# Patient Record
Sex: FEMALE | Race: WHITE | NOT HISPANIC OR LATINO
[De-identification: names, ages, dates, MRNs, and addresses within clinical notes are randomized per-mention and may not be internally consistent; named-entity substitution may affect disease eponyms.]

---

## 2017-03-06 ENCOUNTER — RESULT REVIEW (OUTPATIENT)
Age: 62
End: 2017-03-06

## 2017-05-06 PROBLEM — M25.569 KNEE PAIN: Status: ACTIVE | Noted: 2017-05-06

## 2017-05-10 ENCOUNTER — APPOINTMENT (OUTPATIENT)
Dept: ORTHOPEDIC SURGERY | Facility: CLINIC | Age: 62
End: 2017-05-10

## 2017-05-10 DIAGNOSIS — M25.569 PAIN IN UNSPECIFIED KNEE: ICD-10-CM

## 2018-01-23 ENCOUNTER — OUTPATIENT (OUTPATIENT)
Dept: OUTPATIENT SERVICES | Facility: HOSPITAL | Age: 63
LOS: 1 days | End: 2018-01-23
Payer: COMMERCIAL

## 2018-01-23 VITALS
DIASTOLIC BLOOD PRESSURE: 80 MMHG | HEIGHT: 66 IN | RESPIRATION RATE: 16 BRPM | WEIGHT: 209.66 LBS | TEMPERATURE: 98 F | HEART RATE: 84 BPM | SYSTOLIC BLOOD PRESSURE: 140 MMHG

## 2018-01-23 DIAGNOSIS — Z98.890 OTHER SPECIFIED POSTPROCEDURAL STATES: Chronic | ICD-10-CM

## 2018-01-23 DIAGNOSIS — N84.0 POLYP OF CORPUS UTERI: ICD-10-CM

## 2018-01-23 DIAGNOSIS — Z01.818 ENCOUNTER FOR OTHER PREPROCEDURAL EXAMINATION: ICD-10-CM

## 2018-01-23 DIAGNOSIS — Z90.49 ACQUIRED ABSENCE OF OTHER SPECIFIED PARTS OF DIGESTIVE TRACT: Chronic | ICD-10-CM

## 2018-01-23 LAB
ANION GAP SERPL CALC-SCNC: 13 MMOL/L — SIGNIFICANT CHANGE UP (ref 5–17)
BUN SERPL-MCNC: 13 MG/DL — SIGNIFICANT CHANGE UP (ref 8–20)
CALCIUM SERPL-MCNC: 9.3 MG/DL — SIGNIFICANT CHANGE UP (ref 8.6–10.2)
CHLORIDE SERPL-SCNC: 104 MMOL/L — SIGNIFICANT CHANGE UP (ref 98–107)
CO2 SERPL-SCNC: 27 MMOL/L — SIGNIFICANT CHANGE UP (ref 22–29)
CREAT SERPL-MCNC: 0.61 MG/DL — SIGNIFICANT CHANGE UP (ref 0.5–1.3)
GLUCOSE SERPL-MCNC: 102 MG/DL — SIGNIFICANT CHANGE UP (ref 70–115)
HCT VFR BLD CALC: 38 % — SIGNIFICANT CHANGE UP (ref 37–47)
HGB BLD-MCNC: 11.4 G/DL — LOW (ref 12–16)
MCHC RBC-ENTMCNC: 26.5 PG — LOW (ref 27–31)
MCHC RBC-ENTMCNC: 30 G/DL — LOW (ref 32–36)
MCV RBC AUTO: 88.2 FL — SIGNIFICANT CHANGE UP (ref 81–99)
PLATELET # BLD AUTO: 337 K/UL — SIGNIFICANT CHANGE UP (ref 150–400)
POTASSIUM SERPL-MCNC: 3.6 MMOL/L — SIGNIFICANT CHANGE UP (ref 3.5–5.3)
POTASSIUM SERPL-SCNC: 3.6 MMOL/L — SIGNIFICANT CHANGE UP (ref 3.5–5.3)
RBC # BLD: 4.31 M/UL — LOW (ref 4.4–5.2)
RBC # FLD: 15 % — SIGNIFICANT CHANGE UP (ref 11–15.6)
SODIUM SERPL-SCNC: 144 MMOL/L — SIGNIFICANT CHANGE UP (ref 135–145)
WBC # BLD: 10.1 K/UL — SIGNIFICANT CHANGE UP (ref 4.8–10.8)
WBC # FLD AUTO: 10.1 K/UL — SIGNIFICANT CHANGE UP (ref 4.8–10.8)

## 2018-01-23 PROCEDURE — 93005 ELECTROCARDIOGRAM TRACING: CPT

## 2018-01-23 PROCEDURE — 80048 BASIC METABOLIC PNL TOTAL CA: CPT

## 2018-01-23 PROCEDURE — G0463: CPT

## 2018-01-23 PROCEDURE — 85027 COMPLETE CBC AUTOMATED: CPT

## 2018-01-23 PROCEDURE — 93010 ELECTROCARDIOGRAM REPORT: CPT

## 2018-01-23 PROCEDURE — 36415 COLL VENOUS BLD VENIPUNCTURE: CPT

## 2018-01-23 RX ORDER — SODIUM CHLORIDE 9 MG/ML
3 INJECTION INTRAMUSCULAR; INTRAVENOUS; SUBCUTANEOUS ONCE
Qty: 0 | Refills: 0 | Status: DISCONTINUED | OUTPATIENT
Start: 2018-02-05 | End: 2018-02-20

## 2018-01-23 NOTE — H&P PST ADULT - NSANTHOSAYNRD_GEN_A_CORE
No. IVA screening performed.  STOP BANG Legend: 0-2 = LOW Risk; 3-4 = INTERMEDIATE Risk; 5-8 = HIGH Risk

## 2018-01-23 NOTE — H&P PST ADULT - PMH
Acute pain  left shoulder  Asthma    GERD (gastroesophageal reflux disease)    Hypertension    Overactive bladder Acute pain  left shoulder  Asthma    GERD (gastroesophageal reflux disease)    Hypertension    Overactive bladder    Uterine polyp

## 2018-01-23 NOTE — H&P PST ADULT - HISTORY OF PRESENT ILLNESS
63 year old female with a history of GERD, hypertension, asthma, and overactive bladder presents with a uterine polyp scheduled for a D&C on 2/5/18.

## 2018-02-05 ENCOUNTER — OUTPATIENT (OUTPATIENT)
Dept: OUTPATIENT SERVICES | Facility: HOSPITAL | Age: 63
LOS: 1 days | End: 2018-02-05
Payer: COMMERCIAL

## 2018-02-05 ENCOUNTER — RESULT REVIEW (OUTPATIENT)
Age: 63
End: 2018-02-05

## 2018-02-05 VITALS
OXYGEN SATURATION: 98 % | WEIGHT: 207.9 LBS | SYSTOLIC BLOOD PRESSURE: 136 MMHG | DIASTOLIC BLOOD PRESSURE: 74 MMHG | RESPIRATION RATE: 16 BRPM | HEART RATE: 89 BPM | TEMPERATURE: 99 F | HEIGHT: 66 IN

## 2018-02-05 VITALS
DIASTOLIC BLOOD PRESSURE: 80 MMHG | RESPIRATION RATE: 16 BRPM | HEART RATE: 78 BPM | OXYGEN SATURATION: 98 % | SYSTOLIC BLOOD PRESSURE: 143 MMHG

## 2018-02-05 DIAGNOSIS — Z98.890 OTHER SPECIFIED POSTPROCEDURAL STATES: Chronic | ICD-10-CM

## 2018-02-05 DIAGNOSIS — N84.0 POLYP OF CORPUS UTERI: ICD-10-CM

## 2018-02-05 DIAGNOSIS — Z90.49 ACQUIRED ABSENCE OF OTHER SPECIFIED PARTS OF DIGESTIVE TRACT: Chronic | ICD-10-CM

## 2018-02-05 PROCEDURE — 88305 TISSUE EXAM BY PATHOLOGIST: CPT | Mod: 26

## 2018-02-05 PROCEDURE — 58120 DILATION AND CURETTAGE: CPT

## 2018-02-05 PROCEDURE — 88305 TISSUE EXAM BY PATHOLOGIST: CPT

## 2018-02-05 RX ORDER — HYDROMORPHONE HYDROCHLORIDE 2 MG/ML
0.5 INJECTION INTRAMUSCULAR; INTRAVENOUS; SUBCUTANEOUS
Qty: 0 | Refills: 0 | Status: DISCONTINUED | OUTPATIENT
Start: 2018-02-05 | End: 2018-02-05

## 2018-02-05 RX ORDER — ONDANSETRON 8 MG/1
4 TABLET, FILM COATED ORAL ONCE
Qty: 0 | Refills: 0 | Status: DISCONTINUED | OUTPATIENT
Start: 2018-02-05 | End: 2018-02-05

## 2018-02-05 RX ORDER — SODIUM CHLORIDE 9 MG/ML
1000 INJECTION, SOLUTION INTRAVENOUS
Qty: 0 | Refills: 0 | Status: DISCONTINUED | OUTPATIENT
Start: 2018-02-05 | End: 2018-02-20

## 2018-02-05 RX ORDER — ONDANSETRON 8 MG/1
4 TABLET, FILM COATED ORAL EVERY 6 HOURS
Qty: 0 | Refills: 0 | Status: DISCONTINUED | OUTPATIENT
Start: 2018-02-05 | End: 2018-02-20

## 2018-02-05 NOTE — ASU DISCHARGE PLAN (ADULT/PEDIATRIC). - NOTIFY
Unable to Urinate/Pain not relieved by Medications/Fever greater than 101/Bleeding that does not stop/Persistent Nausea and Vomiting

## 2018-02-05 NOTE — ASU DISCHARGE PLAN (ADULT/PEDIATRIC). - MEDICATION SUMMARY - MEDICATIONS TO TAKE
I will START or STAY ON the medications listed below when I get home from the hospital:    Tylenol with Codeine #3 oral tablet  -- 1 tab(s) by mouth every 6 hours, As Needed  -- Indication: For as per MD    Cozaar 50 mg oral tablet  -- 1 tab(s) by mouth once a day  -- Indication: For as per MD    montelukast 10 mg oral tablet  -- 1 tab(s) by mouth once a day  -- Indication: For as per MD    Singulair 10 mg oral tablet  -- 1 tab(s) by mouth once a day  -- Indication: For as per MD    omeprazole 40 mg oral delayed release capsule  -- 1 cap(s) by mouth once a day  -- Indication: For as per tin    trospium 60 mg oral capsule, extended release  -- 1 cap(s) by mouth once a day (in the morning)  -- Indication: For as per MD

## 2018-02-05 NOTE — BRIEF OPERATIVE NOTE - POST-OP DX
Endocervical polyp  02/05/2018  3cm,  pale/white, extending from endocervix  Active  Payton Danielson

## 2018-02-05 NOTE — BRIEF OPERATIVE NOTE - PROCEDURE
<<-----Click on this checkbox to enter Procedure Dilation and curettage  02/05/2018    Active  [email or web address]  Endocervical curettage  02/05/2018    Active  [email or web address]  Endocervical polypectomy  02/05/2018    [id number]

## 2018-02-06 ENCOUNTER — TRANSCRIPTION ENCOUNTER (OUTPATIENT)
Age: 63
End: 2018-02-06

## 2018-02-13 ENCOUNTER — RESULT REVIEW (OUTPATIENT)
Age: 63
End: 2018-02-13

## 2018-02-20 LAB — SURGICAL PATHOLOGY FINAL REPORT - CH: SIGNIFICANT CHANGE UP

## 2018-02-28 ENCOUNTER — FORM ENCOUNTER (OUTPATIENT)
Age: 63
End: 2018-02-28

## 2018-03-05 ENCOUNTER — APPOINTMENT (OUTPATIENT)
Dept: ORTHOPEDIC SURGERY | Facility: CLINIC | Age: 63
End: 2018-03-05
Payer: COMMERCIAL

## 2018-03-05 VITALS
BODY MASS INDEX: 34.16 KG/M2 | HEIGHT: 65 IN | SYSTOLIC BLOOD PRESSURE: 139 MMHG | WEIGHT: 205 LBS | TEMPERATURE: 98.7 F | HEART RATE: 83 BPM | DIASTOLIC BLOOD PRESSURE: 80 MMHG

## 2018-03-05 DIAGNOSIS — M25.561 PAIN IN RIGHT KNEE: ICD-10-CM

## 2018-03-05 DIAGNOSIS — Z87.39 PERSONAL HISTORY OF OTHER DISEASES OF THE MUSCULOSKELETAL SYSTEM AND CONNECTIVE TISSUE: ICD-10-CM

## 2018-03-05 DIAGNOSIS — Z78.9 OTHER SPECIFIED HEALTH STATUS: ICD-10-CM

## 2018-03-05 DIAGNOSIS — Z82.61 FAMILY HISTORY OF ARTHRITIS: ICD-10-CM

## 2018-03-05 DIAGNOSIS — Z86.79 PERSONAL HISTORY OF OTHER DISEASES OF THE CIRCULATORY SYSTEM: ICD-10-CM

## 2018-03-05 DIAGNOSIS — Z80.0 FAMILY HISTORY OF MALIGNANT NEOPLASM OF DIGESTIVE ORGANS: ICD-10-CM

## 2018-03-05 DIAGNOSIS — Z87.09 PERSONAL HISTORY OF OTHER DISEASES OF THE RESPIRATORY SYSTEM: ICD-10-CM

## 2018-03-05 PROCEDURE — 73562 X-RAY EXAM OF KNEE 3: CPT | Mod: RT

## 2018-03-05 PROCEDURE — 99204 OFFICE O/P NEW MOD 45 MIN: CPT

## 2018-03-05 RX ORDER — TROSPIUM CHLORIDE 60 MG/1
60 CAPSULE, EXTENDED RELEASE ORAL
Refills: 0 | Status: ACTIVE | COMMUNITY

## 2018-03-05 RX ORDER — MONTELUKAST SODIUM 10 MG/1
10 TABLET, FILM COATED ORAL
Refills: 0 | Status: ACTIVE | COMMUNITY

## 2018-03-14 ENCOUNTER — APPOINTMENT (OUTPATIENT)
Dept: NUCLEAR MEDICINE | Facility: CLINIC | Age: 63
End: 2018-03-14
Payer: COMMERCIAL

## 2018-03-14 ENCOUNTER — OUTPATIENT (OUTPATIENT)
Dept: OUTPATIENT SERVICES | Facility: HOSPITAL | Age: 63
LOS: 1 days | End: 2018-03-14

## 2018-03-14 DIAGNOSIS — C54.1 MALIGNANT NEOPLASM OF ENDOMETRIUM: ICD-10-CM

## 2018-03-14 DIAGNOSIS — Z98.890 OTHER SPECIFIED POSTPROCEDURAL STATES: Chronic | ICD-10-CM

## 2018-03-14 DIAGNOSIS — Z90.49 ACQUIRED ABSENCE OF OTHER SPECIFIED PARTS OF DIGESTIVE TRACT: Chronic | ICD-10-CM

## 2018-03-14 PROCEDURE — 78815 PET IMAGE W/CT SKULL-THIGH: CPT | Mod: 26,PI

## 2018-03-15 ENCOUNTER — FORM ENCOUNTER (OUTPATIENT)
Age: 63
End: 2018-03-15

## 2018-03-16 ENCOUNTER — OUTPATIENT (OUTPATIENT)
Dept: OUTPATIENT SERVICES | Facility: HOSPITAL | Age: 63
LOS: 1 days | End: 2018-03-16
Payer: COMMERCIAL

## 2018-03-16 VITALS
HEART RATE: 94 BPM | TEMPERATURE: 98 F | DIASTOLIC BLOOD PRESSURE: 88 MMHG | WEIGHT: 209.44 LBS | RESPIRATION RATE: 16 BRPM | HEIGHT: 65.5 IN | SYSTOLIC BLOOD PRESSURE: 126 MMHG

## 2018-03-16 DIAGNOSIS — C54.1 MALIGNANT NEOPLASM OF ENDOMETRIUM: ICD-10-CM

## 2018-03-16 DIAGNOSIS — Z01.818 ENCOUNTER FOR OTHER PREPROCEDURAL EXAMINATION: ICD-10-CM

## 2018-03-16 DIAGNOSIS — Z90.49 ACQUIRED ABSENCE OF OTHER SPECIFIED PARTS OF DIGESTIVE TRACT: Chronic | ICD-10-CM

## 2018-03-16 DIAGNOSIS — Z98.890 OTHER SPECIFIED POSTPROCEDURAL STATES: Chronic | ICD-10-CM

## 2018-03-16 DIAGNOSIS — I10 ESSENTIAL (PRIMARY) HYPERTENSION: ICD-10-CM

## 2018-03-16 LAB
ANION GAP SERPL CALC-SCNC: 12 MMOL/L — SIGNIFICANT CHANGE UP (ref 5–17)
BASOPHILS # BLD AUTO: 0 K/UL — SIGNIFICANT CHANGE UP (ref 0–0.2)
BASOPHILS NFR BLD AUTO: 0.4 % — SIGNIFICANT CHANGE UP (ref 0–2)
BLD GP AB SCN SERPL QL: SIGNIFICANT CHANGE UP
BUN SERPL-MCNC: 14 MG/DL — SIGNIFICANT CHANGE UP (ref 8–20)
CALCIUM SERPL-MCNC: 9.3 MG/DL — SIGNIFICANT CHANGE UP (ref 8.6–10.2)
CHLORIDE SERPL-SCNC: 106 MMOL/L — SIGNIFICANT CHANGE UP (ref 98–107)
CO2 SERPL-SCNC: 26 MMOL/L — SIGNIFICANT CHANGE UP (ref 22–29)
CREAT SERPL-MCNC: 0.55 MG/DL — SIGNIFICANT CHANGE UP (ref 0.5–1.3)
EOSINOPHIL # BLD AUTO: 0.2 K/UL — SIGNIFICANT CHANGE UP (ref 0–0.5)
EOSINOPHIL NFR BLD AUTO: 2.2 % — SIGNIFICANT CHANGE UP (ref 0–6)
GLUCOSE SERPL-MCNC: 120 MG/DL — HIGH (ref 70–115)
HCT VFR BLD CALC: 37.7 % — SIGNIFICANT CHANGE UP (ref 37–47)
HGB BLD-MCNC: 11.4 G/DL — LOW (ref 12–16)
LYMPHOCYTES # BLD AUTO: 3.6 K/UL — SIGNIFICANT CHANGE UP (ref 1–4.8)
LYMPHOCYTES # BLD AUTO: 36.2 % — SIGNIFICANT CHANGE UP (ref 20–55)
MCHC RBC-ENTMCNC: 26.6 PG — LOW (ref 27–31)
MCHC RBC-ENTMCNC: 30.2 G/DL — LOW (ref 32–36)
MCV RBC AUTO: 87.9 FL — SIGNIFICANT CHANGE UP (ref 81–99)
MONOCYTES # BLD AUTO: 0.6 K/UL — SIGNIFICANT CHANGE UP (ref 0–0.8)
MONOCYTES NFR BLD AUTO: 5.8 % — SIGNIFICANT CHANGE UP (ref 3–10)
NEUTROPHILS # BLD AUTO: 5.6 K/UL — SIGNIFICANT CHANGE UP (ref 1.8–8)
NEUTROPHILS NFR BLD AUTO: 55.2 % — SIGNIFICANT CHANGE UP (ref 37–73)
PLATELET # BLD AUTO: 322 K/UL — SIGNIFICANT CHANGE UP (ref 150–400)
POTASSIUM SERPL-MCNC: 3.8 MMOL/L — SIGNIFICANT CHANGE UP (ref 3.5–5.3)
POTASSIUM SERPL-SCNC: 3.8 MMOL/L — SIGNIFICANT CHANGE UP (ref 3.5–5.3)
RBC # BLD: 4.29 M/UL — LOW (ref 4.4–5.2)
RBC # FLD: 15.4 % — SIGNIFICANT CHANGE UP (ref 11–15.6)
SODIUM SERPL-SCNC: 144 MMOL/L — SIGNIFICANT CHANGE UP (ref 135–145)
TYPE + AB SCN PNL BLD: SIGNIFICANT CHANGE UP
WBC # BLD: 10 K/UL — SIGNIFICANT CHANGE UP (ref 4.8–10.8)
WBC # FLD AUTO: 10 K/UL — SIGNIFICANT CHANGE UP (ref 4.8–10.8)

## 2018-03-16 PROCEDURE — 86850 RBC ANTIBODY SCREEN: CPT

## 2018-03-16 PROCEDURE — G0463: CPT

## 2018-03-16 PROCEDURE — 86901 BLOOD TYPING SEROLOGIC RH(D): CPT

## 2018-03-16 PROCEDURE — 36415 COLL VENOUS BLD VENIPUNCTURE: CPT

## 2018-03-16 PROCEDURE — 86900 BLOOD TYPING SEROLOGIC ABO: CPT

## 2018-03-16 PROCEDURE — 80048 BASIC METABOLIC PNL TOTAL CA: CPT

## 2018-03-16 PROCEDURE — 85027 COMPLETE CBC AUTOMATED: CPT

## 2018-03-16 RX ORDER — ACETAMINOPHEN WITH CODEINE 300MG-30MG
1 TABLET ORAL
Qty: 0 | Refills: 0 | COMMUNITY

## 2018-03-16 RX ORDER — LOSARTAN POTASSIUM 100 MG/1
1 TABLET, FILM COATED ORAL
Qty: 0 | Refills: 0 | COMMUNITY

## 2018-03-16 RX ORDER — MONTELUKAST 4 MG/1
1 TABLET, CHEWABLE ORAL
Qty: 0 | Refills: 0 | COMMUNITY

## 2018-03-16 NOTE — H&P PST ADULT - PROBLEM SELECTOR PLAN 1
Robotic assisted total laparoscopic hysterectomy, bilateral salpingo oophorectomy,  retroperitoneal lymph node sampling, intraoperative identification of sentinel node, cystoscopy.

## 2018-03-16 NOTE — H&P PST ADULT - PSH
S/P arthroscopy of shoulder    S/P cholecystectomy    S/P dilatation and curettage    S/P right knee arthroscopy    S/P tendon repair  R hand

## 2018-03-16 NOTE — H&P PST ADULT - HISTORY OF PRESENT ILLNESS
62 yo female with endometrial cancer, pt had thickened endometrium, Dilation and Curettage revealed endometrial cancer.

## 2018-03-16 NOTE — H&P PST ADULT - PMH
Acute pain  left shoulder  Asthma    GERD (gastroesophageal reflux disease)    Hypertension    Overactive bladder    Uterine polyp

## 2018-03-26 ENCOUNTER — FORM ENCOUNTER (OUTPATIENT)
Age: 63
End: 2018-03-26

## 2018-03-27 ENCOUNTER — RESULT REVIEW (OUTPATIENT)
Age: 63
End: 2018-03-27

## 2018-03-27 ENCOUNTER — INPATIENT (INPATIENT)
Facility: HOSPITAL | Age: 63
LOS: 0 days | Discharge: HOME | DRG: 741 | End: 2018-03-28
Attending: OBSTETRICS & GYNECOLOGY | Admitting: OBSTETRICS & GYNECOLOGY
Payer: COMMERCIAL

## 2018-03-27 VITALS
OXYGEN SATURATION: 96 % | HEIGHT: 66 IN | HEART RATE: 95 BPM | SYSTOLIC BLOOD PRESSURE: 130 MMHG | WEIGHT: 207.01 LBS | TEMPERATURE: 99 F | RESPIRATION RATE: 16 BRPM | DIASTOLIC BLOOD PRESSURE: 53 MMHG

## 2018-03-27 DIAGNOSIS — Z98.890 OTHER SPECIFIED POSTPROCEDURAL STATES: Chronic | ICD-10-CM

## 2018-03-27 DIAGNOSIS — Z90.49 ACQUIRED ABSENCE OF OTHER SPECIFIED PARTS OF DIGESTIVE TRACT: Chronic | ICD-10-CM

## 2018-03-27 DIAGNOSIS — C54.1 MALIGNANT NEOPLASM OF ENDOMETRIUM: ICD-10-CM

## 2018-03-27 LAB
ABO RH CONFIRMATION: SIGNIFICANT CHANGE UP
GLUCOSE BLDC GLUCOMTR-MCNC: 82 MG/DL — SIGNIFICANT CHANGE UP (ref 70–99)
GLUCOSE BLDC GLUCOMTR-MCNC: 86 MG/DL — SIGNIFICANT CHANGE UP (ref 70–99)

## 2018-03-27 PROCEDURE — 88342 IMHCHEM/IMCYTCHM 1ST ANTB: CPT | Mod: 26

## 2018-03-27 PROCEDURE — 88305 TISSUE EXAM BY PATHOLOGIST: CPT | Mod: 26

## 2018-03-27 PROCEDURE — 88309 TISSUE EXAM BY PATHOLOGIST: CPT | Mod: 26

## 2018-03-27 PROCEDURE — 88341 IMHCHEM/IMCYTCHM EA ADD ANTB: CPT | Mod: 26

## 2018-03-27 RX ORDER — MONTELUKAST 4 MG/1
10 TABLET, CHEWABLE ORAL DAILY
Qty: 0 | Refills: 0 | Status: DISCONTINUED | OUTPATIENT
Start: 2018-03-27 | End: 2018-03-28

## 2018-03-27 RX ORDER — ENOXAPARIN SODIUM 100 MG/ML
40 INJECTION SUBCUTANEOUS DAILY
Qty: 0 | Refills: 0 | Status: DISCONTINUED | OUTPATIENT
Start: 2018-03-28 | End: 2018-03-28

## 2018-03-27 RX ORDER — SODIUM CHLORIDE 9 MG/ML
3 INJECTION INTRAMUSCULAR; INTRAVENOUS; SUBCUTANEOUS ONCE
Qty: 0 | Refills: 0 | Status: DISCONTINUED | OUTPATIENT
Start: 2018-03-27 | End: 2018-03-27

## 2018-03-27 RX ORDER — FENTANYL CITRATE 50 UG/ML
50 INJECTION INTRAVENOUS
Qty: 0 | Refills: 0 | Status: DISCONTINUED | OUTPATIENT
Start: 2018-03-27 | End: 2018-03-27

## 2018-03-27 RX ORDER — ENOXAPARIN SODIUM 100 MG/ML
40 INJECTION SUBCUTANEOUS
Qty: 11.2 | Refills: 0
Start: 2018-03-27 | End: 2018-04-23

## 2018-03-27 RX ORDER — ONDANSETRON 8 MG/1
4 TABLET, FILM COATED ORAL ONCE
Qty: 0 | Refills: 0 | Status: DISCONTINUED | OUTPATIENT
Start: 2018-03-27 | End: 2018-03-27

## 2018-03-27 RX ORDER — PANTOPRAZOLE SODIUM 20 MG/1
40 TABLET, DELAYED RELEASE ORAL
Qty: 0 | Refills: 0 | Status: DISCONTINUED | OUTPATIENT
Start: 2018-03-27 | End: 2018-03-28

## 2018-03-27 RX ORDER — BUDESONIDE AND FORMOTEROL FUMARATE DIHYDRATE 160; 4.5 UG/1; UG/1
2 AEROSOL RESPIRATORY (INHALATION) DAILY
Qty: 0 | Refills: 0 | Status: DISCONTINUED | OUTPATIENT
Start: 2018-03-27 | End: 2018-03-28

## 2018-03-27 RX ORDER — OXYCODONE HYDROCHLORIDE 5 MG/1
10 TABLET ORAL
Qty: 0 | Refills: 0 | Status: DISCONTINUED | OUTPATIENT
Start: 2018-03-27 | End: 2018-03-28

## 2018-03-27 RX ORDER — HYDROMORPHONE HYDROCHLORIDE 2 MG/ML
0.5 INJECTION INTRAMUSCULAR; INTRAVENOUS; SUBCUTANEOUS
Qty: 0 | Refills: 0 | Status: DISCONTINUED | OUTPATIENT
Start: 2018-03-27 | End: 2018-03-27

## 2018-03-27 RX ORDER — CEFOTETAN DISODIUM 1 G
1 VIAL (EA) INJECTION ONCE
Qty: 0 | Refills: 0 | Status: COMPLETED | OUTPATIENT
Start: 2018-03-28 | End: 2018-03-28

## 2018-03-27 RX ORDER — CEFOTETAN DISODIUM 1 G
2 VIAL (EA) INJECTION ONCE
Qty: 0 | Refills: 0 | Status: COMPLETED | OUTPATIENT
Start: 2018-03-27 | End: 2018-03-27

## 2018-03-27 RX ORDER — DEXTROSE MONOHYDRATE, SODIUM CHLORIDE, AND POTASSIUM CHLORIDE 50; .745; 4.5 G/1000ML; G/1000ML; G/1000ML
1000 INJECTION, SOLUTION INTRAVENOUS
Qty: 0 | Refills: 0 | Status: DISCONTINUED | OUTPATIENT
Start: 2018-03-27 | End: 2018-03-28

## 2018-03-27 RX ORDER — ACETAMINOPHEN 500 MG
1000 TABLET ORAL ONCE
Qty: 0 | Refills: 0 | Status: COMPLETED | OUTPATIENT
Start: 2018-03-27 | End: 2018-03-27

## 2018-03-27 RX ORDER — SODIUM CHLORIDE 9 MG/ML
1000 INJECTION, SOLUTION INTRAVENOUS
Qty: 0 | Refills: 0 | Status: DISCONTINUED | OUTPATIENT
Start: 2018-03-27 | End: 2018-03-27

## 2018-03-27 RX ORDER — OXYCODONE AND ACETAMINOPHEN 5; 325 MG/1; MG/1
2 TABLET ORAL EVERY 4 HOURS
Qty: 0 | Refills: 0 | Status: DISCONTINUED | OUTPATIENT
Start: 2018-03-27 | End: 2018-03-28

## 2018-03-27 RX ORDER — LOSARTAN POTASSIUM 100 MG/1
100 TABLET, FILM COATED ORAL DAILY
Qty: 0 | Refills: 0 | Status: DISCONTINUED | OUTPATIENT
Start: 2018-03-27 | End: 2018-03-28

## 2018-03-27 RX ORDER — ONDANSETRON 8 MG/1
4 TABLET, FILM COATED ORAL EVERY 6 HOURS
Qty: 0 | Refills: 0 | Status: DISCONTINUED | OUTPATIENT
Start: 2018-03-27 | End: 2018-03-28

## 2018-03-27 RX ORDER — OXYCODONE AND ACETAMINOPHEN 5; 325 MG/1; MG/1
1 TABLET ORAL EVERY 4 HOURS
Qty: 0 | Refills: 0 | Status: DISCONTINUED | OUTPATIENT
Start: 2018-03-27 | End: 2018-03-28

## 2018-03-27 RX ORDER — ENOXAPARIN SODIUM 100 MG/ML
40 INJECTION SUBCUTANEOUS ONCE
Qty: 0 | Refills: 0 | Status: COMPLETED | OUTPATIENT
Start: 2018-03-27 | End: 2018-03-27

## 2018-03-27 RX ADMIN — Medication 1000 MILLIGRAM(S): at 23:12

## 2018-03-27 RX ADMIN — SODIUM CHLORIDE 100 MILLILITER(S): 9 INJECTION, SOLUTION INTRAVENOUS at 18:07

## 2018-03-27 RX ADMIN — FENTANYL CITRATE 50 MICROGRAM(S): 50 INJECTION INTRAVENOUS at 18:11

## 2018-03-27 RX ADMIN — OXYCODONE HYDROCHLORIDE 10 MILLIGRAM(S): 5 TABLET ORAL at 20:10

## 2018-03-27 RX ADMIN — OXYCODONE HYDROCHLORIDE 10 MILLIGRAM(S): 5 TABLET ORAL at 20:30

## 2018-03-27 RX ADMIN — FENTANYL CITRATE 50 MICROGRAM(S): 50 INJECTION INTRAVENOUS at 18:24

## 2018-03-27 RX ADMIN — Medication 400 MILLIGRAM(S): at 22:10

## 2018-03-27 RX ADMIN — ENOXAPARIN SODIUM 40 MILLIGRAM(S): 100 INJECTION SUBCUTANEOUS at 23:24

## 2018-03-27 RX ADMIN — OXYCODONE HYDROCHLORIDE 10 MILLIGRAM(S): 5 TABLET ORAL at 23:39

## 2018-03-27 RX ADMIN — FENTANYL CITRATE 50 MICROGRAM(S): 50 INJECTION INTRAVENOUS at 19:00

## 2018-03-27 RX ADMIN — Medication 100 GRAM(S): at 16:00

## 2018-03-27 NOTE — PROGRESS NOTE ADULT - SUBJECTIVE AND OBJECTIVE BOX
POST OPERATIVE DAY #: 0    SUBJECTIVE: Pt seen and examined in PACU states she feels well with minimal surgical site pain. She states her abdomen is sore, however pain is tolerable. She denies CP, SOB, N, V.     Vital Signs Last 24 Hrs  T(C): 36.7 (27 Mar 2018 21:00), Max: 37 (27 Mar 2018 12:55)  T(F): 98 (27 Mar 2018 21:00), Max: 98.6 (27 Mar 2018 12:55)  HR: 92 (27 Mar 2018 21:00) (70 - 96)  BP: 138/91 (27 Mar 2018 21:00) (120/77 - 140/89)  BP(mean): --  RR: 18 (27 Mar 2018 21:00) (16 - 18)  SpO2: 100% (27 Mar 2018 21:00) (96% - 100%)  I&O's Summary    27 Mar 2018 07:01  -  27 Mar 2018 21:40  --------------------------------------------------------  IN: 400 mL / OUT: 200 mL / NET: 200 mL      I&O's Detail    27 Mar 2018 07:01  -  27 Mar 2018 21:40  --------------------------------------------------------  IN:    lactated ringers.: 400 mL  Total IN: 400 mL    OUT:    Indwelling Catheter - Urethral: 200 mL  Total OUT: 200 mL    Total NET: 200 mL            RADIOLOGY & ADDITIONAL STUDIES:    PHYSICAL EXAM:      Constitutional: NAD, alert and oriented     Neck: supple, no JVD    Respiratory: CTABL    Cardiovascular: s1s2, RRR    Gastrointestinal: soft, NT, ND, +BS, no guarding, no rebound sites dressed C/D/I     Genitourinary: taylor    Rectal: deferred     Extremities: warm, compartment soft, no calf pain, SCD in place       A/P: 63y  s/p RA TLH BSO PPALND cysto clinically stable post op  - Diet: ADAT  - Activity: as tolerated   - Labs: pending   - Pain medication  - DVT ppx

## 2018-03-28 ENCOUNTER — TRANSCRIPTION ENCOUNTER (OUTPATIENT)
Age: 63
End: 2018-03-28

## 2018-03-28 VITALS
OXYGEN SATURATION: 97 % | DIASTOLIC BLOOD PRESSURE: 67 MMHG | RESPIRATION RATE: 17 BRPM | SYSTOLIC BLOOD PRESSURE: 108 MMHG | TEMPERATURE: 99 F | HEART RATE: 96 BPM

## 2018-03-28 DIAGNOSIS — C54.1 MALIGNANT NEOPLASM OF ENDOMETRIUM: ICD-10-CM

## 2018-03-28 LAB
ANION GAP SERPL CALC-SCNC: 11 MMOL/L — SIGNIFICANT CHANGE UP (ref 5–17)
BASOPHILS # BLD AUTO: 0 K/UL — SIGNIFICANT CHANGE UP (ref 0–0.2)
BASOPHILS NFR BLD AUTO: 0.1 % — SIGNIFICANT CHANGE UP (ref 0–2)
BUN SERPL-MCNC: 14 MG/DL — SIGNIFICANT CHANGE UP (ref 8–20)
CALCIUM SERPL-MCNC: 9 MG/DL — SIGNIFICANT CHANGE UP (ref 8.6–10.2)
CHLORIDE SERPL-SCNC: 99 MMOL/L — SIGNIFICANT CHANGE UP (ref 98–107)
CO2 SERPL-SCNC: 26 MMOL/L — SIGNIFICANT CHANGE UP (ref 22–29)
CREAT SERPL-MCNC: 0.68 MG/DL — SIGNIFICANT CHANGE UP (ref 0.5–1.3)
EOSINOPHIL # BLD AUTO: 0 K/UL — SIGNIFICANT CHANGE UP (ref 0–0.5)
EOSINOPHIL NFR BLD AUTO: 0 % — SIGNIFICANT CHANGE UP (ref 0–6)
GLUCOSE SERPL-MCNC: 155 MG/DL — HIGH (ref 70–115)
HCT VFR BLD CALC: 38.3 % — SIGNIFICANT CHANGE UP (ref 37–47)
HGB BLD-MCNC: 11.6 G/DL — LOW (ref 12–16)
LYMPHOCYTES # BLD AUTO: 1.8 K/UL — SIGNIFICANT CHANGE UP (ref 1–4.8)
LYMPHOCYTES # BLD AUTO: 14.7 % — LOW (ref 20–55)
MAGNESIUM SERPL-MCNC: 2.2 MG/DL — SIGNIFICANT CHANGE UP (ref 1.6–2.6)
MCHC RBC-ENTMCNC: 26.6 PG — LOW (ref 27–31)
MCHC RBC-ENTMCNC: 30.3 G/DL — LOW (ref 32–36)
MCV RBC AUTO: 87.8 FL — SIGNIFICANT CHANGE UP (ref 81–99)
MONOCYTES # BLD AUTO: 0.2 K/UL — SIGNIFICANT CHANGE UP (ref 0–0.8)
MONOCYTES NFR BLD AUTO: 1.6 % — LOW (ref 3–10)
NEUTROPHILS # BLD AUTO: 10.1 K/UL — HIGH (ref 1.8–8)
NEUTROPHILS NFR BLD AUTO: 83.3 % — HIGH (ref 37–73)
PHOSPHATE SERPL-MCNC: 2.8 MG/DL — SIGNIFICANT CHANGE UP (ref 2.4–4.7)
PLATELET # BLD AUTO: 352 K/UL — SIGNIFICANT CHANGE UP (ref 150–400)
POTASSIUM SERPL-MCNC: 4.9 MMOL/L — SIGNIFICANT CHANGE UP (ref 3.5–5.3)
POTASSIUM SERPL-SCNC: 4.9 MMOL/L — SIGNIFICANT CHANGE UP (ref 3.5–5.3)
RBC # BLD: 4.36 M/UL — LOW (ref 4.4–5.2)
RBC # FLD: 15.2 % — SIGNIFICANT CHANGE UP (ref 11–15.6)
SODIUM SERPL-SCNC: 136 MMOL/L — SIGNIFICANT CHANGE UP (ref 135–145)
WBC # BLD: 12.1 K/UL — HIGH (ref 4.8–10.8)
WBC # FLD AUTO: 12.1 K/UL — HIGH (ref 4.8–10.8)

## 2018-03-28 PROCEDURE — 84100 ASSAY OF PHOSPHORUS: CPT

## 2018-03-28 PROCEDURE — 36415 COLL VENOUS BLD VENIPUNCTURE: CPT

## 2018-03-28 PROCEDURE — 80048 BASIC METABOLIC PNL TOTAL CA: CPT

## 2018-03-28 PROCEDURE — 88309 TISSUE EXAM BY PATHOLOGIST: CPT

## 2018-03-28 PROCEDURE — 88342 IMHCHEM/IMCYTCHM 1ST ANTB: CPT

## 2018-03-28 PROCEDURE — 83735 ASSAY OF MAGNESIUM: CPT

## 2018-03-28 PROCEDURE — 82962 GLUCOSE BLOOD TEST: CPT

## 2018-03-28 PROCEDURE — S2900: CPT

## 2018-03-28 PROCEDURE — 85027 COMPLETE CBC AUTOMATED: CPT

## 2018-03-28 PROCEDURE — 94640 AIRWAY INHALATION TREATMENT: CPT

## 2018-03-28 PROCEDURE — 88305 TISSUE EXAM BY PATHOLOGIST: CPT

## 2018-03-28 PROCEDURE — 88341 IMHCHEM/IMCYTCHM EA ADD ANTB: CPT

## 2018-03-28 RX ORDER — ACETAMINOPHEN 500 MG
650 TABLET ORAL EVERY 6 HOURS
Qty: 0 | Refills: 0 | Status: DISCONTINUED | OUTPATIENT
Start: 2018-03-28 | End: 2018-03-28

## 2018-03-28 RX ORDER — OXYCODONE HYDROCHLORIDE 5 MG/1
1 TABLET ORAL
Qty: 20 | Refills: 0
Start: 2018-03-28 | End: 2018-04-01

## 2018-03-28 RX ADMIN — OXYCODONE HYDROCHLORIDE 10 MILLIGRAM(S): 5 TABLET ORAL at 05:52

## 2018-03-28 RX ADMIN — OXYCODONE HYDROCHLORIDE 10 MILLIGRAM(S): 5 TABLET ORAL at 09:01

## 2018-03-28 RX ADMIN — BUDESONIDE AND FORMOTEROL FUMARATE DIHYDRATE 2 PUFF(S): 160; 4.5 AEROSOL RESPIRATORY (INHALATION) at 10:15

## 2018-03-28 RX ADMIN — OXYCODONE HYDROCHLORIDE 10 MILLIGRAM(S): 5 TABLET ORAL at 10:00

## 2018-03-28 RX ADMIN — MONTELUKAST 10 MILLIGRAM(S): 4 TABLET, CHEWABLE ORAL at 13:00

## 2018-03-28 RX ADMIN — Medication 650 MILLIGRAM(S): at 04:47

## 2018-03-28 RX ADMIN — Medication 100 GRAM(S): at 01:55

## 2018-03-28 RX ADMIN — Medication 650 MILLIGRAM(S): at 05:52

## 2018-03-28 RX ADMIN — PANTOPRAZOLE SODIUM 40 MILLIGRAM(S): 20 TABLET, DELAYED RELEASE ORAL at 04:42

## 2018-03-28 RX ADMIN — OXYCODONE HYDROCHLORIDE 10 MILLIGRAM(S): 5 TABLET ORAL at 04:42

## 2018-03-28 RX ADMIN — LOSARTAN POTASSIUM 100 MILLIGRAM(S): 100 TABLET, FILM COATED ORAL at 04:42

## 2018-03-28 RX ADMIN — Medication 650 MILLIGRAM(S): at 13:00

## 2018-03-28 RX ADMIN — OXYCODONE HYDROCHLORIDE 10 MILLIGRAM(S): 5 TABLET ORAL at 00:10

## 2018-03-28 NOTE — DISCHARGE NOTE ADULT - INSTRUCTIONS
none  May walk and climb stairs as often as youd like, no vigorous activity, do not lift anything greater than 10lbs, nothing per vagina x 6 weeks, do not drive while on pain medication.

## 2018-03-28 NOTE — DISCHARGE NOTE ADULT - HOSPITAL COURSE
70 yo s/p RA TL BSO PPALND, cystoscopy. Patient post-operatively had an uncomplicated hospital course. Her pain was well controlled. She is tolerating a regular diet. She is ambulating independently. She was able to void after removal of taylor. Labs and Vitals WNL upon discharge.

## 2018-03-28 NOTE — DISCHARGE NOTE ADULT - PATIENT PORTAL LINK FT
You can access the [facility] Patient Portal, offered by [facility], by registering with the following website: [email or web address]

## 2018-03-28 NOTE — DISCHARGE NOTE ADULT - CARE PLAN
Principal Discharge DX:	Endometrial ca  Goal:	post-op recovery  Assessment and plan of treatment:	Will discuss further plan in office.

## 2018-03-28 NOTE — PROGRESS NOTE ADULT - SUBJECTIVE AND OBJECTIVE BOX
GYNECOLOGIC ONCOLOGY PROGRESS NOTE    POD#1    PROBLEMS: Endometrial Cancer, Overactive bladder, GERD, Asthma, HTN.    Pt seen and examined at bedside.     SUBJECTIVE:    Patient is without complaints.  Pain well-controlled.  Denies Nausea, Vomiting or Diarrhea.  Denies shortness of breath, chest pain or dyspnea on exertion.  Tolerating diet.  Patient has not yet ambulated as she was transferred to the floor late at night.    OBJECTIVE:     VITALS:  T(F): 98.5 (03-28-18 @ 04:25), Max: 98.6 (03-27-18 @ 12:55)  HR: 100 (03-28-18 @ 04:25) (70 - 105)  BP: 132/72 (03-28-18 @ 04:25) (120/77 - 140/89)  RR: 18 (03-28-18 @ 04:25) (16 - 18)  SpO2: 98% (03-28-18 @ 04:25) (93% - 100%)  Wt(kg): --    I&O's Summary    Delarosa-1700 cc's output overnight.      MEDICATIONS  (STANDING):  buDESOnide 160 MICROgram(s)/formoterol 4.5 MICROgram(s) Inhaler 2 Puff(s) Inhalation daily  dextrose 5% + sodium chloride 0.45% with potassium chloride 20 mEq/L 1000 milliLiter(s) (125 mL/Hr) IV Continuous <Continuous>  enoxaparin Injectable 40 milliGRAM(s) SubCutaneous daily  losartan 100 milliGRAM(s) Oral daily  montelukast 10 milliGRAM(s) Oral daily  pantoprazole    Tablet 40 milliGRAM(s) Oral before breakfast    MEDICATIONS  (PRN):  acetaminophen   Tablet. 650 milliGRAM(s) Oral every 6 hours PRN headache  ondansetron Injectable 4 milliGRAM(s) IV Push every 6 hours PRN Postoperative Nausea and/or Vomiting  oxyCODONE    5 mG/acetaminophen 325 mG 1 Tablet(s) Oral every 4 hours PRN Moderate Pain  oxyCODONE    5 mG/acetaminophen 325 mG 2 Tablet(s) Oral every 4 hours PRN Severe Pain  oxyCODONE    IR 10 milliGRAM(s) Oral every 3 hours PRN Moderate Pain (4 - 6)      Physical Exam:  Constitutional: NAD  Pulmonary: Clear to auscultation bilaterally   Cardiovascular: Regular rate and rhythm   Abdomen: soft, non-tender, non-distended, normal bowel sounds  Extremities: No lower extremity edema or calve tenderness, Tasneem's sign negative.  Incision: Clean, dry, intact.  Without signs of infection or hernia. Op-sites removed at bedside.      LABS:                        11.6   12.1  )-----------( 352      ( 28 Mar 2018 07:11 )             38.3

## 2018-03-28 NOTE — DISCHARGE NOTE ADULT - MEDICATION SUMMARY - MEDICATIONS TO STOP TAKING
I will STOP taking the medications listed below when I get home from the hospital:    Excedrin Extra Strength oral tablet  -- 2 tab(s) by mouth every 6 hours, As Needed

## 2018-03-28 NOTE — DISCHARGE NOTE ADULT - MEDICATION SUMMARY - MEDICATIONS TO TAKE
I will START or STAY ON the medications listed below when I get home from the hospital:    Cozaar 100 mg oral tablet  -- 1 tab(s) by mouth once a day  -- Indication: For per pmd    Lovenox 40 mg/0.4 mL injectable solution  -- 40 milligram(s) subcutaneously once a day  x 28 days  -- It is very important that you take or use this exactly as directed.  Do not skip doses or discontinue unless directed by your doctor.    -- Indication: For dvt prophylaxis    Advair Diskus 250 mcg-50 mcg inhalation powder  -- 1 puff(s) inhaled once a day  -- Indication: For per pmd    montelukast 10 mg oral tablet  -- 1 tab(s) by mouth once a day  -- Indication: For per pmd    omeprazole 40 mg oral delayed release capsule  -- 1 cap(s) by mouth once a day  -- Indication: For per pmd    trospium 60 mg oral capsule, extended release  -- 1 cap(s) by mouth once a day (in the morning)  -- Indication: For per pmd I will START or STAY ON the medications listed below when I get home from the hospital:    acetaminophen-oxycodone 325 mg-5 mg oral tablet  -- 1 tab(s) by mouth every 6 hours, As Needed -for severe pain MDD:4 tablets  -- Caution federal law prohibits the transfer of this drug to any person other  than the person for whom it was prescribed.  May cause drowsiness.  Alcohol may intensify this effect.  Use care when operating dangerous machinery.  This prescription cannot be refilled.  This product contains acetaminophen.  Do not use  with any other product containing acetaminophen to prevent possible liver damage.  Using more of this medication than prescribed may cause serious breathing problems.    -- Indication: For pain    naproxen 500 mg oral tablet  -- 1 tab(s) by mouth 2 times a day   -- Check with your doctor before becoming pregnant.  May cause drowsiness or dizziness.  Obtain medical advice before taking any non-prescription drugs as some may affect the action of this medication.  Take with food or milk.    -- Indication: For inflammation/pain    Cozaar 100 mg oral tablet  -- 1 tab(s) by mouth once a day  -- Indication: For per pmd    Lovenox 40 mg/0.4 mL injectable solution  -- 40 milligram(s) subcutaneously once a day  x 28 days  -- It is very important that you take or use this exactly as directed.  Do not skip doses or discontinue unless directed by your doctor.    -- Indication: For dvt prophylaxis    Advair Diskus 250 mcg-50 mcg inhalation powder  -- 1 puff(s) inhaled once a day  -- Indication: For per pmd    montelukast 10 mg oral tablet  -- 1 tab(s) by mouth once a day  -- Indication: For per pmd    omeprazole 40 mg oral delayed release capsule  -- 1 cap(s) by mouth once a day  -- Indication: For per pmd    trospium 60 mg oral capsule, extended release  -- 1 cap(s) by mouth once a day (in the morning)  -- Indication: For per pmd

## 2018-03-28 NOTE — PROGRESS NOTE ADULT - SUBJECTIVE AND OBJECTIVE BOX
pt pod 1 sp hysterectomy BSO and cysto under GETA. Tolerated well. Denies any A/c.   pt with no n/v @ this time. using pain meds as ordered/needed with some pain relief. vss. spont vent. no issues with anesthesia at this time. pt resting comfortably @ this time.

## 2018-03-28 NOTE — DISCHARGE NOTE ADULT - CARE PROVIDER_API CALL
Aroldo Danielson), Gynecologic Oncology; Obstetrics and Gynecology  [address] [place] [address]  Phone: [phone]  Fax: [phone]

## 2018-03-28 NOTE — PROGRESS NOTE ADULT - PROBLEM SELECTOR PLAN 1
Follow-up repeat VS  Dc to home pending void, ambulation and normal labs.  Lovenox script sent for dvt prophylaxis  Naproxen and Percocet scripts sent for pain control  Dc instructions given at bedside  All questions answered.

## 2018-04-01 ENCOUNTER — FORM ENCOUNTER (OUTPATIENT)
Age: 63
End: 2018-04-01

## 2018-04-04 ENCOUNTER — FORM ENCOUNTER (OUTPATIENT)
Age: 63
End: 2018-04-04

## 2018-04-04 LAB — SURGICAL PATHOLOGY FINAL REPORT - CH: SIGNIFICANT CHANGE UP

## 2018-04-10 ENCOUNTER — FORM ENCOUNTER (OUTPATIENT)
Age: 63
End: 2018-04-10

## 2018-06-06 ENCOUNTER — FORM ENCOUNTER (OUTPATIENT)
Age: 63
End: 2018-06-06

## 2018-07-24 ENCOUNTER — RECORD ABSTRACTING (OUTPATIENT)
Age: 63
End: 2018-07-24

## 2018-07-24 DIAGNOSIS — Z87.39 PERSONAL HISTORY OF OTHER DISEASES OF THE MUSCULOSKELETAL SYSTEM AND CONNECTIVE TISSUE: ICD-10-CM

## 2018-07-24 DIAGNOSIS — J45.909 UNSPECIFIED ASTHMA, UNCOMPLICATED: ICD-10-CM

## 2018-07-24 DIAGNOSIS — Z87.898 PERSONAL HISTORY OF OTHER SPECIFIED CONDITIONS: ICD-10-CM

## 2018-07-24 DIAGNOSIS — Z83.3 FAMILY HISTORY OF DIABETES MELLITUS: ICD-10-CM

## 2018-07-24 DIAGNOSIS — Z92.89 PERSONAL HISTORY OF OTHER MEDICAL TREATMENT: ICD-10-CM

## 2018-08-07 ENCOUNTER — FORM ENCOUNTER (OUTPATIENT)
Age: 63
End: 2018-08-07

## 2018-08-08 ENCOUNTER — APPOINTMENT (OUTPATIENT)
Dept: GYNECOLOGIC ONCOLOGY | Facility: CLINIC | Age: 63
End: 2018-08-08
Payer: COMMERCIAL

## 2018-08-08 VITALS
OXYGEN SATURATION: 96 % | SYSTOLIC BLOOD PRESSURE: 137 MMHG | HEIGHT: 66 IN | RESPIRATION RATE: 16 BRPM | HEART RATE: 100 BPM | DIASTOLIC BLOOD PRESSURE: 90 MMHG | WEIGHT: 210 LBS | BODY MASS INDEX: 33.75 KG/M2

## 2018-08-08 PROBLEM — N84.0 POLYP OF CORPUS UTERI: Chronic | Status: ACTIVE | Noted: 2018-01-23

## 2018-08-08 PROBLEM — N32.81 OVERACTIVE BLADDER: Chronic | Status: ACTIVE | Noted: 2018-01-23

## 2018-08-08 PROBLEM — I10 ESSENTIAL (PRIMARY) HYPERTENSION: Chronic | Status: ACTIVE | Noted: 2018-01-23

## 2018-08-08 PROBLEM — K21.9 GASTRO-ESOPHAGEAL REFLUX DISEASE WITHOUT ESOPHAGITIS: Chronic | Status: ACTIVE | Noted: 2018-01-23

## 2018-08-08 PROBLEM — R52 PAIN, UNSPECIFIED: Chronic | Status: ACTIVE | Noted: 2018-01-23

## 2018-08-08 PROBLEM — J45.909 UNSPECIFIED ASTHMA, UNCOMPLICATED: Chronic | Status: ACTIVE | Noted: 2018-01-23

## 2018-08-08 PROCEDURE — 99214 OFFICE O/P EST MOD 30 MIN: CPT

## 2018-08-14 RX ORDER — FLUTICASONE PROPIONATE AND SALMETEROL 50; 250 UG/1; UG/1
250-50 POWDER RESPIRATORY (INHALATION)
Refills: 0 | Status: ACTIVE | COMMUNITY

## 2018-11-30 ENCOUNTER — APPOINTMENT (OUTPATIENT)
Dept: GYNECOLOGIC ONCOLOGY | Facility: CLINIC | Age: 63
End: 2018-11-30
Payer: COMMERCIAL

## 2018-11-30 VITALS
OXYGEN SATURATION: 97 % | BODY MASS INDEX: 31.66 KG/M2 | HEART RATE: 82 BPM | DIASTOLIC BLOOD PRESSURE: 83 MMHG | RESPIRATION RATE: 16 BRPM | SYSTOLIC BLOOD PRESSURE: 141 MMHG | WEIGHT: 197 LBS | HEIGHT: 66 IN

## 2018-11-30 PROCEDURE — 99214 OFFICE O/P EST MOD 30 MIN: CPT

## 2018-11-30 RX ORDER — FLUTICASONE PROPIONATE AND SALMETEROL XINAFOATE 230; 21 UG/1; UG/1
AEROSOL, METERED RESPIRATORY (INHALATION)
Refills: 0 | Status: DISCONTINUED | COMMUNITY
End: 2018-11-30

## 2019-02-26 ENCOUNTER — RESULT REVIEW (OUTPATIENT)
Age: 64
End: 2019-02-26

## 2019-03-08 ENCOUNTER — APPOINTMENT (OUTPATIENT)
Dept: GYNECOLOGIC ONCOLOGY | Facility: CLINIC | Age: 64
End: 2019-03-08
Payer: COMMERCIAL

## 2019-03-08 DIAGNOSIS — Z85.42 PERSONAL HISTORY OF MALIGNANT NEOPLASM OF OTHER PARTS OF UTERUS: ICD-10-CM

## 2019-03-08 PROCEDURE — 99214 OFFICE O/P EST MOD 30 MIN: CPT

## 2019-03-27 ENCOUNTER — APPOINTMENT (OUTPATIENT)
Dept: ORTHOPEDIC SURGERY | Facility: CLINIC | Age: 64
End: 2019-03-27
Payer: COMMERCIAL

## 2019-03-27 VITALS
SYSTOLIC BLOOD PRESSURE: 121 MMHG | WEIGHT: 197 LBS | BODY MASS INDEX: 31.66 KG/M2 | DIASTOLIC BLOOD PRESSURE: 81 MMHG | HEART RATE: 90 BPM | HEIGHT: 66 IN

## 2019-03-27 PROCEDURE — 73562 X-RAY EXAM OF KNEE 3: CPT

## 2019-03-27 PROCEDURE — 99214 OFFICE O/P EST MOD 30 MIN: CPT

## 2019-03-27 NOTE — PHYSICAL EXAM
[LE] : Sensory: Intact in bilateral lower extremities [ALL] : dorsalis pedis, posterior tibial, femoral, popliteal, and radial 2+ and symmetric bilaterally [Obese] : obese [Poor Appearance] : well-appearing [Acute Distress] : not in acute distress [de-identified] : GENERAL APPEARANCE: Well nourished and hydrated, pleasant, alert, and oriented x 3. Appears their stated age. \par HEENT: Normocephalic, extraocular eye motion intact. Nasal septum midline. Oral cavity clear. External auditory canal clear. \par RESPIRATORY: Breath sounds clear and audible in all lobes. No wheezing, No accessory muscle use.\par CARDIOVASCULAR: No apparent abnormalities. No lower leg edema. No varicosities. Pedal pulses are palpable.\par NEUROLOGIC: Sensation is normal, no muscle weakness in the upper or lower extremities.\par DERMATOLOGIC: No apparent skin lesions, moist, warm, no rash.\par SPINE: Cervical spine appears normal and moves freely; thoracic spine appears normal and moves freely; lumbosacral spine appears normal and moves freely, normal, nontender.\par MUSCULOSKELETAL: Hands, wrists, and elbows are normal and move freely, shoulders are normal and move freely.  [de-identified] : Right knee Examination shows medial joint line tenderness, preserved range of motion, mild joint effusion. Varus deformity.\par  [de-identified] : 3V Xray of the right knee done in office today and reviewed by Dr. Karlos De La Vega demonstrates end stage medial compartment osteoarthritis of the right knee

## 2019-03-27 NOTE — DISCUSSION/SUMMARY
[Medication Risks Reviewed] : Medication risks reviewed [Surgical risks reviewed] : Surgical risks reviewed [de-identified] : 64 year old female presents with end stage medial compartment osteoarthritis of the right knee. We discussed the nature of the condition and the treatment options. She has responded poorly to recent conservative treatment with cortisone injections and hyaluronic acid injections. Based on today's xrays and her persistent symptoms, she is a candidate for right total knee replacement, and I believe this would be her best option for long term relief. We discussed the surgery in detail including post-operative recovery. She is interested in scheduling her right TKR procedure for August 2019. She will schedule the procedure today. \par \par A knee replacement means resurfacing of all 3 surfaces of the patella, the femur, and tibia with metal and plastic parts. The prosthetic parts are usually cemented into position and well outpatient range of motion from full extension to about 120° of flexion. The postoperative motion, however, is determined by multiple factors, the most important of which is preoperative motion. In general, the better motion preoperatively, the better the motion postoperatively. The operation, pending medical clearance, gently requires hospitalization of 3-4 days for one knee, 5-7 days for bilateral knee replacements. In general, we prefer to perform the procedure under spinal anesthesia with femoral nerve block and occasional single shot sciatic nerve block. We may ask a patient to give 2 units of blood for bilateral total knee arthroplasties, for one knee we institute a normogram which may include administration of preoperative Procrit. The operative procedure takes probably 1-2 hours. The operation requires a straight incision anywhere from 5-7 inches down from the knee. Postoperatively, the patient is positioned in a continuous passive motion machine within the first 24 hours and is walking the day after surgery. The first couple days are very painful and the pain medication will alleviate, but not eliminate the pain. The patient must really push hard to get range of motion. Our goal for having a person go home as that the range of motion is approximately 0-90° of flexion and that they can walk with a walker or cane. A walking aid is to be dispensed once the patient is secure enough. In general there, there is no cast or brace required with routine knee replacement. In the long term, we do not encourage our patients to run for the sake of running, although pending their preoperative status, we often allow patient to play doubles tennis or comparative activities. We also allowed them to do gentle intermediate downhill skiing if they are truly an expert skier. Biking is encouraged as well swimming. The followup periods are usually 3 weeks, 6 weeks, 3 months, and yearly intervals. Potential complications with total knee replacement included anesthetic complications and death, infection around 1%, nerve damage, by which means peroneal nerve palsy, footdrop or flapping foot with ambulation. This is particularly more apt to occur in the patient with a valgus or knock-knee deformity. The incidence can be quite high in this particular patient population. There will be areas of skin numbness, but this is not an untoward effect nor do we consider it a complication. Other potential complications include dislocation of the patella component, usually less than 2%; loosening of the tibial or femoral component is much more infrequent. Most often this occurs with infection or long-term use. Patient of extreme risk including markedly overweight patient's may be more prone to prosthetic wear. Major blood vessel damage is also extremely rare. Directly because of the anatomic proximity of the popliteal artery this could be lacerated with subsequent repair required. Be it unlikely, disruption of the popliteal artery could theoretically result in amputation. Similarly, infection could theoretically result in amputation if one were to grow out of an organism that cannot be controlled with antibiotics. General medical complications include phlebitis, for which we would prophylactically anticoagulate patients, but could still occur, and fatal pulmonary embolus which has been reported. Cardiovascular problems, such as heart attack or ischemia are always a concern with such hemodynamic changes in the blood vascular system. Other General complications are rare, but anything medicine could theoretically happen. I think the patient understands the risk benefit ratio of total knee replacement and will think about whether there like to pursue with an operation or nonoperative treatment program.

## 2019-03-27 NOTE — ADDENDUM
[FreeTextEntry1] : I, Waqar Faustin, acted solely as a scribe for Dr. Karlos De La Vega on this date 03/27/2019.

## 2019-03-27 NOTE — HISTORY OF PRESENT ILLNESS
[___ yrs] : [unfilled] year(s) ago [Walking] : walking [Exercise Regimen] : relieved by exercise regimen [Heat] : relieved by heat [NSAIDs] : relieved by nonsteroidal anti-inflammatory drugs [Recumbency] : relieved by recumbency [Rest] : relieved by rest [de-identified] : 64 year old female presents for follow-up evaluation of right knee pain, lasting for about 1.5 years. She has a known diagnosis of severe medial compartment osteoarthritis of the right knee. Her pain has persisted since her last visit and she experiences continued right knee pain. She notes clicking in her right knee when she walks. She is s/p right knee arthroscopy by Dr. De La Cruz 12/2017. She received hyaluronic acid injections in December 2018 with Dr. De La Cruz with little relief that lasted about 2 weeks. Prior to her last round of hyaluronic acid injections she has had a good response. 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  [de-identified] : hyaluronic acid injections

## 2019-05-07 ENCOUNTER — APPOINTMENT (OUTPATIENT)
Dept: ORTHOPEDIC SURGERY | Facility: CLINIC | Age: 64
End: 2019-05-07
Payer: COMMERCIAL

## 2019-05-07 DIAGNOSIS — M17.11 UNILATERAL PRIMARY OSTEOARTHRITIS, RIGHT KNEE: ICD-10-CM

## 2019-05-07 PROCEDURE — 20610 DRAIN/INJ JOINT/BURSA W/O US: CPT | Mod: RT

## 2019-05-07 PROCEDURE — 99213 OFFICE O/P EST LOW 20 MIN: CPT | Mod: 25

## 2019-05-07 NOTE — DISCUSSION/SUMMARY
[de-identified] : Medication risks reviewed. Surgical risks reviewed. 64 year old female presents with end stage medial compartment osteoarthritis of the right knee. We discussed the nature of the condition and the treatment options. She has responded poorly to recent conservative treatment with cortisone injections and hyaluronic acid injections. Based on today's xrays and her persistent symptoms, she is a candidate for right total knee replacement, and I believe this would be her best option for long term relief. We discussed the surgery in detail including post-operative recovery. pt received right knee cortisone injection for pain relief. She is pre op for  right TKR end of July. all question answered to her satisfaction. \par \par A knee replacement means resurfacing of all 3 surfaces of the patella, the femur, and tibia with metal and plastic parts. The prosthetic parts are usually cemented into position and well outpatient range of motion from full extension to about 120° of flexion. The postoperative motion, however, is determined by multiple factors, the most important of which is preoperative motion. In general, the better motion preoperatively, the better the motion postoperatively. The operation, pending medical clearance, gently requires hospitalization of 3-4 days for one knee, 5-7 days for bilateral knee replacements. In general, we prefer to perform the procedure under spinal anesthesia with femoral nerve block and occasional single shot sciatic nerve block. We may ask a patient to give 2 units of blood for bilateral total knee arthroplasties, for one knee we institute a normogram which may include administration of preoperative Procrit. The operative procedure takes probably 1-2 hours. The operation requires a straight incision anywhere from 5-7 inches down from the knee. Postoperatively, the patient is positioned in a continuous passive motion machine within the first 24 hours and is walking the day after surgery. The first couple days are very painful and the pain medication will alleviate, but not eliminate the pain. The patient must really push hard to get range of motion. Our goal for having a person go home as that the range of motion is approximately 0-90° of flexion and that they can walk with a walker or cane. A walking aid is to be dispensed once the patient is secure enough. In general there, there is no cast or brace required with routine knee replacement. In the long term, we do not encourage our patients to run for the sake of running, although pending their preoperative status, we often allow patient to play doubles tennis or comparative activities. We also allowed them to do gentle intermediate downhill skiing if they are truly an expert skier. Biking is encouraged as well swimming. The followup periods are usually 3 weeks, 6 weeks, 3 months, and yearly intervals. Potential complications with total knee replacement included anesthetic complications and death, infection around 1%, nerve damage, by which means peroneal nerve palsy, footdrop or flapping foot with ambulation. This is particularly more apt to occur in the patient with a valgus or knock-knee deformity. The incidence can be quite high in this particular patient population. There will be areas of skin numbness, but this is not an untoward effect nor do we consider it a complication. Other potential complications include dislocation of the patella component, usually less than 2%; loosening of the tibial or femoral component is much more infrequent. Most often this occurs with infection or long-term use. Patient of extreme risk including markedly overweight patient's may be more prone to prosthetic wear. Major blood vessel damage is also extremely rare. Directly because of the anatomic proximity of the popliteal artery this could be lacerated with subsequent repair required. Be it unlikely, disruption of the popliteal artery could theoretically result in amputation. Similarly, infection could theoretically result in amputation if one were to grow out of an organism that cannot be controlled with antibiotics. General medical complications include phlebitis, for which we would prophylactically anticoagulate patients, but could still occur, and fatal pulmonary embolus which has been reported. Cardiovascular problems, such as heart attack or ischemia are always a concern with such hemodynamic changes in the blood vascular system. Other General complications are rare, but anything medicine could theoretically happen. I think the patient understands the risk benefit ratio of total knee replacement and will think about whether there like to pursue with an operation or nonoperative treatment program. \par  \par

## 2019-05-07 NOTE — PROCEDURE
[de-identified] : pt Received a right knee cortisone injection.\par \par I discussed at length with the patient the planned steroid and lidocaine injection for primary osteoarthritis. The risks, benefits, convalescence and alternatives were reviewed and pt consented for injection. The possible side effects discussed included but were not limited to: pain, swelling, heat, bleeding, and redness. Symptoms are generally mild but if they are extensive then contact the office. Giving pain relievers by mouth such as NSAIDs or Tylenol can generally treat the reactions to steroid and lidocaine. Rare cases of infection have been noted. Rash, hives and itching may occur post injection. If you have muscle pain or cramps, flushing and or swelling of the face, rapid heart beat, nausea, dizziness, fever, chills, headache, difficulty breathing, swelling in the arms or legs, or have a prickly feeling of your skin, contact a health care provider immediately. Following this discussion, the knee was prepped with Alcohol and under sterile condition the 80 mg Depo-Medrol and 6 cc Lidocaine injection was performed with a 20 gauge needle through a superolateral injection site. The needle was introduced into the joint, aspiration was performed to ensure intra-articular placement and the medication was injected. Upon withdrawal of the needle the site was cleaned with alcohol and a band aid applied. The patient tolerated the injection well and there were no adverse effects. Post injection instructions included no strenuous activity for 24 hours, cryotherapy and if there are any adverse effects to contact the office.\par

## 2019-05-07 NOTE — PHYSICAL EXAM
[de-identified] : GENERAL APPEARANCE: Well nourished and hydrated, pleasant, alert, and oriented x 3. Appears their stated age. \par HEENT: Normocephalic, extraocular eye motion intact. Nasal septum midline. Oral cavity clear. External auditory canal clear. \par RESPIRATORY: Breath sounds clear and audible in all lobes. No wheezing, No accessory muscle use.\par CARDIOVASCULAR: No apparent abnormalities. No lower leg edema. No varicosities. Pedal pulses are palpable.\par NEUROLOGIC: Sensation is normal, no muscle weakness in the upper or lower extremities.\par DERMATOLOGIC: No apparent skin lesions, moist, warm, no rash.\par SPINE: Cervical spine appears normal and moves freely; thoracic spine appears normal and moves freely; lumbosacral spine appears normal and moves freely, normal, nontender.\par MUSCULOSKELETAL: Hands, wrists, and elbows are normal and move freely, shoulders are normal and move freely. \par Musculoskeletal:. Right knee Examination shows medial joint line tenderness, preserved range of motion, mild joint effusion. Varus deformity.\par  \par 5/5 motor strength in bilateral lower extremities. Sensory: Intact in bilateral lower extremities. DTRs: Biceps, brachioradialis, triceps, patellar, ankle and plantar 2+ and symmetric bilaterally. Pulses: dorsalis pedis, posterior tibial, femoral, popliteal, and radial 2+ and symmetric bilaterally. \par Constitutional: obese, but well-appearing and not in acute distress. \par

## 2019-05-07 NOTE — HISTORY OF PRESENT ILLNESS
[Pain Location] : pain [de-identified] : 64 year old female presents for follow-up evaluation of right knee pain, lasting for about 1.5 years. She has a known diagnosis of severe medial compartment osteoarthritis of the right knee. Her pain has persisted since her last visit and she experiences continued right knee pain. She notes clicking in her right knee when she walks. She is s/p right knee arthroscopy by Dr. De La Cruz 12/2017. She received hyaluronic acid injections in December 2018 with Dr. De La Cruz with little relief that lasted about 2 weeks. Prior to her last round of hyaluronic acid injections she has had a good response. 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 \par \par The patient mentions the symptoms started 1.5 year(s) ago. \par Her symptoms occur while walking. \par Relieving factors include relieved by exercise regimen, relieved by heat, relieved by nonsteroidal anti-inflammatory drugs, relieved by recumbency and relieved by rest . hyaluronic acid injections. \par \par  [Stable] : stable [8] : a current pain level of 8/10 [4] : a minimum pain level of 4/10 [9] : a maximum pain level of 9/10

## 2019-05-12 NOTE — H&P PST ADULT - VASCULAR
- generalized weakness, patient stated that she feels weak after climbing 6 steps at home and has not slept well for 2 weeks due to back pain  - possibly related to underlying CHF exacerbation vs poor PO intake (patient lives alone)  - PT eval  - nutrition eval  - fall precaution  - cardio consult - [place] heart Equal and normal pulses (carotid, femoral, dorsalis pedis)

## 2019-06-07 ENCOUNTER — APPOINTMENT (OUTPATIENT)
Dept: GYNECOLOGIC ONCOLOGY | Facility: CLINIC | Age: 64
End: 2019-06-07

## 2019-06-16 NOTE — H&P PST ADULT - PROBLEM/PLAN-1
Airway patent, TM normal bilaterally, normal appearing mouth, nose, throat, neck supple with full range of motion, no cervical adenopathy. DISPLAY PLAN FREE TEXT

## 2019-06-24 ENCOUNTER — APPOINTMENT (OUTPATIENT)
Dept: GYNECOLOGIC ONCOLOGY | Facility: CLINIC | Age: 64
End: 2019-06-24
Payer: COMMERCIAL

## 2019-06-24 VITALS
SYSTOLIC BLOOD PRESSURE: 114 MMHG | OXYGEN SATURATION: 99 % | HEIGHT: 66 IN | RESPIRATION RATE: 16 BRPM | BODY MASS INDEX: 31.66 KG/M2 | DIASTOLIC BLOOD PRESSURE: 76 MMHG | WEIGHT: 197 LBS | HEART RATE: 102 BPM

## 2019-06-24 PROCEDURE — 99214 OFFICE O/P EST MOD 30 MIN: CPT

## 2019-06-24 RX ORDER — LOSARTAN POTASSIUM 100 MG/1
100 TABLET, FILM COATED ORAL
Refills: 0 | Status: DISCONTINUED | COMMUNITY
End: 2019-06-24

## 2019-06-24 RX ORDER — LOSARTAN POTASSIUM AND HYDROCHLOROTHIAZIDE 100; 25 MG/1; MG/1
100-25 TABLET, FILM COATED ORAL
Refills: 0 | Status: ACTIVE | COMMUNITY

## 2019-06-24 RX ORDER — LOSARTAN POTASSIUM 100 MG/1
TABLET, FILM COATED ORAL
Refills: 0 | Status: DISCONTINUED | COMMUNITY
End: 2019-06-24

## 2019-06-24 NOTE — HISTORY OF PRESENT ILLNESS
[FreeTextEntry1] : This 63y/o with a history of stage 1A grade 1 endometrial cancer since 03/2018 returns to the office for a three month surveillance visit. Patient feels well from a gynecological stand point. She denies pain or VB. She reports being up to date with her mammogram and bone density. She follows with her primary gynecologist, Dr. Payton Danielson. Pt  is scheduled to have a knee replacement. She reports having a colonoscopy last year prior to her hysterectomy and gets frequent colonoscopies due to family history of colon cancer and personal history of polyps.  \par  \par 
Initial (On Arrival)

## 2019-06-24 NOTE — REASON FOR VISIT
[FreeTextEntry1] : [facility]\par \par [facility] [phone] at [address] [place] [phone]\par

## 2019-06-24 NOTE — PHYSICAL EXAM
[Absent] : Uterus: Absent [Normal] : Bimanual Exam: Normal [de-identified] : no palpable masses  [de-identified] : Daisy Hyman was present as chaperone during examination.

## 2019-07-01 ENCOUNTER — OUTPATIENT (OUTPATIENT)
Dept: OUTPATIENT SERVICES | Facility: HOSPITAL | Age: 64
LOS: 1 days | End: 2019-07-01
Payer: COMMERCIAL

## 2019-07-01 VITALS
HEART RATE: 87 BPM | HEIGHT: 64 IN | DIASTOLIC BLOOD PRESSURE: 77 MMHG | TEMPERATURE: 99 F | SYSTOLIC BLOOD PRESSURE: 114 MMHG | WEIGHT: 196.21 LBS | RESPIRATION RATE: 16 BRPM

## 2019-07-01 DIAGNOSIS — Z98.890 OTHER SPECIFIED POSTPROCEDURAL STATES: Chronic | ICD-10-CM

## 2019-07-01 DIAGNOSIS — Z90.710 ACQUIRED ABSENCE OF BOTH CERVIX AND UTERUS: Chronic | ICD-10-CM

## 2019-07-01 DIAGNOSIS — I10 ESSENTIAL (PRIMARY) HYPERTENSION: ICD-10-CM

## 2019-07-01 DIAGNOSIS — M17.11 UNILATERAL PRIMARY OSTEOARTHRITIS, RIGHT KNEE: ICD-10-CM

## 2019-07-01 DIAGNOSIS — Z01.818 ENCOUNTER FOR OTHER PREPROCEDURAL EXAMINATION: ICD-10-CM

## 2019-07-01 DIAGNOSIS — Z13.89 ENCOUNTER FOR SCREENING FOR OTHER DISORDER: ICD-10-CM

## 2019-07-01 DIAGNOSIS — Z29.9 ENCOUNTER FOR PROPHYLACTIC MEASURES, UNSPECIFIED: ICD-10-CM

## 2019-07-01 DIAGNOSIS — Z90.49 ACQUIRED ABSENCE OF OTHER SPECIFIED PARTS OF DIGESTIVE TRACT: Chronic | ICD-10-CM

## 2019-07-01 LAB
ANION GAP SERPL CALC-SCNC: 11 MMOL/L — SIGNIFICANT CHANGE UP (ref 5–17)
APTT BLD: 27 SEC — LOW (ref 27.5–36.3)
BLD GP AB SCN SERPL QL: SIGNIFICANT CHANGE UP
BUN SERPL-MCNC: 21 MG/DL — HIGH (ref 8–20)
CALCIUM SERPL-MCNC: 9.5 MG/DL — SIGNIFICANT CHANGE UP (ref 8.6–10.2)
CHLORIDE SERPL-SCNC: 99 MMOL/L — SIGNIFICANT CHANGE UP (ref 98–107)
CO2 SERPL-SCNC: 27 MMOL/L — SIGNIFICANT CHANGE UP (ref 22–29)
CREAT SERPL-MCNC: 0.68 MG/DL — SIGNIFICANT CHANGE UP (ref 0.5–1.3)
GLUCOSE SERPL-MCNC: 86 MG/DL — SIGNIFICANT CHANGE UP (ref 70–115)
HBA1C BLD-MCNC: 5.5 % — SIGNIFICANT CHANGE UP (ref 4–5.6)
HCT VFR BLD CALC: 38.8 % — SIGNIFICANT CHANGE UP (ref 34.5–45)
HGB BLD-MCNC: 12.3 G/DL — SIGNIFICANT CHANGE UP (ref 11.5–15.5)
INR BLD: 0.97 RATIO — SIGNIFICANT CHANGE UP (ref 0.88–1.16)
MCHC RBC-ENTMCNC: 29.4 PG — SIGNIFICANT CHANGE UP (ref 27–34)
MCHC RBC-ENTMCNC: 31.7 GM/DL — LOW (ref 32–36)
MCV RBC AUTO: 92.6 FL — SIGNIFICANT CHANGE UP (ref 80–100)
MRSA PCR RESULT.: SIGNIFICANT CHANGE UP
PLATELET # BLD AUTO: 308 K/UL — SIGNIFICANT CHANGE UP (ref 150–400)
POTASSIUM SERPL-MCNC: 3.9 MMOL/L — SIGNIFICANT CHANGE UP (ref 3.5–5.3)
POTASSIUM SERPL-SCNC: 3.9 MMOL/L — SIGNIFICANT CHANGE UP (ref 3.5–5.3)
PROTHROM AB SERPL-ACNC: 11.1 SEC — SIGNIFICANT CHANGE UP (ref 10–12.9)
RBC # BLD: 4.19 M/UL — SIGNIFICANT CHANGE UP (ref 3.8–5.2)
RBC # FLD: 13.2 % — SIGNIFICANT CHANGE UP (ref 10.3–14.5)
S AUREUS DNA NOSE QL NAA+PROBE: SIGNIFICANT CHANGE UP
SODIUM SERPL-SCNC: 137 MMOL/L — SIGNIFICANT CHANGE UP (ref 135–145)
TYPE + AB SCN PNL BLD: SIGNIFICANT CHANGE UP
WBC # BLD: 8.82 K/UL — SIGNIFICANT CHANGE UP (ref 3.8–10.5)
WBC # FLD AUTO: 8.82 K/UL — SIGNIFICANT CHANGE UP (ref 3.8–10.5)

## 2019-07-01 PROCEDURE — 80048 BASIC METABOLIC PNL TOTAL CA: CPT

## 2019-07-01 PROCEDURE — 93005 ELECTROCARDIOGRAM TRACING: CPT

## 2019-07-01 PROCEDURE — 85730 THROMBOPLASTIN TIME PARTIAL: CPT

## 2019-07-01 PROCEDURE — 85027 COMPLETE CBC AUTOMATED: CPT

## 2019-07-01 PROCEDURE — G0463: CPT

## 2019-07-01 PROCEDURE — 36415 COLL VENOUS BLD VENIPUNCTURE: CPT

## 2019-07-01 PROCEDURE — 86850 RBC ANTIBODY SCREEN: CPT

## 2019-07-01 PROCEDURE — 85610 PROTHROMBIN TIME: CPT

## 2019-07-01 PROCEDURE — 93010 ELECTROCARDIOGRAM REPORT: CPT

## 2019-07-01 PROCEDURE — 86900 BLOOD TYPING SEROLOGIC ABO: CPT

## 2019-07-01 PROCEDURE — 86901 BLOOD TYPING SEROLOGIC RH(D): CPT

## 2019-07-01 PROCEDURE — 87640 STAPH A DNA AMP PROBE: CPT

## 2019-07-01 PROCEDURE — 83036 HEMOGLOBIN GLYCOSYLATED A1C: CPT

## 2019-07-01 PROCEDURE — 87641 MR-STAPH DNA AMP PROBE: CPT

## 2019-07-01 RX ORDER — TROSPIUM CHLORIDE 20 MG/1
1 TABLET, FILM COATED ORAL
Qty: 0 | Refills: 0 | DISCHARGE

## 2019-07-01 RX ORDER — MONTELUKAST 4 MG/1
1 TABLET, CHEWABLE ORAL
Qty: 0 | Refills: 0 | DISCHARGE

## 2019-07-01 RX ORDER — FLUTICASONE PROPIONATE AND SALMETEROL 50; 250 UG/1; UG/1
1 POWDER ORAL; RESPIRATORY (INHALATION)
Qty: 0 | Refills: 0 | DISCHARGE

## 2019-07-01 RX ORDER — LOSARTAN POTASSIUM 100 MG/1
1 TABLET, FILM COATED ORAL
Qty: 0 | Refills: 0 | DISCHARGE

## 2019-07-01 RX ORDER — SODIUM CHLORIDE 9 MG/ML
3 INJECTION INTRAMUSCULAR; INTRAVENOUS; SUBCUTANEOUS EVERY 8 HOURS
Refills: 0 | Status: DISCONTINUED | OUTPATIENT
Start: 2019-07-30 | End: 2019-08-01

## 2019-07-01 RX ORDER — OMEPRAZOLE 10 MG/1
1 CAPSULE, DELAYED RELEASE ORAL
Qty: 0 | Refills: 0 | DISCHARGE

## 2019-07-01 NOTE — H&P PST ADULT - NSICDXPASTSURGICALHX_GEN_ALL_CORE_FT
PAST SURGICAL HISTORY:  S/P arthroscopy of shoulder     S/P cholecystectomy     S/P dilatation and curettage     S/P right knee arthroscopy     S/P tendon repair R hand PAST SURGICAL HISTORY:  S/P arthroscopy of shoulder     S/P cholecystectomy     S/P dilatation and curettage     S/P right knee arthroscopy     S/P tendon repair R hand    S/P total hysterectomy

## 2019-07-01 NOTE — PATIENT PROFILE ADULT - NSPROEDALEARNPREF_GEN_A_NUR
written material/verbal instruction/presurgical, surgical scrub instructions, pain management education given - verbalized understanding

## 2019-07-01 NOTE — H&P PST ADULT - NSICDXPROBLEM_GEN_ALL_CORE_FT
PROBLEM DIAGNOSES  Problem: Unilateral primary osteoarthritis, right knee  Assessment and Plan: Total Right Knee Replacement  Medical Clearance    Problem: Need for prophylactic measure  Assessment and Plan:     Problem: Screening for substance abuse  Assessment and Plan: PROBLEM DIAGNOSES  Problem: Unilateral primary osteoarthritis, right knee  Assessment and Plan: Total Right Knee Replacement  Medical Clearance    Problem: Hypertension  Assessment and Plan: Follows with PMD    Problem: Need for prophylactic measure  Assessment and Plan: High risk.  Surgical Team to evaluate need for Pharmacologic VTE Prophylaxis    Problem: Screening for substance abuse  Assessment and Plan: Opioid screening tool score =1.  Low risk for potential future abuse

## 2019-07-01 NOTE — H&P PST ADULT - HISTORY OF PRESENT ILLNESS
This is a 64 y.o female who presents to PST today. This is a 64 y.o female who presents to PST today.  The pt reports a several year duration of right knee pain.  She has undergone PT, Cortisone injections and gel injections with minimal relief.  She is scheduled for a right knee replacement in the near future.

## 2019-07-01 NOTE — H&P PST ADULT - NSICDXPASTMEDICALHX_GEN_ALL_CORE_FT
PAST MEDICAL HISTORY:  Acute pain left shoulder    Asthma     GERD (gastroesophageal reflux disease)     Hypertension     Overactive bladder     Uterine polyp PAST MEDICAL HISTORY:  Acute pain left shoulder    Asthma     Endometrial cancer     GERD (gastroesophageal reflux disease)     Hypertension     IBS (irritable bowel syndrome)     Overactive bladder     Risk factors for obstructive sleep apnea     Uterine polyp

## 2019-07-01 NOTE — H&P PST ADULT - RS GEN PE MLT RESP DETAILS PC
normal/respirations non-labored/airway patent/diminished breath sounds, L/diminished breath sounds, R

## 2019-07-01 NOTE — H&P PST ADULT - ASSESSMENT

## 2019-07-01 NOTE — H&P PST ADULT - NSICDXFAMILYHX_GEN_ALL_CORE_FT
FAMILY HISTORY:  No pertinent family history in first degree relatives FAMILY HISTORY:  FH: colon cancer    Father  Still living? No  FH: diabetes mellitus, Age at diagnosis: Age Unknown  FH: hypertension, Age at diagnosis: Age Unknown  FH: myocardial infarction, Age at diagnosis: Age Unknown

## 2019-07-03 NOTE — ASU PATIENT PROFILE, ADULT - NSCAFFEAMTFREQ_GEN_ALL_CORE_SD
Vitals and assessment done. FHT spot check 146. Pt states light VB, pink in color. Says \"same as this morning\". States no lof or ctxs.  +FM 1-2 cups/cans per day

## 2019-07-29 ENCOUNTER — TRANSCRIPTION ENCOUNTER (OUTPATIENT)
Age: 64
End: 2019-07-29

## 2019-07-29 RX ORDER — TRANEXAMIC ACID 100 MG/ML
1000 INJECTION, SOLUTION INTRAVENOUS ONCE
Refills: 0 | Status: DISCONTINUED | OUTPATIENT
Start: 2019-07-30 | End: 2019-08-01

## 2019-07-29 RX ORDER — CEFAZOLIN SODIUM 1 G
2000 VIAL (EA) INJECTION ONCE
Refills: 0 | Status: DISCONTINUED | OUTPATIENT
Start: 2019-07-30 | End: 2019-08-01

## 2019-07-30 ENCOUNTER — INPATIENT (INPATIENT)
Facility: HOSPITAL | Age: 64
LOS: 1 days | Discharge: HOME | DRG: 470 | End: 2019-08-01
Attending: ORTHOPAEDIC SURGERY | Admitting: ORTHOPAEDIC SURGERY
Payer: COMMERCIAL

## 2019-07-30 ENCOUNTER — APPOINTMENT (OUTPATIENT)
Dept: ORTHOPEDIC SURGERY | Facility: HOSPITAL | Age: 64
End: 2019-07-30

## 2019-07-30 ENCOUNTER — TRANSCRIPTION ENCOUNTER (OUTPATIENT)
Age: 64
End: 2019-07-30

## 2019-07-30 VITALS
WEIGHT: 196.21 LBS | DIASTOLIC BLOOD PRESSURE: 50 MMHG | TEMPERATURE: 100 F | OXYGEN SATURATION: 97 % | RESPIRATION RATE: 16 BRPM | HEART RATE: 92 BPM | SYSTOLIC BLOOD PRESSURE: 112 MMHG | HEIGHT: 64 IN

## 2019-07-30 DIAGNOSIS — K21.9 GASTRO-ESOPHAGEAL REFLUX DISEASE WITHOUT ESOPHAGITIS: ICD-10-CM

## 2019-07-30 DIAGNOSIS — Z98.890 OTHER SPECIFIED POSTPROCEDURAL STATES: Chronic | ICD-10-CM

## 2019-07-30 DIAGNOSIS — Z90.49 ACQUIRED ABSENCE OF OTHER SPECIFIED PARTS OF DIGESTIVE TRACT: Chronic | ICD-10-CM

## 2019-07-30 DIAGNOSIS — Z96.651 PRESENCE OF RIGHT ARTIFICIAL KNEE JOINT: ICD-10-CM

## 2019-07-30 DIAGNOSIS — M17.11 UNILATERAL PRIMARY OSTEOARTHRITIS, RIGHT KNEE: ICD-10-CM

## 2019-07-30 DIAGNOSIS — K58.9 IRRITABLE BOWEL SYNDROME WITHOUT DIARRHEA: ICD-10-CM

## 2019-07-30 DIAGNOSIS — N32.81 OVERACTIVE BLADDER: ICD-10-CM

## 2019-07-30 DIAGNOSIS — Z90.710 ACQUIRED ABSENCE OF BOTH CERVIX AND UTERUS: Chronic | ICD-10-CM

## 2019-07-30 DIAGNOSIS — I10 ESSENTIAL (PRIMARY) HYPERTENSION: ICD-10-CM

## 2019-07-30 DIAGNOSIS — Z87.09 PERSONAL HISTORY OF OTHER DISEASES OF THE RESPIRATORY SYSTEM: ICD-10-CM

## 2019-07-30 DIAGNOSIS — Z85.42 PERSONAL HISTORY OF MALIGNANT NEOPLASM OF OTHER PARTS OF UTERUS: ICD-10-CM

## 2019-07-30 LAB
BLD GP AB SCN SERPL QL: SIGNIFICANT CHANGE UP
GLUCOSE BLDC GLUCOMTR-MCNC: 104 MG/DL — HIGH (ref 70–99)
GLUCOSE BLDC GLUCOMTR-MCNC: 105 MG/DL — HIGH (ref 70–99)
GLUCOSE BLDC GLUCOMTR-MCNC: 96 MG/DL — SIGNIFICANT CHANGE UP (ref 70–99)

## 2019-07-30 PROCEDURE — 73560 X-RAY EXAM OF KNEE 1 OR 2: CPT | Mod: 26,RT

## 2019-07-30 PROCEDURE — 27447 TOTAL KNEE ARTHROPLASTY: CPT | Mod: RT

## 2019-07-30 PROCEDURE — 20985 CPTR-ASST DIR MS PX: CPT

## 2019-07-30 PROCEDURE — 99222 1ST HOSP IP/OBS MODERATE 55: CPT

## 2019-07-30 PROCEDURE — 27447 TOTAL KNEE ARTHROPLASTY: CPT | Mod: AS,RT

## 2019-07-30 RX ORDER — PANTOPRAZOLE SODIUM 20 MG/1
40 TABLET, DELAYED RELEASE ORAL
Refills: 0 | Status: DISCONTINUED | OUTPATIENT
Start: 2019-07-30 | End: 2019-08-01

## 2019-07-30 RX ORDER — CEFAZOLIN SODIUM 1 G
2000 VIAL (EA) INJECTION
Refills: 0 | Status: COMPLETED | OUTPATIENT
Start: 2019-07-30 | End: 2019-07-31

## 2019-07-30 RX ORDER — HYDROCHLOROTHIAZIDE 25 MG
25 TABLET ORAL DAILY
Refills: 0 | Status: DISCONTINUED | OUTPATIENT
Start: 2019-08-01 | End: 2019-08-01

## 2019-07-30 RX ORDER — ASPIRIN/CALCIUM CARB/MAGNESIUM 324 MG
325 TABLET ORAL
Refills: 0 | Status: DISCONTINUED | OUTPATIENT
Start: 2019-07-31 | End: 2019-08-01

## 2019-07-30 RX ORDER — LOSARTAN POTASSIUM 100 MG/1
100 TABLET, FILM COATED ORAL DAILY
Refills: 0 | Status: DISCONTINUED | OUTPATIENT
Start: 2019-08-01 | End: 2019-08-01

## 2019-07-30 RX ORDER — ALBUTEROL 90 UG/1
1 AEROSOL, METERED ORAL EVERY 4 HOURS
Refills: 0 | Status: DISCONTINUED | OUTPATIENT
Start: 2019-07-30 | End: 2019-07-30

## 2019-07-30 RX ORDER — GABAPENTIN 400 MG/1
600 CAPSULE ORAL ONCE
Refills: 0 | Status: COMPLETED | OUTPATIENT
Start: 2019-07-30 | End: 2019-07-30

## 2019-07-30 RX ORDER — ACETAMINOPHEN 500 MG
975 TABLET ORAL EVERY 8 HOURS
Refills: 0 | Status: DISCONTINUED | OUTPATIENT
Start: 2019-07-30 | End: 2019-08-01

## 2019-07-30 RX ORDER — DOCUSATE SODIUM 100 MG
100 CAPSULE ORAL THREE TIMES A DAY
Refills: 0 | Status: DISCONTINUED | OUTPATIENT
Start: 2019-07-30 | End: 2019-08-01

## 2019-07-30 RX ORDER — SODIUM CHLORIDE 9 MG/ML
1000 INJECTION, SOLUTION INTRAVENOUS
Refills: 0 | Status: DISCONTINUED | OUTPATIENT
Start: 2019-07-30 | End: 2019-07-30

## 2019-07-30 RX ORDER — OXYCODONE HYDROCHLORIDE 5 MG/1
5 TABLET ORAL EVERY 4 HOURS
Refills: 0 | Status: DISCONTINUED | OUTPATIENT
Start: 2019-07-30 | End: 2019-07-31

## 2019-07-30 RX ORDER — OXYCODONE HYDROCHLORIDE 5 MG/1
10 TABLET ORAL EVERY 4 HOURS
Refills: 0 | Status: DISCONTINUED | OUTPATIENT
Start: 2019-07-30 | End: 2019-07-31

## 2019-07-30 RX ORDER — ONDANSETRON 8 MG/1
4 TABLET, FILM COATED ORAL EVERY 6 HOURS
Refills: 0 | Status: DISCONTINUED | OUTPATIENT
Start: 2019-07-30 | End: 2019-08-01

## 2019-07-30 RX ORDER — ALBUTEROL 90 UG/1
2 AEROSOL, METERED ORAL EVERY 6 HOURS
Refills: 0 | Status: DISCONTINUED | OUTPATIENT
Start: 2019-07-30 | End: 2019-08-01

## 2019-07-30 RX ORDER — ACETAMINOPHEN 500 MG
975 TABLET ORAL ONCE
Refills: 0 | Status: COMPLETED | OUTPATIENT
Start: 2019-07-30 | End: 2019-07-30

## 2019-07-30 RX ORDER — SENNA PLUS 8.6 MG/1
2 TABLET ORAL AT BEDTIME
Refills: 0 | Status: DISCONTINUED | OUTPATIENT
Start: 2019-07-30 | End: 2019-08-01

## 2019-07-30 RX ORDER — FERROUS SULFATE 325(65) MG
325 TABLET ORAL DAILY
Refills: 0 | Status: DISCONTINUED | OUTPATIENT
Start: 2019-07-30 | End: 2019-08-01

## 2019-07-30 RX ORDER — SODIUM CHLORIDE 9 MG/ML
1000 INJECTION, SOLUTION INTRAVENOUS
Refills: 0 | Status: DISCONTINUED | OUTPATIENT
Start: 2019-07-30 | End: 2019-07-31

## 2019-07-30 RX ORDER — ONDANSETRON 8 MG/1
4 TABLET, FILM COATED ORAL ONCE
Refills: 0 | Status: DISCONTINUED | OUTPATIENT
Start: 2019-07-30 | End: 2019-07-30

## 2019-07-30 RX ORDER — MONTELUKAST 4 MG/1
10 TABLET, CHEWABLE ORAL DAILY
Refills: 0 | Status: DISCONTINUED | OUTPATIENT
Start: 2019-07-30 | End: 2019-08-01

## 2019-07-30 RX ORDER — OXYBUTYNIN CHLORIDE 5 MG
5 TABLET ORAL
Refills: 0 | Status: DISCONTINUED | OUTPATIENT
Start: 2019-07-30 | End: 2019-08-01

## 2019-07-30 RX ORDER — FENTANYL CITRATE 50 UG/ML
25 INJECTION INTRAVENOUS
Refills: 0 | Status: DISCONTINUED | OUTPATIENT
Start: 2019-07-30 | End: 2019-07-30

## 2019-07-30 RX ORDER — ACETAMINOPHEN 500 MG
650 TABLET ORAL EVERY 6 HOURS
Refills: 0 | Status: DISCONTINUED | OUTPATIENT
Start: 2019-07-30 | End: 2019-08-01

## 2019-07-30 RX ORDER — HYDROMORPHONE HYDROCHLORIDE 2 MG/ML
0.5 INJECTION INTRAMUSCULAR; INTRAVENOUS; SUBCUTANEOUS EVERY 4 HOURS
Refills: 0 | Status: DISCONTINUED | OUTPATIENT
Start: 2019-07-30 | End: 2019-08-01

## 2019-07-30 RX ORDER — MAGNESIUM HYDROXIDE 400 MG/1
30 TABLET, CHEWABLE ORAL DAILY
Refills: 0 | Status: DISCONTINUED | OUTPATIENT
Start: 2019-07-30 | End: 2019-08-01

## 2019-07-30 RX ADMIN — OXYCODONE HYDROCHLORIDE 10 MILLIGRAM(S): 5 TABLET ORAL at 20:41

## 2019-07-30 RX ADMIN — Medication 5 MILLIGRAM(S): at 19:23

## 2019-07-30 RX ADMIN — SODIUM CHLORIDE 3 MILLILITER(S): 9 INJECTION INTRAMUSCULAR; INTRAVENOUS; SUBCUTANEOUS at 21:20

## 2019-07-30 RX ADMIN — OXYCODONE HYDROCHLORIDE 10 MILLIGRAM(S): 5 TABLET ORAL at 21:40

## 2019-07-30 RX ADMIN — OXYCODONE HYDROCHLORIDE 10 MILLIGRAM(S): 5 TABLET ORAL at 16:30

## 2019-07-30 RX ADMIN — SENNA PLUS 2 TABLET(S): 8.6 TABLET ORAL at 21:21

## 2019-07-30 RX ADMIN — Medication 975 MILLIGRAM(S): at 14:34

## 2019-07-30 RX ADMIN — Medication 100 MILLIGRAM(S): at 21:20

## 2019-07-30 RX ADMIN — Medication 100 MILLIGRAM(S): at 17:16

## 2019-07-30 RX ADMIN — Medication 975 MILLIGRAM(S): at 07:52

## 2019-07-30 RX ADMIN — GABAPENTIN 600 MILLIGRAM(S): 400 CAPSULE ORAL at 07:52

## 2019-07-30 RX ADMIN — Medication 975 MILLIGRAM(S): at 22:58

## 2019-07-30 RX ADMIN — Medication 975 MILLIGRAM(S): at 21:21

## 2019-07-30 NOTE — PROGRESS NOTE ADULT - SUBJECTIVE AND OBJECTIVE BOX
PMD : Job       Patient is a 64y old  Female who is s/p R TKA , POD # 0 . Seen on PACU . Tolerated procedure well , c/o not being able to move LE AND B/L LE numbness ( s/p epidural anesthesia )     CC: R knee chronic pain , s/p R TKA - now with b/l LE numbness / unable to move ext due to epidural anesthesia       HPI:  This is a 64 y.o female who  reports a several year duration of right knee pain.  She has undergone PT, Cortisone injections and gel injections with minimal relief. Now s/p R TKA , POD # 0 .       PAST MEDICAL & SURGICAL HISTORY:  IBS (irritable bowel syndrome)  Risk factors for obstructive sleep apnea  Endometrial cancer  Uterine polyp  Acute pain: left shoulder  Overactive bladder  GERD (gastroesophageal reflux disease)  Asthma - mild , stable   Hypertension  S/P total hysterectomy  S/P tendon repair: R hand  S/P dilatation and curettage  S/P cholecystectomy  S/P arthroscopy of shoulder  S/P right knee arthroscopy      Social History:  Tobacco - smoked only 1 yr at age 21   ETOH - occasionally   Illicit drug abuse - denies    FAMILY HISTORY:  FH: myocardial infarction (Father)  FH: diabetes mellitus (Father)  FH: colon cancer  FH: hypertension (Father)      Allergies    sulfa drugs (Rash)    Intolerances      HOME MEDICATIONS :     · 	losartan-hydroCHLOROthiazide 100 mg-25 mg oral tablet: Last Dose Taken:  , 1 tab(s) orally once a day  · 	PriLOSEC 40 mg oral delayed release capsule: Last Dose Taken:  , 1 cap(s) orally once a day  · 	Singulair 10 mg oral tablet: Last Dose Taken:  , 1 tab(s) orally once a day  · 	diclofenac sodium 75 mg oral delayed release tablet: Last Dose Taken:  , 1 tab(s) orally 2 times a day  · 	ProAir HFA 90 mcg/inh inhalation aerosol: Last Dose Taken:    · 	trospium 60 mg oral capsule, extended release: Last Dose Taken:  , 1 cap(s) orally  REVIEW OF SYSTEMS:    CONSTITUTIONAL: No fever, weight loss, or fatigue  EYES: No eye pain, visual disturbances, or discharge  NECK: No pain or stiffness  RESPIRATORY: No cough, wheezing, chills or hemoptysis; No shortness of breath  CARDIOVASCULAR: No chest pain, palpitations, dizziness, or leg swelling  GASTROINTESTINAL: No abdominal or epigastric pain. No nausea, vomiting   GENITOURINARY: No dysuria, frequency, hematuria, or incontinence  NEUROLOGICAL: Mild post op  headaches + , no memory loss  or tremors . B/L LE numbness / unable to move yet post epidural anesthesia   SKIN: No itching, burning, rashes, or lesions   LYMPH NODES: No enlarged glands  ENDOCRINE: No heat or cold intolerance; No hair loss  MUSCULOSKELETAL: R knee chronic pain   PSYCHIATRIC: No depression, anxiety, mood swings, or difficulty sleeping  HEME/LYMPH: No easy bruising, or bleeding gums  ALLERGY AND IMMUNOLOGIC: No hives or eczema    MEDICATIONS  (STANDING):  acetaminophen   Tablet .. 975 milliGRAM(s) Oral every 8 hours  ceFAZolin   IVPB 2000 milliGRAM(s) IV Intermittent once  ceFAZolin   IVPB 2000 milliGRAM(s) IV Intermittent <User Schedule>  lactated ringers. 1000 milliLiter(s) (125 mL/Hr) IV Continuous <Continuous>  tranexamic acid IVPB 1000 milliGRAM(s) IV Intermittent once  tranexamic acid IVPB 1000 milliGRAM(s) IV Intermittent once    MEDICATIONS  (PRN):  acetaminophen   Tablet .. 650 milliGRAM(s) Oral every 6 hours PRN Temp greater or equal to 38C (100.4F)  fentaNYL    Injectable 25 MICROGram(s) IV Push every 5 minutes PRN Moderate Pain (4 - 6)  ondansetron Injectable 4 milliGRAM(s) IV Push once PRN Nausea and/or Vomiting      Vital Signs Last 24 Hrs  T(C): 36.7 (30 Jul 2019 11:32), Max: 37.7 (30 Jul 2019 06:40)  T(F): 98 (30 Jul 2019 11:32), Max: 99.8 (30 Jul 2019 06:40)  HR: 93 (30 Jul 2019 13:15) (92 - 99)  BP: 117/72 (30 Jul 2019 13:15) (99/49 - 121/81)  BP(mean): --  RR: 14 (30 Jul 2019 13:15) (14 - 16)  SpO2: 96% (30 Jul 2019 13:15) (94% - 97%)    PHYSICAL EXAM:    GENERAL: NAD, well-groomed, well-developed  HEAD:  Atraumatic, Normocephalic  EYES: EOMI, PERRLA, conjunctiva and sclera clear  NECK: Supple, No JVD, Normal thyroid  NERVOUS SYSTEM:  Alert & Oriented X3, Good concentration , still unable to move LE ( post epidural anesthesia ) , decreased sensation of B/L LE   CHEST/LUNG: CTA  b/l,  no rales, rhonchi, wheezing, or rubs  HEART: Regular rate and rhythm; No murmurs, rubs, or gallops  ABDOMEN: Soft, Nontender, Nondistended; Bowel sounds present  EXTREMITIES:  2+ Peripheral Pulses, No clubbing, cyanosis, or edema ,   LYMPH: No lymphadenopathy noted  SKIN: No rashes or lesions    LABS: PENDING     RADIOLOGY & ADDITIONAL STUDIES:  < from: Xray Knee 1 or 2 Views, Right (07.30.19 @ 11:27) >   EXAM:  KNEE-RIGHT                          PROCEDURE DATE:  07/30/2019          INTERPRETATION:  HISTORY: Postoperative  knee replacement.    Two views of the RIGHT knee are submitted.    Evaluation demonstrates the presence of a tricompartmental knee   replacement with the femoral, tibial and patellar components in proper   anatomic alignment. There is no fracture .  Impression:  Knee prosthetic components in proper anatomical alignment.    SUSANA NG M.D., ATTENDING RADIOLOGIST  This document has been electronically signed. Jul 30 2019 11:57AM        < end of copied text >

## 2019-07-30 NOTE — DISCHARGE NOTE PROVIDER - NSDCFUSCHEDAPPT_GEN_ALL_CORE_FT
DELISA EAGLE ; 08/21/2019 ; [facility] [address] ; 09/17/2019 ; [facility] [address] ; 09/26/2019 ; [facility] [address] [place] [address]  DELISA EAGLE ; 10/23/2019 ; [facility] [address] DELISA [address]AGLE ; 08/21/2019 ; [facility] [address] ; 09/17/2019 ; [facility] [address] ; 09/26/2019 ; [facility] [address] [place] [address]  DELISA EAGLE ; 10/23/2019 ; [facility] [address]

## 2019-07-30 NOTE — DISCHARGE NOTE PROVIDER - CARE PROVIDER_API CALL
Karlos De La Vega)  Orthopaedic Surgery  [address] [facility] [address]  [place] [address]  Phone: [phone]  Fax: [phone]  Follow Up Time:

## 2019-07-30 NOTE — DISCHARGE NOTE PROVIDER - HOSPITAL COURSE
The patient underwent a RIGHT TOTAL KNEE REPLACEMENT on 7/30/19. The patient received antibiotics consistent with SCIP guidelines. The patient underwent the procedure and had no intra-operative complications. Post-operatively, the patient was seen by medicine and PT. The patient received ASPIRIN for DVTP. The patient received pain medications per orthopedic pain management protocol and the pain was appropriately controlled. The patient did not have any post-operative medical complications. The patient was discharged in stable condition.

## 2019-07-30 NOTE — PHYSICAL THERAPY INITIAL EVALUATION ADULT - ADDITIONAL COMMENTS
pt reports that prior to surgery her right achilles tendon was bothering her after a long drive but does not bother her now. Pt has 3 steps to enter the home, a platform step then 3 more steps. Pt reporting the bottom 3 steps don't have a HR but you can reach the top handrail from the base of the steps. Pt now owns a RW.

## 2019-07-30 NOTE — DISCHARGE NOTE PROVIDER - NSDCFUADDINST_GEN_ALL_CORE_FT
The patient will be seen in the office in 3 weeks for wound check. Sutures/Staples/Tape will be removed at that time. Patient may shower after post-op day #3 (8/2/19). The dressing is to be removed on post op day #9 (8/8/19). IF THE DRESSING BECOMES SOILED BEFORE THE REMOVAL DATE, CHANGE WITH A SIMILAR DRESSING. IF THE DRESSING BECOMES STAINED WITH DISCHARGE, CONTACT THE OFFICE FOR FURTHER DIRECTIONS. The patient will contact the office if the wound becomes red, has increasing pain, develops bleeding or discharge, an injury occurs, or has other concerns. The patient will continue PT consistent with total knee replacement. The patient will continue aspirin 325mg twice daily for 6 weeks for blood clot prevention.  The patient will take OXYCODONE AND TYLENOL for pain control and titrate according to prescription and patient needs. The patient will take Senna-S while taking oxycodone to prevent narcotic associated constipation.  Additionally, increase water intake (drink at least 8 glasses of water daily) and try adding fiber to the diet by eating fruits, vegetables and foods that are rich in grains. If constipation is experienced, contact the medical/primary care provider to discuss further treatment options. The patient is FULL weight bearing. Elevation of the lower leg is recommended to reduce swelling.

## 2019-07-30 NOTE — PROGRESS NOTE ADULT - ASSESSMENT
This is a 64 y.o female who  reports a several year duration of right knee pain.  She has undergone PT, Cortisone injections and gel injections with minimal relief. Now s/p R TKA , POD # 0 .

## 2019-07-30 NOTE — BRIEF OPERATIVE NOTE - NSICDXBRIEFPROCEDURE_GEN_ALL_CORE_FT
Speaking Coherently PROCEDURES:  TKA (total knee arthroplasty) 30-Jul-2019 10:38:41  Karlso De La Vega

## 2019-07-30 NOTE — PROGRESS NOTE ADULT - SUBJECTIVE AND OBJECTIVE BOX
Orthopedic PA Postop Note  Patient S/P RIGHT TKA  Patient in bed comfortable   RIGHT Leg  Dressing C/D/I - ACE wrap without staining  DP Pulse intact   Calf Soft NT  Dorsi/Plantar Flexion/EHL/FHL intact   Sensation intact to light touch    Vital Signs Last 24 Hrs  T(C): 36.7 (30 Jul 2019 19:54), Max: 37.7 (30 Jul 2019 06:40)  T(F): 98.1 (30 Jul 2019 19:54), Max: 99.8 (30 Jul 2019 06:40)  HR: 92 (30 Jul 2019 19:54) (90 - 99)  BP: 124/73 (30 Jul 2019 19:54) (99/49 - 125/78)  BP(mean): --  RR: 18 (30 Jul 2019 19:54) (14 - 18)  SpO2: 96% (30 Jul 2019 19:54) (94% - 97%)    < from: Xray Knee 1 or 2 Views, Right (07.30.19 @ 11:27) >     EXAM:  KNEE-RIGHT                          PROCEDURE DATE:  07/30/2019          INTERPRETATION:  HISTORY: Postoperative  knee replacement.    Two views of the RIGHT knee are submitted.    Evaluation demonstrates the presence of a tricompartmental knee   replacement with the femoral, tibial and patellar components in proper   anatomic alignment. There is no fracture .  Impression:  Knee prosthetic components in proper anatomical alignment.                  SUSANA NG M.D., ATTENDING RADIOLOGIST  This document has been electronically signed. Jul 30 2019 11:57AM              < end of copied text >      A/P: 64F S/P RIGHT total knee arthroplasty  1. DVTP - ASA  2. Physical Therapy   3. Pain Control as clinically indicated

## 2019-07-31 ENCOUNTER — TRANSCRIPTION ENCOUNTER (OUTPATIENT)
Age: 64
End: 2019-07-31

## 2019-07-31 LAB
ANION GAP SERPL CALC-SCNC: 8 MMOL/L — SIGNIFICANT CHANGE UP (ref 5–17)
BUN SERPL-MCNC: 20 MG/DL — SIGNIFICANT CHANGE UP (ref 8–20)
CALCIUM SERPL-MCNC: 8.8 MG/DL — SIGNIFICANT CHANGE UP (ref 8.6–10.2)
CHLORIDE SERPL-SCNC: 105 MMOL/L — SIGNIFICANT CHANGE UP (ref 98–107)
CO2 SERPL-SCNC: 28 MMOL/L — SIGNIFICANT CHANGE UP (ref 22–29)
CREAT SERPL-MCNC: 0.87 MG/DL — SIGNIFICANT CHANGE UP (ref 0.5–1.3)
GLUCOSE SERPL-MCNC: 101 MG/DL — SIGNIFICANT CHANGE UP (ref 70–115)
HCT VFR BLD CALC: 33.5 % — LOW (ref 34.5–45)
HGB BLD-MCNC: 10.3 G/DL — LOW (ref 11.5–15.5)
MCHC RBC-ENTMCNC: 29.4 PG — SIGNIFICANT CHANGE UP (ref 27–34)
MCHC RBC-ENTMCNC: 30.7 GM/DL — LOW (ref 32–36)
MCV RBC AUTO: 95.7 FL — SIGNIFICANT CHANGE UP (ref 80–100)
PLATELET # BLD AUTO: 270 K/UL — SIGNIFICANT CHANGE UP (ref 150–400)
POTASSIUM SERPL-MCNC: 4.2 MMOL/L — SIGNIFICANT CHANGE UP (ref 3.5–5.3)
POTASSIUM SERPL-SCNC: 4.2 MMOL/L — SIGNIFICANT CHANGE UP (ref 3.5–5.3)
RBC # BLD: 3.5 M/UL — LOW (ref 3.8–5.2)
RBC # FLD: 13.7 % — SIGNIFICANT CHANGE UP (ref 10.3–14.5)
SODIUM SERPL-SCNC: 141 MMOL/L — SIGNIFICANT CHANGE UP (ref 135–145)
WBC # BLD: 17.12 K/UL — HIGH (ref 3.8–10.5)
WBC # FLD AUTO: 17.12 K/UL — HIGH (ref 3.8–10.5)

## 2019-07-31 PROCEDURE — 99232 SBSQ HOSP IP/OBS MODERATE 35: CPT

## 2019-07-31 RX ORDER — OXYCODONE HYDROCHLORIDE 5 MG/1
20 TABLET ORAL EVERY 12 HOURS
Refills: 0 | Status: DISCONTINUED | OUTPATIENT
Start: 2019-07-31 | End: 2019-08-01

## 2019-07-31 RX ORDER — DIPHENHYDRAMINE HCL 50 MG
25 CAPSULE ORAL DAILY
Refills: 0 | Status: COMPLETED | OUTPATIENT
Start: 2019-07-31 | End: 2019-07-31

## 2019-07-31 RX ORDER — OXYCODONE HYDROCHLORIDE 5 MG/1
5 TABLET ORAL
Refills: 0 | Status: DISCONTINUED | OUTPATIENT
Start: 2019-07-31 | End: 2019-08-01

## 2019-07-31 RX ORDER — BUDESONIDE AND FORMOTEROL FUMARATE DIHYDRATE 160; 4.5 UG/1; UG/1
2 AEROSOL RESPIRATORY (INHALATION)
Refills: 0 | Status: DISCONTINUED | OUTPATIENT
Start: 2019-07-31 | End: 2019-08-01

## 2019-07-31 RX ORDER — LANOLIN ALCOHOL/MO/W.PET/CERES
3 CREAM (GRAM) TOPICAL AT BEDTIME
Refills: 0 | Status: DISCONTINUED | OUTPATIENT
Start: 2019-07-31 | End: 2019-08-01

## 2019-07-31 RX ORDER — OXYCODONE HYDROCHLORIDE 5 MG/1
10 TABLET ORAL
Refills: 0 | Status: DISCONTINUED | OUTPATIENT
Start: 2019-07-31 | End: 2019-08-01

## 2019-07-31 RX ORDER — KETOROLAC TROMETHAMINE 30 MG/ML
15 SYRINGE (ML) INJECTION EVERY 6 HOURS
Refills: 0 | Status: DISCONTINUED | OUTPATIENT
Start: 2019-07-31 | End: 2019-08-01

## 2019-07-31 RX ADMIN — OXYCODONE HYDROCHLORIDE 10 MILLIGRAM(S): 5 TABLET ORAL at 13:10

## 2019-07-31 RX ADMIN — HYDROMORPHONE HYDROCHLORIDE 0.5 MILLIGRAM(S): 2 INJECTION INTRAMUSCULAR; INTRAVENOUS; SUBCUTANEOUS at 14:08

## 2019-07-31 RX ADMIN — SENNA PLUS 2 TABLET(S): 8.6 TABLET ORAL at 21:50

## 2019-07-31 RX ADMIN — Medication 3 MILLIGRAM(S): at 21:55

## 2019-07-31 RX ADMIN — OXYCODONE HYDROCHLORIDE 10 MILLIGRAM(S): 5 TABLET ORAL at 12:12

## 2019-07-31 RX ADMIN — Medication 975 MILLIGRAM(S): at 21:50

## 2019-07-31 RX ADMIN — OXYCODONE HYDROCHLORIDE 10 MILLIGRAM(S): 5 TABLET ORAL at 22:50

## 2019-07-31 RX ADMIN — SODIUM CHLORIDE 3 MILLILITER(S): 9 INJECTION INTRAMUSCULAR; INTRAVENOUS; SUBCUTANEOUS at 05:26

## 2019-07-31 RX ADMIN — Medication 15 MILLIGRAM(S): at 16:41

## 2019-07-31 RX ADMIN — Medication 15 MILLIGRAM(S): at 16:55

## 2019-07-31 RX ADMIN — OXYCODONE HYDROCHLORIDE 10 MILLIGRAM(S): 5 TABLET ORAL at 00:48

## 2019-07-31 RX ADMIN — OXYCODONE HYDROCHLORIDE 10 MILLIGRAM(S): 5 TABLET ORAL at 15:16

## 2019-07-31 RX ADMIN — Medication 975 MILLIGRAM(S): at 13:53

## 2019-07-31 RX ADMIN — SODIUM CHLORIDE 3 MILLILITER(S): 9 INJECTION INTRAMUSCULAR; INTRAVENOUS; SUBCUTANEOUS at 16:09

## 2019-07-31 RX ADMIN — Medication 100 MILLIGRAM(S): at 05:24

## 2019-07-31 RX ADMIN — Medication 25 MILLIGRAM(S): at 22:11

## 2019-07-31 RX ADMIN — Medication 325 MILLIGRAM(S): at 12:12

## 2019-07-31 RX ADMIN — Medication 975 MILLIGRAM(S): at 05:52

## 2019-07-31 RX ADMIN — PANTOPRAZOLE SODIUM 40 MILLIGRAM(S): 20 TABLET, DELAYED RELEASE ORAL at 05:25

## 2019-07-31 RX ADMIN — OXYCODONE HYDROCHLORIDE 10 MILLIGRAM(S): 5 TABLET ORAL at 08:44

## 2019-07-31 RX ADMIN — OXYCODONE HYDROCHLORIDE 10 MILLIGRAM(S): 5 TABLET ORAL at 21:50

## 2019-07-31 RX ADMIN — SODIUM CHLORIDE 3 MILLILITER(S): 9 INJECTION INTRAMUSCULAR; INTRAVENOUS; SUBCUTANEOUS at 21:42

## 2019-07-31 RX ADMIN — OXYCODONE HYDROCHLORIDE 10 MILLIGRAM(S): 5 TABLET ORAL at 01:25

## 2019-07-31 RX ADMIN — Medication 325 MILLIGRAM(S): at 05:24

## 2019-07-31 RX ADMIN — Medication 100 MILLIGRAM(S): at 12:12

## 2019-07-31 RX ADMIN — Medication 5 MILLIGRAM(S): at 17:43

## 2019-07-31 RX ADMIN — Medication 100 MILLIGRAM(S): at 00:48

## 2019-07-31 RX ADMIN — Medication 325 MILLIGRAM(S): at 17:43

## 2019-07-31 RX ADMIN — MONTELUKAST 10 MILLIGRAM(S): 4 TABLET, CHEWABLE ORAL at 21:50

## 2019-07-31 RX ADMIN — HYDROMORPHONE HYDROCHLORIDE 0.5 MILLIGRAM(S): 2 INJECTION INTRAMUSCULAR; INTRAVENOUS; SUBCUTANEOUS at 13:53

## 2019-07-31 RX ADMIN — OXYCODONE HYDROCHLORIDE 10 MILLIGRAM(S): 5 TABLET ORAL at 18:39

## 2019-07-31 RX ADMIN — Medication 975 MILLIGRAM(S): at 22:50

## 2019-07-31 RX ADMIN — OXYCODONE HYDROCHLORIDE 20 MILLIGRAM(S): 5 TABLET ORAL at 17:43

## 2019-07-31 RX ADMIN — Medication 5 MILLIGRAM(S): at 05:24

## 2019-07-31 RX ADMIN — HYDROMORPHONE HYDROCHLORIDE 0.5 MILLIGRAM(S): 2 INJECTION INTRAMUSCULAR; INTRAVENOUS; SUBCUTANEOUS at 19:53

## 2019-07-31 RX ADMIN — OXYCODONE HYDROCHLORIDE 10 MILLIGRAM(S): 5 TABLET ORAL at 19:33

## 2019-07-31 RX ADMIN — OXYCODONE HYDROCHLORIDE 10 MILLIGRAM(S): 5 TABLET ORAL at 05:30

## 2019-07-31 RX ADMIN — Medication 975 MILLIGRAM(S): at 05:24

## 2019-07-31 RX ADMIN — ONDANSETRON 4 MILLIGRAM(S): 8 TABLET, FILM COATED ORAL at 14:00

## 2019-07-31 RX ADMIN — OXYCODONE HYDROCHLORIDE 10 MILLIGRAM(S): 5 TABLET ORAL at 16:15

## 2019-07-31 RX ADMIN — OXYCODONE HYDROCHLORIDE 10 MILLIGRAM(S): 5 TABLET ORAL at 04:39

## 2019-07-31 RX ADMIN — Medication 975 MILLIGRAM(S): at 13:10

## 2019-07-31 RX ADMIN — HYDROMORPHONE HYDROCHLORIDE 0.5 MILLIGRAM(S): 2 INJECTION INTRAMUSCULAR; INTRAVENOUS; SUBCUTANEOUS at 20:09

## 2019-07-31 RX ADMIN — Medication 100 MILLIGRAM(S): at 21:50

## 2019-07-31 NOTE — PROGRESS NOTE ADULT - SUBJECTIVE AND OBJECTIVE BOX
CC: Follow up POD 1     INTERVAL HPI/OVERNIGHT EVENTS: Patient seen and examined, no acute complaints overnight. Pain controlled. BSP of 106 this morning but patient was asymptomatic. Ambulated with PT this morning.       Vital Signs Last 24 Hrs  T(C): 36.7 (31 Jul 2019 08:47), Max: 36.9 (30 Jul 2019 23:47)  T(F): 98 (31 Jul 2019 08:47), Max: 98.4 (30 Jul 2019 23:47)  HR: 84 (31 Jul 2019 08:47) (84 - 99)  BP: 106/69 (31 Jul 2019 08:47) (99/49 - 125/78)  BP(mean): --  RR: 18 (31 Jul 2019 08:47) (14 - 18)  SpO2: 99% (31 Jul 2019 08:47) (94% - 99%)    PHYSICAL EXAM:    GENERAL: NAD, AOX3  ENMT: Moist mucous membranes  NECK: Supple, No JVD  CHEST/LUNG: Clear to auscultation bilaterally; No rales, rhonchi, wheezing, or rubs  HEART: Regular rate and rhythm; No murmurs, rubs, or gallops  ABDOMEN: Soft, Nontender, Nondistended; Bowel sounds present  EXTREMITIES:  2+ Peripheral Pulses, No clubbing, cyanosis, or edema  RLE: Dressing in place         MEDICATIONS  (STANDING):  acetaminophen   Tablet .. 975 milliGRAM(s) Oral every 8 hours  aspirin enteric coated 325 milliGRAM(s) Oral two times a day  buDESOnide  80 MICROgram(s)/formoterol 4.5 MICROgram(s) Inhaler 2 Puff(s) Inhalation two times a day  ceFAZolin   IVPB 2000 milliGRAM(s) IV Intermittent once  docusate sodium 100 milliGRAM(s) Oral three times a day  ferrous    sulfate 325 milliGRAM(s) Oral daily  lactated ringers. 1000 milliLiter(s) (125 mL/Hr) IV Continuous <Continuous>  melatonin 3 milliGRAM(s) Oral at bedtime  montelukast 10 milliGRAM(s) Oral daily  oxybutynin 5 milliGRAM(s) Oral two times a day  pantoprazole    Tablet 40 milliGRAM(s) Oral before breakfast  senna 2 Tablet(s) Oral at bedtime  sodium chloride 0.9% lock flush 3 milliLiter(s) IV Push every 8 hours  tranexamic acid IVPB 1000 milliGRAM(s) IV Intermittent once  tranexamic acid IVPB 1000 milliGRAM(s) IV Intermittent once    MEDICATIONS  (PRN):  acetaminophen   Tablet .. 650 milliGRAM(s) Oral every 6 hours PRN Temp greater or equal to 38C (100.4F)  ALBUTerol    90 MICROgram(s) HFA Inhaler 2 Puff(s) Inhalation every 6 hours PRN Shortness of Breath and/or Wheezing  aluminum hydroxide/magnesium hydroxide/simethicone Suspension 30 milliLiter(s) Oral four times a day PRN Indigestion  HYDROmorphone  Injectable 0.5 milliGRAM(s) IV Push every 4 hours PRN Severe Pain (7 - 10)/breakthrough pain  magnesium hydroxide Suspension 30 milliLiter(s) Oral daily PRN Constipation  ondansetron Injectable 4 milliGRAM(s) IV Push every 6 hours PRN Nausea and/or Vomiting  oxyCODONE    IR 5 milliGRAM(s) Oral every 3 hours PRN Mild Pain (1 - 3)  oxyCODONE    IR 10 milliGRAM(s) Oral every 3 hours PRN Moderate Pain (4 - 6)      Allergies    sulfa drugs (Rash)    Intolerances          LABS:                          10.3   17.12 )-----------( 270      ( 31 Jul 2019 05:44 )             33.5     07-31    141  |  105  |  20.0  ----------------------------<  101  4.2   |  28.0  |  0.87    Ca    8.8      31 Jul 2019 05:44            RADIOLOGY & ADDITIONAL TESTS:

## 2019-07-31 NOTE — PROGRESS NOTE ADULT - ASSESSMENT
The patient is a 64 year old female with a history of hypertension, osteoarthritis, asthma and IBS who is now status post right TKA    Assessment/Plan:    1. Osteoarthritis of the right knee now status post TKA  Pain controlled  COntinue pT  continue bowel regimen   ISS    2. Hypertension: Resume losartan and HCTZ in am  Monitor bp    3. Asthma; Resume Advair ( home medication    4. IBS on bentyl at home      VTE; ON aspirin BID

## 2019-07-31 NOTE — PROGRESS NOTE ADULT - SUBJECTIVE AND OBJECTIVE BOX
Patient seen and examined at bedside. comfortable in bed, pain controlled. Denies fever/chills, SOB/chest pain, abdominal pain, numbness/tingling. no complaints.    Vital Signs Last 24 Hrs  T(C): 36.7 (31 Jul 2019 05:34), Max: 36.9 (30 Jul 2019 23:47)  T(F): 98.1 (31 Jul 2019 05:34), Max: 98.4 (30 Jul 2019 23:47)  HR: 91 (31 Jul 2019 05:34) (90 - 99)  BP: 112/72 (31 Jul 2019 05:34) (99/49 - 125/78)  BP(mean): --  RR: 18 (31 Jul 2019 05:34) (14 - 18)  SpO2: 95% (31 Jul 2019 05:34) (94% - 97%)    RLE: Ace bandage dressing C/D/I. SILT. + dorsi/plantarflexion. DP 2+. Calf soft NT B/L.                          10.3   17.12 )-----------( 270      ( 31 Jul 2019 05:44 )             33.5     07-31    141  |  105  |  20.0  ----------------------------<  101  4.2   |  28.0  |  0.87    Ca    8.8      31 Jul 2019 05:44        A/P: 64 y.o F s/p right TKA POD #1  - WBAT  - DVTP  - d/c planning - home likely tomorrow when cleared by PT and medicine

## 2019-07-31 NOTE — OCCUPATIONAL THERAPY INITIAL EVALUATION ADULT - ADDITIONAL COMMENTS
pt lives with spouse in a private home, and can stay on first floor, there is a shower stall with shower seat, raised toilet seats, rw  pt independent prior and drives

## 2019-07-31 NOTE — DISCHARGE NOTE NURSING/CASE MANAGEMENT/SOCIAL WORK - NSDCDPATPORTLINK_GEN_ALL_CORE
You can access the [email or web address], offered by [facility], by registering with the following website: [email or web address]

## 2019-07-31 NOTE — OCCUPATIONAL THERAPY INITIAL EVALUATION ADULT - LOWER BODY DRESSING, ASSISTIVE DEVICE, OT EVAL
educated pt on proper technique and use of hip kit, pt in good understanding and will private purchase/elastic laces/long-handled shoe horn/reacher/sock-aid/dressing stick

## 2019-08-01 VITALS
DIASTOLIC BLOOD PRESSURE: 74 MMHG | RESPIRATION RATE: 18 BRPM | OXYGEN SATURATION: 95 % | HEART RATE: 90 BPM | TEMPERATURE: 99 F | SYSTOLIC BLOOD PRESSURE: 120 MMHG

## 2019-08-01 LAB
ANION GAP SERPL CALC-SCNC: 13 MMOL/L — SIGNIFICANT CHANGE UP (ref 5–17)
BUN SERPL-MCNC: 16 MG/DL — SIGNIFICANT CHANGE UP (ref 8–20)
CALCIUM SERPL-MCNC: 9.2 MG/DL — SIGNIFICANT CHANGE UP (ref 8.6–10.2)
CHLORIDE SERPL-SCNC: 101 MMOL/L — SIGNIFICANT CHANGE UP (ref 98–107)
CO2 SERPL-SCNC: 25 MMOL/L — SIGNIFICANT CHANGE UP (ref 22–29)
CREAT SERPL-MCNC: 0.81 MG/DL — SIGNIFICANT CHANGE UP (ref 0.5–1.3)
GLUCOSE SERPL-MCNC: 74 MG/DL — SIGNIFICANT CHANGE UP (ref 70–115)
HCT VFR BLD CALC: 35.5 % — SIGNIFICANT CHANGE UP (ref 34.5–45)
HGB BLD-MCNC: 10.7 G/DL — LOW (ref 11.5–15.5)
MCHC RBC-ENTMCNC: 29.2 PG — SIGNIFICANT CHANGE UP (ref 27–34)
MCHC RBC-ENTMCNC: 30.1 GM/DL — LOW (ref 32–36)
MCV RBC AUTO: 97 FL — SIGNIFICANT CHANGE UP (ref 80–100)
PLATELET # BLD AUTO: 270 K/UL — SIGNIFICANT CHANGE UP (ref 150–400)
POTASSIUM SERPL-MCNC: 4.1 MMOL/L — SIGNIFICANT CHANGE UP (ref 3.5–5.3)
POTASSIUM SERPL-SCNC: 4.1 MMOL/L — SIGNIFICANT CHANGE UP (ref 3.5–5.3)
RBC # BLD: 3.66 M/UL — LOW (ref 3.8–5.2)
RBC # FLD: 14 % — SIGNIFICANT CHANGE UP (ref 10.3–14.5)
SODIUM SERPL-SCNC: 139 MMOL/L — SIGNIFICANT CHANGE UP (ref 135–145)
WBC # BLD: 13.33 K/UL — HIGH (ref 3.8–10.5)
WBC # FLD AUTO: 13.33 K/UL — HIGH (ref 3.8–10.5)

## 2019-08-01 PROCEDURE — C1713: CPT

## 2019-08-01 PROCEDURE — 80048 BASIC METABOLIC PNL TOTAL CA: CPT

## 2019-08-01 PROCEDURE — C1776: CPT

## 2019-08-01 PROCEDURE — 82962 GLUCOSE BLOOD TEST: CPT

## 2019-08-01 PROCEDURE — 85027 COMPLETE CBC AUTOMATED: CPT

## 2019-08-01 PROCEDURE — 86901 BLOOD TYPING SEROLOGIC RH(D): CPT

## 2019-08-01 PROCEDURE — 97163 PT EVAL HIGH COMPLEX 45 MIN: CPT

## 2019-08-01 PROCEDURE — 86850 RBC ANTIBODY SCREEN: CPT

## 2019-08-01 PROCEDURE — 99232 SBSQ HOSP IP/OBS MODERATE 35: CPT

## 2019-08-01 PROCEDURE — 36415 COLL VENOUS BLD VENIPUNCTURE: CPT

## 2019-08-01 PROCEDURE — 73560 X-RAY EXAM OF KNEE 1 OR 2: CPT

## 2019-08-01 PROCEDURE — 94640 AIRWAY INHALATION TREATMENT: CPT

## 2019-08-01 PROCEDURE — 86900 BLOOD TYPING SEROLOGIC ABO: CPT

## 2019-08-01 PROCEDURE — 97165 OT EVAL LOW COMPLEX 30 MIN: CPT

## 2019-08-01 RX ORDER — DICLOFENAC SODIUM 75 MG/1
1 TABLET, DELAYED RELEASE ORAL
Qty: 0 | Refills: 0 | DISCHARGE

## 2019-08-01 RX ORDER — SENNOSIDES/DOCUSATE SODIUM 8.6MG-50MG
2 TABLET ORAL
Qty: 20 | Refills: 0
Start: 2019-08-01

## 2019-08-01 RX ORDER — DIPHENHYDRAMINE HCL 50 MG
25 CAPSULE ORAL EVERY 4 HOURS
Refills: 0 | Status: DISCONTINUED | OUTPATIENT
Start: 2019-08-01 | End: 2019-08-01

## 2019-08-01 RX ORDER — ASPIRIN/CALCIUM CARB/MAGNESIUM 324 MG
1 TABLET ORAL
Qty: 84 | Refills: 0
Start: 2019-08-01

## 2019-08-01 RX ORDER — SODIUM CHLORIDE 9 MG/ML
500 INJECTION INTRAMUSCULAR; INTRAVENOUS; SUBCUTANEOUS ONCE
Refills: 0 | Status: COMPLETED | OUTPATIENT
Start: 2019-08-01 | End: 2019-08-01

## 2019-08-01 RX ORDER — OXYCODONE HYDROCHLORIDE 5 MG/1
1 TABLET ORAL
Qty: 42 | Refills: 0
Start: 2019-08-01

## 2019-08-01 RX ADMIN — Medication 100 MILLIGRAM(S): at 06:45

## 2019-08-01 RX ADMIN — Medication 975 MILLIGRAM(S): at 14:39

## 2019-08-01 RX ADMIN — Medication 325 MILLIGRAM(S): at 11:25

## 2019-08-01 RX ADMIN — MONTELUKAST 10 MILLIGRAM(S): 4 TABLET, CHEWABLE ORAL at 11:25

## 2019-08-01 RX ADMIN — Medication 25 MILLIGRAM(S): at 06:45

## 2019-08-01 RX ADMIN — Medication 15 MILLIGRAM(S): at 01:20

## 2019-08-01 RX ADMIN — SODIUM CHLORIDE 1000 MILLILITER(S): 9 INJECTION INTRAMUSCULAR; INTRAVENOUS; SUBCUTANEOUS at 07:12

## 2019-08-01 RX ADMIN — OXYCODONE HYDROCHLORIDE 5 MILLIGRAM(S): 5 TABLET ORAL at 14:52

## 2019-08-01 RX ADMIN — Medication 15 MILLIGRAM(S): at 06:45

## 2019-08-01 RX ADMIN — OXYCODONE HYDROCHLORIDE 10 MILLIGRAM(S): 5 TABLET ORAL at 02:11

## 2019-08-01 RX ADMIN — BUDESONIDE AND FORMOTEROL FUMARATE DIHYDRATE 2 PUFF(S): 160; 4.5 AEROSOL RESPIRATORY (INHALATION) at 09:41

## 2019-08-01 RX ADMIN — Medication 5 MILLIGRAM(S): at 06:45

## 2019-08-01 RX ADMIN — Medication 975 MILLIGRAM(S): at 06:44

## 2019-08-01 RX ADMIN — OXYCODONE HYDROCHLORIDE 10 MILLIGRAM(S): 5 TABLET ORAL at 09:15

## 2019-08-01 RX ADMIN — OXYCODONE HYDROCHLORIDE 10 MILLIGRAM(S): 5 TABLET ORAL at 04:22

## 2019-08-01 RX ADMIN — Medication 100 MILLIGRAM(S): at 14:39

## 2019-08-01 RX ADMIN — OXYCODONE HYDROCHLORIDE 5 MILLIGRAM(S): 5 TABLET ORAL at 12:40

## 2019-08-01 RX ADMIN — SODIUM CHLORIDE 3 MILLILITER(S): 9 INJECTION INTRAMUSCULAR; INTRAVENOUS; SUBCUTANEOUS at 14:38

## 2019-08-01 RX ADMIN — Medication 15 MILLIGRAM(S): at 01:11

## 2019-08-01 RX ADMIN — Medication 25 MILLIGRAM(S): at 02:34

## 2019-08-01 RX ADMIN — OXYCODONE HYDROCHLORIDE 10 MILLIGRAM(S): 5 TABLET ORAL at 01:11

## 2019-08-01 RX ADMIN — PANTOPRAZOLE SODIUM 40 MILLIGRAM(S): 20 TABLET, DELAYED RELEASE ORAL at 06:48

## 2019-08-01 RX ADMIN — Medication 15 MILLIGRAM(S): at 11:24

## 2019-08-01 RX ADMIN — Medication 325 MILLIGRAM(S): at 06:45

## 2019-08-01 RX ADMIN — Medication 15 MILLIGRAM(S): at 11:41

## 2019-08-01 RX ADMIN — SODIUM CHLORIDE 3 MILLILITER(S): 9 INJECTION INTRAMUSCULAR; INTRAVENOUS; SUBCUTANEOUS at 06:41

## 2019-08-01 RX ADMIN — Medication 975 MILLIGRAM(S): at 15:30

## 2019-08-01 RX ADMIN — OXYCODONE HYDROCHLORIDE 5 MILLIGRAM(S): 5 TABLET ORAL at 15:30

## 2019-08-01 RX ADMIN — OXYCODONE HYDROCHLORIDE 5 MILLIGRAM(S): 5 TABLET ORAL at 11:26

## 2019-08-01 RX ADMIN — OXYCODONE HYDROCHLORIDE 10 MILLIGRAM(S): 5 TABLET ORAL at 08:16

## 2019-08-01 NOTE — PROGRESS NOTE ADULT - SUBJECTIVE AND OBJECTIVE BOX
Patient seen and examined at bedside. comfortable in bed, c/o mild pain at operative site. Patient admits to increase in pain yesterday, had pain meds adjusted, and pain is much more controlled today. Denies fever/chills, SOB/chest pain, abdominal pain, numbness/tingling. no other complaints at this time.    Vital Signs Last 24 Hrs  T(C): 36.8 (01 Aug 2019 06:39), Max: 37.4 (31 Jul 2019 17:04)  T(F): 98.2 (01 Aug 2019 06:39), Max: 99.3 (31 Jul 2019 17:04)  HR: 88 (01 Aug 2019 06:39) (84 - 95)  BP: 92/60 (01 Aug 2019 06:39) (84/56 - 106/69)  BP(mean): --  RR: 18 (01 Aug 2019 06:39) (18 - 18)  SpO2: 95% (01 Aug 2019 06:39) (95% - 100%)    RLE: Dressing with mild bloody staining mid dressing. Removed, prineo intact. no erythema, no active drainage. SILT. + dorsi/plantarflexion. DP 2+. Calf soft NT B/L.                          10.3   17.12 )-----------( 270      ( 31 Jul 2019 05:44 )             33.5     07-31    141  |  105  |  20.0  ----------------------------<  101  4.2   |  28.0  |  0.87    Ca    8.8      31 Jul 2019 05:44    A/P: 64 y.o F s/p right TKA POD #2  - dressing changed  - WBAT  - DVTP  - d/c planning - home today if cleared by PT and medicine

## 2019-08-01 NOTE — PROGRESS NOTE ADULT - SUBJECTIVE AND OBJECTIVE BOX
CC: S/p Rt TKA  Follow up POD 2    INTERVAL HPI/OVERNIGHT EVENTS: Patient seen and examined, no acute complaints overnight.        Vital Signs Last 24 Hrs  T(C): 36.8 (01 Aug 2019 07:55), Max: 37.4 (31 Jul 2019 17:04)  T(F): 98.2 (01 Aug 2019 07:55), Max: 99.3 (31 Jul 2019 17:04)  HR: 92 (01 Aug 2019 07:55) (88 - 95)  BP: 93/62 (01 Aug 2019 07:55) (84/56 - 106/63)  BP(mean): --  RR: 18 (01 Aug 2019 07:55) (18 - 18)  SpO2: 95% (01 Aug 2019 07:55) (93% - 100%)      PHYSICAL EXAM:    GENERAL: NAD, AOX3  ENMT: Moist mucous membranes  NECK: Supple, No JVD  CHEST/LUNG: Clear to auscultation bilaterally; No rales, rhonchi  HEART: Regular rate and rhythm; No murmur  ABDOMEN: Soft, Nontender, Nondistended; Bowel sounds present  EXTREMITIES:  No clubbing, cyanosis, or edema  RLE: Dressing in place         MEDICATIONS  (STANDING):  acetaminophen   Tablet .. 975 milliGRAM(s) Oral every 8 hours  aspirin enteric coated 325 milliGRAM(s) Oral two times a day  buDESOnide  80 MICROgram(s)/formoterol 4.5 MICROgram(s) Inhaler 2 Puff(s) Inhalation two times a day  ceFAZolin   IVPB 2000 milliGRAM(s) IV Intermittent once  docusate sodium 100 milliGRAM(s) Oral three times a day  ferrous    sulfate 325 milliGRAM(s) Oral daily  hydrochlorothiazide 25 milliGRAM(s) Oral daily  ketorolac   Injectable 15 milliGRAM(s) IV Push every 6 hours  losartan 100 milliGRAM(s) Oral daily  melatonin 3 milliGRAM(s) Oral at bedtime  montelukast 10 milliGRAM(s) Oral daily  oxybutynin 5 milliGRAM(s) Oral two times a day  oxyCODONE  ER Tablet 20 milliGRAM(s) Oral every 12 hours  pantoprazole    Tablet 40 milliGRAM(s) Oral before breakfast  senna 2 Tablet(s) Oral at bedtime  sodium chloride 0.9% lock flush 3 milliLiter(s) IV Push every 8 hours  tranexamic acid IVPB 1000 milliGRAM(s) IV Intermittent once  tranexamic acid IVPB 1000 milliGRAM(s) IV Intermittent once    MEDICATIONS  (PRN):  acetaminophen   Tablet .. 650 milliGRAM(s) Oral every 6 hours PRN Temp greater or equal to 38C (100.4F)  ALBUTerol    90 MICROgram(s) HFA Inhaler 2 Puff(s) Inhalation every 6 hours PRN Shortness of Breath and/or Wheezing  aluminum hydroxide/magnesium hydroxide/simethicone Suspension 30 milliLiter(s) Oral four times a day PRN Indigestion  diphenhydrAMINE 25 milliGRAM(s) Oral every 4 hours PRN Rash and/or Itching  HYDROmorphone  Injectable 0.5 milliGRAM(s) IV Push every 4 hours PRN Severe Pain (7 - 10)/breakthrough pain  magnesium hydroxide Suspension 30 milliLiter(s) Oral daily PRN Constipation  ondansetron Injectable 4 milliGRAM(s) IV Push every 6 hours PRN Nausea and/or Vomiting  oxyCODONE    IR 5 milliGRAM(s) Oral every 3 hours PRN Mild Pain (1 - 3)  oxyCODONE    IR 10 milliGRAM(s) Oral every 3 hours PRN Moderate Pain (4 - 6)        Allergies    sulfa drugs (Rash)    Intolerances          LABS:                                     10.7   13.33 )-----------( 270      ( 01 Aug 2019 07:25 )             35.5   08-01    139  |  101  |  16.0  ----------------------------<  74  4.1   |  25.0  |  0.81    Ca    9.2      01 Aug 2019 07:25              RADIOLOGY & ADDITIONAL TESTS: CC: S/p Rt TKA  Follow up POD 2    INTERVAL HPI/OVERNIGHT EVENTS: Patient seen and examined, no acute events overnight.    pain was poorly controlled yesterday, needed many pain meds to gewt it under control, had itching overnight, got benadryl  Is sleepy today, but pain has improved  ambulated with PT today  denies any nausea/vomiting   poor po intake last evening 2.2 pain       Vital Signs Last 24 Hrs  T(C): 36.8 (01 Aug 2019 07:55), Max: 37.4 (31 Jul 2019 17:04)  T(F): 98.2 (01 Aug 2019 07:55), Max: 99.3 (31 Jul 2019 17:04)  HR: 92 (01 Aug 2019 07:55) (88 - 95)  BP: 93/62 (01 Aug 2019 07:55) (84/56 - 106/63)  BP(mean): --  RR: 18 (01 Aug 2019 07:55) (18 - 18)  SpO2: 95% (01 Aug 2019 07:55) (93% - 100%)      PHYSICAL EXAM:    GENERAL: NAD, AOX3  ENMT: Moist mucous membranes  NECK: Supple, No JVD  CHEST/LUNG: Clear to auscultation bilaterally; No rales, rhonchi  HEART: Regular rate and rhythm; No murmur  ABDOMEN: Soft, Nontender, Nondistended; Bowel sounds present  EXTREMITIES:  No clubbing, cyanosis, or edema  RLE: Dressing in place         MEDICATIONS  (STANDING):  acetaminophen   Tablet .. 975 milliGRAM(s) Oral every 8 hours  aspirin enteric coated 325 milliGRAM(s) Oral two times a day  buDESOnide  80 MICROgram(s)/formoterol 4.5 MICROgram(s) Inhaler 2 Puff(s) Inhalation two times a day  ceFAZolin   IVPB 2000 milliGRAM(s) IV Intermittent once  docusate sodium 100 milliGRAM(s) Oral three times a day  ferrous    sulfate 325 milliGRAM(s) Oral daily  hydrochlorothiazide 25 milliGRAM(s) Oral daily  ketorolac   Injectable 15 milliGRAM(s) IV Push every 6 hours  losartan 100 milliGRAM(s) Oral daily  melatonin 3 milliGRAM(s) Oral at bedtime  montelukast 10 milliGRAM(s) Oral daily  oxybutynin 5 milliGRAM(s) Oral two times a day  oxyCODONE  ER Tablet 20 milliGRAM(s) Oral every 12 hours  pantoprazole    Tablet 40 milliGRAM(s) Oral before breakfast  senna 2 Tablet(s) Oral at bedtime  sodium chloride 0.9% lock flush 3 milliLiter(s) IV Push every 8 hours  tranexamic acid IVPB 1000 milliGRAM(s) IV Intermittent once  tranexamic acid IVPB 1000 milliGRAM(s) IV Intermittent once    MEDICATIONS  (PRN):  acetaminophen   Tablet .. 650 milliGRAM(s) Oral every 6 hours PRN Temp greater or equal to 38C (100.4F)  ALBUTerol    90 MICROgram(s) HFA Inhaler 2 Puff(s) Inhalation every 6 hours PRN Shortness of Breath and/or Wheezing  aluminum hydroxide/magnesium hydroxide/simethicone Suspension 30 milliLiter(s) Oral four times a day PRN Indigestion  diphenhydrAMINE 25 milliGRAM(s) Oral every 4 hours PRN Rash and/or Itching  HYDROmorphone  Injectable 0.5 milliGRAM(s) IV Push every 4 hours PRN Severe Pain (7 - 10)/breakthrough pain  magnesium hydroxide Suspension 30 milliLiter(s) Oral daily PRN Constipation  ondansetron Injectable 4 milliGRAM(s) IV Push every 6 hours PRN Nausea and/or Vomiting  oxyCODONE    IR 5 milliGRAM(s) Oral every 3 hours PRN Mild Pain (1 - 3)  oxyCODONE    IR 10 milliGRAM(s) Oral every 3 hours PRN Moderate Pain (4 - 6)        Allergies    sulfa drugs (Rash)    Intolerances          LABS:                                     10.7   13.33 )-----------( 270      ( 01 Aug 2019 07:25 )             35.5   08-01    139  |  101  |  16.0  ----------------------------<  74  4.1   |  25.0  |  0.81    Ca    9.2      01 Aug 2019 07:25              RADIOLOGY & ADDITIONAL TESTS:

## 2019-08-01 NOTE — PROGRESS NOTE ADULT - ASSESSMENT
64 year old female with a history of hypertension, osteoarthritis, asthma and IBS who is now status post right TKA    Assessment/Plan:    1. Osteoarthritis of the right knee now status post TKA  Pain controlled  COntinue pT  continue bowel regimen   ISS    2. Hypertension: Resume losartan and HCTZ tomorrow     3. Asthma; Resume Advair (home medication)    4. IBS on bentyl at home      VTE; ON aspirin BID 64 year old female with a history of hypertension, osteoarthritis, asthma and IBS who is now status post right TKA    Assessment/Plan:    1. Osteoarthritis of the right knee now status post TKA  Pain controlled  COntinue pT  continue bowel regimen   ISS    2. Hypertension: Resume losartan and HCTZ tomorrow     3. Asthma; Resume Advair (home medication)    4. IBS on bentyl at home    5. acute blood loss anemia - 2/2 surgical blood loss - HD stable - would dc on po iron , f.u with cbc with PMD    6. Leucocytosis - likely reactive - improving.       VTE; ON aspirin BID 64 year old female with a history of hypertension, osteoarthritis, asthma and IBS who is now status post right TKA    Assessment/Plan:    1. Osteoarthritis of the right knee now status post TKA  Pain controlled  COntinue pT  continue bowel regimen   ISS    2. Hypertension: Resume losartan and HCTZ tomorrow . BP soft low today - Pt asymptomatic - suspect most likely 2/2 medications - ct to monitor. If pt remains asymptomatic with ambulation and Pain is controlled, may be able to discharge later today.     3. Asthma; Resume Advair (home medication)    4. IBS on bentyl at home    5. acute blood loss anemia - 2/2 surgical blood loss - HD stable - would dc on po iron , f/u with cbc with PMD    6. Leucocytosis - likely reactive - improving.       VTE; ON aspirin BID

## 2019-08-12 ENCOUNTER — CLINICAL ADVICE (OUTPATIENT)
Age: 64
End: 2019-08-12

## 2019-08-12 PROBLEM — C54.1 MALIGNANT NEOPLASM OF ENDOMETRIUM: Chronic | Status: ACTIVE | Noted: 2019-07-01

## 2019-08-12 PROBLEM — K58.9 IRRITABLE BOWEL SYNDROME WITHOUT DIARRHEA: Chronic | Status: ACTIVE | Noted: 2019-07-01

## 2019-08-12 PROBLEM — Z91.89 OTHER SPECIFIED PERSONAL RISK FACTORS, NOT ELSEWHERE CLASSIFIED: Chronic | Status: ACTIVE | Noted: 2019-07-01

## 2019-08-12 PROBLEM — K58.9 IRRITABLE BOWEL SYNDROME, UNSPECIFIED: Chronic | Status: ACTIVE | Noted: 2019-07-01

## 2019-08-13 ENCOUNTER — APPOINTMENT (OUTPATIENT)
Dept: GYNECOLOGIC ONCOLOGY | Facility: CLINIC | Age: 64
End: 2019-08-13
Payer: COMMERCIAL

## 2019-08-13 VITALS
HEIGHT: 65 IN | OXYGEN SATURATION: 98 % | SYSTOLIC BLOOD PRESSURE: 98 MMHG | DIASTOLIC BLOOD PRESSURE: 67 MMHG | HEART RATE: 91 BPM | BODY MASS INDEX: 32.32 KG/M2 | WEIGHT: 194 LBS | RESPIRATION RATE: 16 BRPM

## 2019-08-13 PROCEDURE — 81002 URINALYSIS NONAUTO W/O SCOPE: CPT

## 2019-08-13 PROCEDURE — 99214 OFFICE O/P EST MOD 30 MIN: CPT

## 2019-08-13 NOTE — HISTORY OF PRESENT ILLNESS
[FreeTextEntry1] : This 63 y/o with a history of stage 1A grade 1 endometrial cancer since 03/2018 presenting today with complaint of vaginal spotting. Patient called office yesterday stating she woke up with blood tinge staining noted on her underwear and had some slight spotting when she wiped after going to the bathroom. Patient recently underwent right knee replacement at [facility] 2 weeks ago and has been on  BID. She discussed case with the PA in the office yesterday who discussed it is likely to new onset use of aspirin that she is having this spotting but had recommended coming in for a vaginal examination to rule out any lesions. Patient also mentioned that prior to the surgery she would take 3-4 Excedrin extra strengths daily which have aspirin in it as well to help with headaches, and has never experienced spotting before. She denies dysuria, fever, increase urinary frequency as well as pelvic pains.

## 2019-08-13 NOTE — PHYSICAL EXAM
[Absent] : Adnexa(ae): Absent [Normal] : Parametria: Normal [de-identified] : Patient was interviewed and examined with chaperone present. Name of Chaperone: REJI Manzanares

## 2019-08-13 NOTE — ASSESSMENT
[FreeTextEntry1] : Discussed with the patient that her physical examination in the office today was with no concerning findings. No evidence of vaginal lesion or active bleeding noted, no obvious masses or abnormalities were visualized or palpated. I discussed with the patient that her episode of spotting is likely to increased use of aspirin due to recent knee replacement as there are no signs of a localized recurrence at this time. I discussed with the patient that she can continue use of aspirin as per orthopedic surgery, if she begins to experience increased episodes of spotting, or vaginal bleeding, as well as pelvic pains or abnormal discharge we would like her to come back to the office for examination with Dr. Danielson and advised her that imaging may be warranted at that time.

## 2019-08-21 ENCOUNTER — APPOINTMENT (OUTPATIENT)
Dept: ORTHOPEDIC SURGERY | Facility: CLINIC | Age: 64
End: 2019-08-21
Payer: COMMERCIAL

## 2019-08-21 VITALS
HEIGHT: 65 IN | DIASTOLIC BLOOD PRESSURE: 83 MMHG | WEIGHT: 194 LBS | HEART RATE: 103 BPM | SYSTOLIC BLOOD PRESSURE: 123 MMHG | BODY MASS INDEX: 32.32 KG/M2

## 2019-08-21 PROCEDURE — 99024 POSTOP FOLLOW-UP VISIT: CPT

## 2019-08-21 PROCEDURE — 73562 X-RAY EXAM OF KNEE 3: CPT

## 2019-08-21 NOTE — ADDENDUM
[FreeTextEntry1] : I, Waqar Faustin, acted solely as a scribe for Dr. Karlos De La Vega on this date 08/21/2019.

## 2019-08-21 NOTE — HISTORY OF PRESENT ILLNESS
[___ Weeks Post Op] : [unfilled] weeks post op [1] : the patient reports pain that is 1/10 in severity [Clean/Dry/Intact] : clean, dry and intact [Swelling] : swollen [Xray (Date:___)] : [unfilled] Xray -  [Doing Well] : is doing well [Excellent Pain Control] : has excellent pain control [No Sign of Infection] : is showing no signs of infection [Chills] : no chills [Constipation] : no constipation [Diarrhea] : no diarrhea [Dysuria] : no dysuria [Fever] : no fever [Vomiting] : no vomiting [Nausea] : no nausea [Healed] : not healed [Erythema] : not erythematous [Discharge] : absent of discharge [Dehiscence] : not dehisced [de-identified] : S/P  Right total knee arthroplasty.\par  Computer assisted tibial resection, OrthAlign procedure. DOS: 07/30/19\par \par  [de-identified] : 64 year old female here for evaluation s/p right TKA DOS 7/30/2019. She is currently in outpatient PT. She is taking Tylenol for pain. She is on  mg for DVT prophylaxis. She is ambulating with a cane. \par She c/o mild numbness to the bottom of the foot.  [de-identified] : 3V xray of the right knee done in office today and reviewed by Dr. Karlos De La Vega demonstrates s/p TKR with implants in good positioning, no sign of wear, loosening or subsidence.  [de-identified] : Right knee exam shows well-healing midline incision, no signs of infection, ROM 2 - 110 degrees, no pain with ROM, ambulating with a walker.  [de-identified] : 64 year old female s/p right TKA DOS 7/30/2019. She will continue with physical therapy. Xrays were reviewed and the patient was reassured that their TKA components are in good position with no signs of loosening or wear. I discussed the importance of antibiotic use with any dental work. I answered all of her questions. F/U 3-4 weeks.

## 2019-09-17 ENCOUNTER — APPOINTMENT (OUTPATIENT)
Dept: ORTHOPEDIC SURGERY | Facility: CLINIC | Age: 64
End: 2019-09-17
Payer: COMMERCIAL

## 2019-09-17 VITALS
BODY MASS INDEX: 32.32 KG/M2 | DIASTOLIC BLOOD PRESSURE: 87 MMHG | WEIGHT: 194 LBS | SYSTOLIC BLOOD PRESSURE: 135 MMHG | HEART RATE: 97 BPM | HEIGHT: 65 IN

## 2019-09-17 PROCEDURE — 99024 POSTOP FOLLOW-UP VISIT: CPT

## 2019-09-17 PROCEDURE — 73562 X-RAY EXAM OF KNEE 3: CPT | Mod: TC,RT

## 2019-09-17 NOTE — HISTORY OF PRESENT ILLNESS
[0] : no pain reported [Healed] : healed [Neuro Intact] : an unremarkable neurological exam [Vascular Intact] : ~T peripheral vascular exam normal [Negative Tasneem's] : maneuvers demonstrated a negative Tasneem's sign [Xray (Date:___)] : [unfilled] Xray -  [Doing Well] : is doing well [No Sign of Infection] : is showing no signs of infection [Adequate Pain Control] : has adequate pain control [Chills] : no chills [Constipation] : no constipation [Diarrhea] : no diarrhea [Dysuria] : no dysuria [Fever] : no fever [Vomiting] : no vomiting [Nausea] : no nausea [Discharge] : absent of discharge [Erythema] : not erythematous [Dehiscence] : not dehisced [Swelling] : not swollen [de-identified] : S/P Right total knee arthroplasty.\par  Computer assisted tibial resection, OrthAlign procedure. DOS: 07/30/19\par  [de-identified] : Examination of right knee demonstrate well-healed incision. Smooth painless range of motion of 0-120 degree\par \par  [de-identified] : patient is 7 weeks from right total knee arthroplasty she is in physical therapy 3 times a week. She is exercising 3 times a day at home on her own. Her pain is controlled with Advil one tablet. She's happy with her progress.\par  [de-identified] : 3 view X-ray of right knee demonstrate implants are in good alignment. No evidence of subsidence or loosening or wear  [de-identified] : Patient will continue with exercise. we discussed use of antibiotic before dental work for 2 years. Rx is sent f/u in 1 month, we will decide return to work date at that point.\par

## 2019-09-26 ENCOUNTER — APPOINTMENT (OUTPATIENT)
Dept: GYNECOLOGIC ONCOLOGY | Facility: CLINIC | Age: 64
End: 2019-09-26
Payer: COMMERCIAL

## 2019-09-26 VITALS
OXYGEN SATURATION: 95 % | HEART RATE: 103 BPM | TEMPERATURE: 99.5 F | DIASTOLIC BLOOD PRESSURE: 71 MMHG | SYSTOLIC BLOOD PRESSURE: 106 MMHG

## 2019-09-26 DIAGNOSIS — N93.9 ABNORMAL UTERINE AND VAGINAL BLEEDING, UNSPECIFIED: ICD-10-CM

## 2019-09-26 PROCEDURE — 99024 POSTOP FOLLOW-UP VISIT: CPT

## 2019-09-26 RX ORDER — DICYCLOMINE HYDROCHLORIDE 10 MG/1
10 CAPSULE ORAL
Refills: 0 | Status: ACTIVE | COMMUNITY

## 2019-09-26 NOTE — PHYSICAL EXAM
[Absent] : Uterus: Absent [Normal] : Bimanual Exam: Normal [de-identified] : Paula Conley was present as chaperone during examination of patient. [de-identified] : no palpable masses

## 2019-09-26 NOTE — REASON FOR VISIT
[FreeTextEntry1] : [facility]\par \par [facility] [phone] at [address] [place] [id number]\par

## 2019-09-26 NOTE — HISTORY OF PRESENT ILLNESS
[FreeTextEntry1] : This 63 y/o with history of stage 1A grade 1 endometrial cancer s/p RA TLH BSO b/l pelvic and para-aortic sentinel lymph node dissection, CHIOMA pineda on 03/27/18 presents for surveillance exam today. States the vaginal staining she had last month resolved. She recuperated well with her right knee replacement. She denies abdominal/pelvic pains, bladder or bowel issues. Reports having a colonoscopy last year prior to her hysterectomy and gets frequent colonoscopies due to family history of colon cancer and personal history of polyps. Reports being up to date with her mammogram and bone density. \par \par  \par

## 2019-11-06 ENCOUNTER — APPOINTMENT (OUTPATIENT)
Dept: ORTHOPEDIC SURGERY | Facility: CLINIC | Age: 64
End: 2019-11-06
Payer: COMMERCIAL

## 2019-11-06 VITALS — WEIGHT: 194 LBS | HEIGHT: 65 IN | BODY MASS INDEX: 32.32 KG/M2

## 2019-11-06 PROCEDURE — 73562 X-RAY EXAM OF KNEE 3: CPT | Mod: RT

## 2019-11-06 PROCEDURE — 72170 X-RAY EXAM OF PELVIS: CPT

## 2019-11-06 NOTE — ADDENDUM
[FreeTextEntry1] : I, Waqar Faustin, acted solely as a scribe for Dr. Karlos De La Vega on this date 11/06/2019.

## 2019-11-06 NOTE — HISTORY OF PRESENT ILLNESS
[Stable] : stable [Standing] : standing [Intermit.] : ~He/She~ states the symptoms seem to be intermittent [Bending] : worsened by bending [Walking] : worsened by walking [Recumbency] : relieved by recumbency [Rest] : relieved by rest [___ wks] : [unfilled] week(s) ago [de-identified] : 64 year old female here for evaluation s/p right TKA DOS 7/30/2019.  patient states having right hip pain, worse with activity.  patient had to stop PT due to pain.  patient states pain started 4 weeks ago with no injury. patient states right knee is feeling well otherwise \par \par

## 2019-11-06 NOTE — PHYSICAL EXAM
[LE] : Sensory: Intact in bilateral lower extremities [ALL] : dorsalis pedis, posterior tibial, femoral, popliteal, and radial 2+ and symmetric bilaterally [Normal] : Alert and in no acute distress [Obese] : obese [Antalgic] : not antalgic [Poor Appearance] : well-appearing [Acute Distress] : not in acute distress [de-identified] : GENERAL APPEARANCE: Well nourished and hydrated, pleasant, alert, and oriented x 3. Appears their stated age. \par HEENT: Normocephalic, extraocular eye motion intact. Nasal septum midline. Oral cavity clear. External auditory canal clear. \par RESPIRATORY: Breath sounds clear and audible in all lobes. No wheezing, No accessory muscle use.\par CARDIOVASCULAR: No apparent abnormalities. No lower leg edema. No varicosities. Pedal pulses are palpable.\par NEUROLOGIC: Sensation is normal, no muscle weakness in the upper or lower extremities.\par DERMATOLOGIC: No apparent skin lesions, moist, warm, no rash.\par SPINE: Cervical spine appears normal and moves freely; thoracic spine appears normal and moves freely; lumbosacral spine appears normal and moves freely, normal, nontender.\par MUSCULOSKELETAL: Hands, wrists, and elbows are normal and move freely, shoulders are normal and move freely.  [de-identified] : Examination of right knee demonstrate well-healed incision. Smooth painless range of motion of 0-120 degrees\par Right hip exam shows able to SLR with pain, negative Stinchfield maneuver, FROM.\par  [de-identified] : 3V xray of the right knee done in office today and reviewed by Dr. Karlos De La Vega demonstrates s/p TKR with implants in good positioning, no sign of wear, loosening or subsidence. \par 1V Xray of the pelvis done in office today and reviewed by Dr. Karlos De La Vega demonstrates mild osteoarthritis of the right hip.

## 2019-11-06 NOTE — DISCUSSION/SUMMARY
[de-identified] : 64 year old female s/p right TKA DOS 7/30/2019. She had been doing well with regards to right knee, but had recent set-back in progress due to flare-up of right hip pain. Xrays were reviewed and the patient was reassured that their right TKA components are in good position with no signs of loosening or wear. She was also reassured that right hip xrays showed only mild osteoarthritis. She is not yet a candidate for right GENIA. Should right hip pain persist or worsen I will order advanced imaging of the right hip for further evaluation. She may continue with physical therapy and low-impact exercises. I discussed the importance of antibiotic use with any dental work. \par  F/U annually from surgical date for radiographic surveillance of right TKA implants. \par \par A knee replacement means resurfacing of all 3 surfaces of the patella, the femur, and tibia with metal and plastic parts. The prosthetic parts are usually cemented into position and well outpatient range of motion from full extension to about 120° of flexion. The postoperative motion, however, is determined by multiple factors, the most important of which is preoperative motion. In general, the better motion preoperatively, the better the motion postoperatively. The operation, pending medical clearance, gently requires hospitalization of 1 to 2 days for one knee, 2 to 4 days for bilateral knee replacements. In general, we prefer to perform the procedure under spinal anesthesia with femoral nerve block and occasional single shot sciatic nerve block. We may ask a patient to give 2 units of blood for bilateral total knee arthroplasties, for one knee we institute a normogram which may include administration of preoperative Procrit. The operative procedure takes probably 1-2 hours. The operation requires a straight incision anywhere from 5-7 inches down from the knee. Post-operatively the patient will be walking the day of surgery. The first couple days are very painful and the pain medication will alleviate, but not eliminate the pain. The patient must really push hard to get range of motion. Our goal for having a person go home as that the range of motion is approximately 0-90° of flexion and that they can walk with a walker or cane. A walking aid is to be dispensed once the patient is secure enough. In general there, there is no cast or brace required with routine knee replacement. In the long term, we do not encourage our patients to run for the sake of running, although pending their preoperative status, we often allow patient to play doubles tennis or comparative activities. We also allowed them to do gentle intermediate downhill skiing if they are truly an expert skier. Biking is encouraged as well swimming. The followup periods are usually 3 weeks, 6 weeks, 3 months, and yearly intervals. Potential complications with total knee replacement included anesthetic complications and death, infection around 1%, nerve damage, by which means peroneal nerve palsy, footdrop or flapping foot with ambulation. This is particularly more apt to occur in the patient with a valgus or knock-knee deformity. The incidence can be quite high in this particular patient population. There will be areas of skin numbness, but this is not an untoward effect nor do we consider it a complication. Other potential complications include dislocation of the patella component, usually less than 2%; loosening of the tibial or femoral component is much more infrequent. Most often this occurs with infection or long-term use. Patient of extreme risk including markedly overweight patient's may be more prone to prosthetic wear. Major blood vessel damage is also extremely rare. Directly because of the anatomic proximity of the popliteal artery this could be lacerated with subsequent repair required. Be it unlikely, disruption of the popliteal artery could theoretically result in amputation. Similarly, infection could theoretically result in amputation if one were to grow out of an organism that cannot be controlled with antibiotics. General medical complications include phlebitis, for which we would prophylactically anticoagulate patients, but could still occur, and fatal pulmonary embolus which has been reported. Cardiovascular problems, such as heart attack or ischemia are always a concern with such hemodynamic changes in the blood vascular system. Other General complications are rare, but anything medicine could theoretically happen. I think the patient understands the risk benefit ratio of total knee replacement and will think about whether there like to pursue with an operation or nonoperative treatment program. I reviewed the plan of care as well as a model of a total knee implant equivalent to the one that will be used for their total knee joint replacement. The patient agreed to the plan of care as well as the use of implants for their knee total joint replacement.

## 2019-11-18 ENCOUNTER — FORM ENCOUNTER (OUTPATIENT)
Age: 64
End: 2019-11-18

## 2019-12-23 ENCOUNTER — APPOINTMENT (OUTPATIENT)
Dept: PEDIATRIC MEDICAL GENETICS | Facility: CLINIC | Age: 64
End: 2019-12-23
Payer: COMMERCIAL

## 2019-12-23 PROCEDURE — 36415 COLL VENOUS BLD VENIPUNCTURE: CPT

## 2019-12-23 PROCEDURE — 99243 OFF/OP CNSLTJ NEW/EST LOW 30: CPT

## 2019-12-26 ENCOUNTER — APPOINTMENT (OUTPATIENT)
Dept: GYNECOLOGIC ONCOLOGY | Facility: CLINIC | Age: 64
End: 2019-12-26
Payer: COMMERCIAL

## 2019-12-26 VITALS
OXYGEN SATURATION: 95 % | RESPIRATION RATE: 16 BRPM | WEIGHT: 195 LBS | SYSTOLIC BLOOD PRESSURE: 111 MMHG | HEART RATE: 107 BPM | BODY MASS INDEX: 31.34 KG/M2 | DIASTOLIC BLOOD PRESSURE: 75 MMHG | HEIGHT: 66 IN

## 2019-12-26 PROCEDURE — 99214 OFFICE O/P EST MOD 30 MIN: CPT

## 2019-12-26 RX ORDER — AMOXICILLIN 500 MG/1
500 TABLET, FILM COATED ORAL
Qty: 8 | Refills: 2 | Status: DISCONTINUED | COMMUNITY
Start: 2019-09-17 | End: 2019-12-26

## 2019-12-26 NOTE — HISTORY OF PRESENT ILLNESS
[FreeTextEntry1] : This 65 y/o with history of stage 1A grade 1 endometrial cancer s/p RA TLH BSO b/l pelvic and para-aortic sentinel lymph node dissection, CHIOMA pineda on 03/27/18 presents for surveillance exam today. She denies any pelvic pain or VB. She continues to do PT after her right knee replacement. Reports having a colonoscopy in 2018 prior to her hysterectomy and gets frequent colonoscopies due to family history of colon cancer and personal history of polyps. She is due for mammogram and bone density scan in April 2020, she will get scripts from Dr. Payton Danielson as she is also due for her annual visit at that time.

## 2019-12-26 NOTE — PHYSICAL EXAM
[Absent] : Uterus: Absent [Normal] : Auscultation of lungs: Clear to auscultation [de-identified] : Paula Conley was present as chaperone during examination of patient. [de-identified] : no palpable masses

## 2019-12-26 NOTE — END OF VISIT
[FreeTextEntry3] : Patient was interviewed and examined with chaperone present. Name of Chaperone: Daisy Espana

## 2019-12-26 NOTE — REASON FOR VISIT
[FreeTextEntry1] : [facility]\par \par [facility] [phone] at [address] [place] [id number]\par

## 2020-01-06 NOTE — ADDENDUM
[FreeTextEntry1] : \par Initial visit with Clinical .  Reviewed case and records and provided counseling with genetic counselor, Idania Kendall, MS, [facility].  Patient counseling note reviewed and amended.  The patient expressed good understanding of the information provided and requested [facility]’s Common Hereditary Cancers Panel gene testing.  Signed informed consent and blood sample obtained after counseling regarding risks/benefits, limitations, and FELSI issues regarding testing.  We will follow-up pending result receipt.  The patient encounter lasted 45 minutes with >50% time counseling the patient face-to-face in office at this initial appointment with Clinical Genetics.\par \par Tariq Park DO, FACMG\par Clinical Genetics\par

## 2020-01-08 PROBLEM — Z85.42 HISTORY OF ENDOMETRIAL CANCER: Status: RESOLVED | Noted: 2018-03-05 | Resolved: 2020-01-08

## 2020-01-08 NOTE — REASON FOR VISIT
[FreeTextEntry1] : [facility]\par \par [facility] [phone] at [address] [place] [id number]\par

## 2020-01-08 NOTE — PHYSICAL EXAM
[Absent] : Adnexa(ae): Absent [Normal] : Bimanual Exam: Normal [Fully active, able to carry on all pre-disease performance without restriction] : Status 0 - Fully active, able to carry on all pre-disease performance without restriction [de-identified] : Paula Conley MA was present the entire time of gynecological exam

## 2020-01-08 NOTE — REASON FOR VISIT
[FreeTextEntry1] : [facility]\par \par [facility] [phone] at [address] [place] [address]\par

## 2020-01-08 NOTE — END OF VISIT
[FreeTextEntry3] : Written by Paula Conley, acting as a scribe for Dr. Aroldo Danielson.\par This note accurately reflects the work and decision made by me.\par

## 2020-01-08 NOTE — PHYSICAL EXAM
[Absent] : Adnexa(ae): Absent [Normal] : Bimanual Exam: Normal [Fully active, able to carry on all pre-disease performance without restriction] : Status 0 - Fully active, able to carry on all pre-disease performance without restriction [de-identified] : Paula Conley MA was present the entire time of gynecological exam

## 2020-01-08 NOTE — HISTORY OF PRESENT ILLNESS
[FreeTextEntry1] : This 62y/o with a hx of stage 1A grade 1 endometrial cancer since 03/2018 returns to the office for a three month surveillance visit. Patient feels well from a gynecological stand point. She denies pain or VB.\par Reports having a colonoscopy in 2018 prior to her hysterectomy and gets frequent colonoscopies due to family history of colon cancer and personal history of polyps. She is due for mammogram and bone density scan in April 2020, she will get scripts from Dr. Payton Danielson as she is also due for her annual visit at that time. \par  \par

## 2020-01-27 ENCOUNTER — APPOINTMENT (OUTPATIENT)
Dept: PEDIATRIC MEDICAL GENETICS | Facility: CLINIC | Age: 65
End: 2020-01-27

## 2020-01-31 ENCOUNTER — APPOINTMENT (OUTPATIENT)
Dept: PEDIATRIC MEDICAL GENETICS | Facility: CLINIC | Age: 65
End: 2020-01-31
Payer: COMMERCIAL

## 2020-01-31 PROCEDURE — 99213 OFFICE O/P EST LOW 20 MIN: CPT

## 2020-01-31 PROCEDURE — ZZZZZ: CPT

## 2020-02-13 NOTE — H&P PST ADULT - BMI (KG/M2)
February 13, 2020      Trung Dubois  [id number] [address] [place] [phone]        To Whom It May Concern:    The above patient is being treated for influenza-like illness. They should not be at work or school until they are fever-free for 24 hours without the aid of medications.  Approximate return date: Next Monday or Tuesday      Sincerely,        DAWOOD Cody CNP    
33.8

## 2020-03-01 ENCOUNTER — EMERGENCY (EMERGENCY)
Facility: HOSPITAL | Age: 65
LOS: 1 days | Discharge: HOME | End: 2020-03-01
Attending: EMERGENCY MEDICINE
Payer: COMMERCIAL

## 2020-03-01 VITALS
WEIGHT: 210.1 LBS | HEART RATE: 92 BPM | TEMPERATURE: 98 F | SYSTOLIC BLOOD PRESSURE: 119 MMHG | RESPIRATION RATE: 19 BRPM | OXYGEN SATURATION: 98 % | DIASTOLIC BLOOD PRESSURE: 76 MMHG | HEIGHT: 66 IN

## 2020-03-01 DIAGNOSIS — Z98.890 OTHER SPECIFIED POSTPROCEDURAL STATES: Chronic | ICD-10-CM

## 2020-03-01 DIAGNOSIS — Z90.49 ACQUIRED ABSENCE OF OTHER SPECIFIED PARTS OF DIGESTIVE TRACT: Chronic | ICD-10-CM

## 2020-03-01 DIAGNOSIS — Z90.710 ACQUIRED ABSENCE OF BOTH CERVIX AND UTERUS: Chronic | ICD-10-CM

## 2020-03-01 LAB
ALBUMIN SERPL ELPH-MCNC: 4.1 G/DL — SIGNIFICANT CHANGE UP (ref 3.3–5.2)
ALP SERPL-CCNC: 73 U/L — SIGNIFICANT CHANGE UP (ref 40–120)
ALT FLD-CCNC: 20 U/L — SIGNIFICANT CHANGE UP
ANION GAP SERPL CALC-SCNC: 15 MMOL/L — SIGNIFICANT CHANGE UP (ref 5–17)
AST SERPL-CCNC: 17 U/L — SIGNIFICANT CHANGE UP
BASOPHILS # BLD AUTO: 0.06 K/UL — SIGNIFICANT CHANGE UP (ref 0–0.2)
BASOPHILS NFR BLD AUTO: 0.6 % — SIGNIFICANT CHANGE UP (ref 0–2)
BILIRUB SERPL-MCNC: 0.2 MG/DL — LOW (ref 0.4–2)
BUN SERPL-MCNC: 19 MG/DL — SIGNIFICANT CHANGE UP (ref 8–20)
CALCIUM SERPL-MCNC: 10.1 MG/DL — SIGNIFICANT CHANGE UP (ref 8.6–10.2)
CHLORIDE SERPL-SCNC: 101 MMOL/L — SIGNIFICANT CHANGE UP (ref 98–107)
CK MB CFR SERPL CALC: 2.7 NG/ML — SIGNIFICANT CHANGE UP (ref 0–6.7)
CK SERPL-CCNC: 183 U/L — HIGH (ref 25–170)
CO2 SERPL-SCNC: 28 MMOL/L — SIGNIFICANT CHANGE UP (ref 22–29)
CREAT SERPL-MCNC: 0.87 MG/DL — SIGNIFICANT CHANGE UP (ref 0.5–1.3)
EOSINOPHIL # BLD AUTO: 0.27 K/UL — SIGNIFICANT CHANGE UP (ref 0–0.5)
EOSINOPHIL NFR BLD AUTO: 2.5 % — SIGNIFICANT CHANGE UP (ref 0–6)
GLUCOSE SERPL-MCNC: 102 MG/DL — HIGH (ref 70–99)
HCT VFR BLD CALC: 39 % — SIGNIFICANT CHANGE UP (ref 34.5–45)
HGB BLD-MCNC: 12.4 G/DL — SIGNIFICANT CHANGE UP (ref 11.5–15.5)
IMM GRANULOCYTES NFR BLD AUTO: 0.2 % — SIGNIFICANT CHANGE UP (ref 0–1.5)
LYMPHOCYTES # BLD AUTO: 4.31 K/UL — HIGH (ref 1–3.3)
LYMPHOCYTES # BLD AUTO: 40.4 % — SIGNIFICANT CHANGE UP (ref 13–44)
MCHC RBC-ENTMCNC: 30.2 PG — SIGNIFICANT CHANGE UP (ref 27–34)
MCHC RBC-ENTMCNC: 31.8 GM/DL — LOW (ref 32–36)
MCV RBC AUTO: 94.9 FL — SIGNIFICANT CHANGE UP (ref 80–100)
MONOCYTES # BLD AUTO: 0.73 K/UL — SIGNIFICANT CHANGE UP (ref 0–0.9)
MONOCYTES NFR BLD AUTO: 6.8 % — SIGNIFICANT CHANGE UP (ref 2–14)
NEUTROPHILS # BLD AUTO: 5.27 K/UL — SIGNIFICANT CHANGE UP (ref 1.8–7.4)
NEUTROPHILS NFR BLD AUTO: 49.5 % — SIGNIFICANT CHANGE UP (ref 43–77)
PLATELET # BLD AUTO: 335 K/UL — SIGNIFICANT CHANGE UP (ref 150–400)
POTASSIUM SERPL-MCNC: 3.4 MMOL/L — LOW (ref 3.5–5.3)
POTASSIUM SERPL-SCNC: 3.4 MMOL/L — LOW (ref 3.5–5.3)
PROT SERPL-MCNC: 7.1 G/DL — SIGNIFICANT CHANGE UP (ref 6.6–8.7)
RBC # BLD: 4.11 M/UL — SIGNIFICANT CHANGE UP (ref 3.8–5.2)
RBC # FLD: 13.3 % — SIGNIFICANT CHANGE UP (ref 10.3–14.5)
SODIUM SERPL-SCNC: 144 MMOL/L — SIGNIFICANT CHANGE UP (ref 135–145)
TROPONIN T SERPL-MCNC: <0.01 NG/ML — SIGNIFICANT CHANGE UP (ref 0–0.06)
TROPONIN T SERPL-MCNC: <0.01 NG/ML — SIGNIFICANT CHANGE UP (ref 0–0.06)
WBC # BLD: 10.66 K/UL — HIGH (ref 3.8–10.5)
WBC # FLD AUTO: 10.66 K/UL — HIGH (ref 3.8–10.5)

## 2020-03-01 PROCEDURE — 93010 ELECTROCARDIOGRAM REPORT: CPT

## 2020-03-01 PROCEDURE — 99220: CPT

## 2020-03-01 PROCEDURE — 71046 X-RAY EXAM CHEST 2 VIEWS: CPT | Mod: 26

## 2020-03-01 RX ORDER — ASPIRIN/CALCIUM CARB/MAGNESIUM 324 MG
325 TABLET ORAL ONCE
Refills: 0 | Status: COMPLETED | OUTPATIENT
Start: 2020-03-01 | End: 2020-03-01

## 2020-03-01 RX ADMIN — Medication 325 MILLIGRAM(S): at 20:07

## 2020-03-01 NOTE — ED CDU PROVIDER INITIAL DAY NOTE - OBJECTIVE STATEMENT
64 yo female PMHx of HTN, GERD, endometrial CA s/p CHRISTAL presents to the ED c/o intermittent sharp/shooting sternal CP radiating to L chest that began 3 hours ago at rest, non exertional. Denies SOB or nausea during episodes of pain. However, states that she is currently experiencing dull pain now, has not taken any Aspirin. Pt also reports radiation into L arm, describes as heaviness, that began 2 hours ago. Denies having any recent complications with HTN. Pt notes that she hast not seen her cardiologist for the past few years for CP to the same area, had negative cardiac workup, and was Dx with a pulled muscle; pt does not remember if pain is similar. Also notes significant cardiac FHx to father with MI at 70 y/o. Denies any lower leg edema or dyspnea. Denies recent surgery. Pt is a former smoker, notes only having smoked for a year at 19 y/o.   PMD: Dr. Davis  Cardiologist: Dr. Posada

## 2020-03-01 NOTE — ED CDU PROVIDER INITIAL DAY NOTE - FAMILY HISTORY
FH: colon cancer     Father  Still living? No  FH: hypertension, Age at diagnosis: Age Unknown  FH: diabetes mellitus, Age at diagnosis: Age Unknown  FH: myocardial infarction, Age at diagnosis: Age Unknown  Family history of aneurysm, Age at diagnosis: Age Unknown

## 2020-03-01 NOTE — ED ADULT TRIAGE NOTE - CHIEF COMPLAINT QUOTE
Pt. complaining of intermittent sharp left pectoral chest with accompanied left arm numbness and mid back pain that began today.  Pt. states chest pain began at approximately 4pm and left arm numbness approx 5pm.  Pt. with history of HTN and endometrial CA.

## 2020-03-01 NOTE — ED PROVIDER NOTE - CLINICAL SUMMARY MEDICAL DECISION MAKING FREE TEXT BOX
66 y/o F, with Hx of HTN and family cardiac Hx, having atypical CP that started at rest, no exertional component, EKG non ischemic. Will cycle troponin, discuss with cardiology, and reassess.

## 2020-03-01 NOTE — ED PROVIDER NOTE - OBJECTIVE STATEMENT
66 y/o F pt, with PMHx of HTN (on Losartan and Hydrochlorothiazide) and GERD, presents to the ED c/o intermittent sharp/shooting sternal CP radiating to L chest that began 3 hours ago at rest. Denies SOB or nausea during episodes of pain. However, states that she is currently experiencing pain now, has not taken any Aspirin. Pt also reports radiation into L arm, describes as heaviness, that began 2 hours ago. Denies having any recent complications with HTN. Pt notes that she hast not seen her cardiologist for the past few years for CP to the same area, had negative cardiac workup, and was Dx with a pulled muscle; pt does not remember if pain is similar. Also notes significant cardiac FHx to father with MI at 70 y/o. Denies any lower leg edema or dyspnea. PMD: Dr. Davis. Cardiologist: Dr. Dawson. Pt is a former smoker, notes only having smoked for a year at 19 y/o. Pt is allergic to Sulfa causing rash.

## 2020-03-01 NOTE — ED PROVIDER NOTE - NS ED ROS FT
Const: Denies fever, chills  HEENT: Denies blurry vision, sore throat  Neck: Denies neck pain/stiffness  Resp: Denies coughing, SOB, exertional dyspnea.  Cardiovascular: (+) CP. Denies palpitations, LE edema  GI: Denies nausea, vomiting, abdominal pain, diarrhea, constipation, blood in stool  : Denies urinary frequency/urgency/dysuria, hematuria  MSK: Denies back pain  Neuro: Denies HA, dizziness, numbness, weakness  Skin: Denies rashes.

## 2020-03-01 NOTE — ED PROVIDER NOTE - PSH
S/P arthroscopy of shoulder    S/P cholecystectomy    S/P dilatation and curettage    S/P right knee arthroscopy    S/P tendon repair  R hand  S/P total hysterectomy

## 2020-03-01 NOTE — ED ADULT NURSE NOTE - CHPI ED NUR SYMPTOMS NEG
no shortness of breath/no vomiting/no diaphoresis/no congestion/no chills/no nausea/no fever/no syncope/no dizziness

## 2020-03-01 NOTE — ED ADULT NURSE NOTE - NSIMPLEMENTINTERV_GEN_ALL_ED
Implemented All Universal Safety Interventions:  [place] to call system. Call bell, personal items and telephone within reach. Instruct patient to call for assistance. Room bathroom lighting operational. Non-slip footwear when patient is off stretcher. Physically safe environment: no spills, clutter or unnecessary equipment. Stretcher in lowest position, wheels locked, appropriate side rails in place.

## 2020-03-01 NOTE — ED CDU PROVIDER INITIAL DAY NOTE - ATTENDING CONTRIBUTION TO CARE
I, Sanam Ramirez, participated in the care of this patient with the PA. I discussed the history and physical exam findings as well as lab results and plan of care with the PA. I agree with PA's history, physical and assessment.

## 2020-03-01 NOTE — ED PROVIDER NOTE - PROGRESS NOTE DETAILS
Spoke with Dr. Toth, who recommends placing pt in observation for serial trop/serial enzymes and will be seen in the morning. Spoke with Dr. Pineda, who recommends placing pt in observation for serial trop/serial enzymes and will be seen in the morning. I shared results with patient, she is amendable to staying in observation for serial trop/EKGs and cardiology to see in the morning, still having some left sided chest pain.

## 2020-03-01 NOTE — ED ADULT NURSE NOTE - CAS ELECT INFOMATION PROVIDED
DC instructions/Patient in understanding of discharge instructions. VSS. ambulatory. In stable condition.

## 2020-03-01 NOTE — ED PROVIDER NOTE - PMH
Acute pain  left shoulder  Asthma    Endometrial cancer    GERD (gastroesophageal reflux disease)    Hypertension    IBS (irritable bowel syndrome)    Overactive bladder    Risk factors for obstructive sleep apnea    Uterine polyp

## 2020-03-01 NOTE — ED CDU PROVIDER INITIAL DAY NOTE - MEDICAL DECISION MAKING DETAILS
66 yo female PMHx of HTN, GERD, endometrial CA s/p CHRISTAL presents to the ED c/o intermittent sharp/shooting sternal CP radiating to L chest that began 3 hours ago at rest. Initial troponin negative. VSS, SB cardiology consulted. Patient placed in CDU for serial troponin and cardiology consult. Further management plan pending results and consult recommendations.

## 2020-03-01 NOTE — ED ADULT NURSE NOTE - OBJECTIVE STATEMENT
Pt c/o intermittent L pectoral CP, mid back pain, that began around 4 PM and L arm numbness that began around 530PM. Pt states pain began at rest. Pt also states on Thursday while knitting and became very lightheaded which lasted for about 10 mins. Pt denies any syncope, HA, N/V/D, SOB, difficulty berathing. Pt has pmhx HTN, endometrial ca, asthma.

## 2020-03-02 VITALS
OXYGEN SATURATION: 98 % | RESPIRATION RATE: 18 BRPM | SYSTOLIC BLOOD PRESSURE: 104 MMHG | HEART RATE: 102 BPM | TEMPERATURE: 98 F | DIASTOLIC BLOOD PRESSURE: 71 MMHG

## 2020-03-02 LAB
D DIMER BLD IA.RAPID-MCNC: <150 NG/ML DDU — SIGNIFICANT CHANGE UP
NT-PROBNP SERPL-SCNC: 43 PG/ML — SIGNIFICANT CHANGE UP (ref 0–300)
TROPONIN T SERPL-MCNC: <0.01 NG/ML — SIGNIFICANT CHANGE UP (ref 0–0.06)

## 2020-03-02 PROCEDURE — 85027 COMPLETE CBC AUTOMATED: CPT

## 2020-03-02 PROCEDURE — 71046 X-RAY EXAM CHEST 2 VIEWS: CPT

## 2020-03-02 PROCEDURE — 93010 ELECTROCARDIOGRAM REPORT: CPT

## 2020-03-02 PROCEDURE — G0378: CPT

## 2020-03-02 PROCEDURE — 93005 ELECTROCARDIOGRAM TRACING: CPT

## 2020-03-02 PROCEDURE — 99284 EMERGENCY DEPT VISIT MOD MDM: CPT | Mod: 25

## 2020-03-02 PROCEDURE — 84484 ASSAY OF TROPONIN QUANT: CPT

## 2020-03-02 PROCEDURE — 36415 COLL VENOUS BLD VENIPUNCTURE: CPT

## 2020-03-02 PROCEDURE — 99217: CPT

## 2020-03-02 PROCEDURE — 80053 COMPREHEN METABOLIC PANEL: CPT

## 2020-03-02 PROCEDURE — 82550 ASSAY OF CK (CPK): CPT

## 2020-03-02 PROCEDURE — 82553 CREATINE MB FRACTION: CPT

## 2020-03-02 PROCEDURE — 85379 FIBRIN DEGRADATION QUANT: CPT

## 2020-03-02 PROCEDURE — 83880 ASSAY OF NATRIURETIC PEPTIDE: CPT

## 2020-03-02 RX ORDER — LOSARTAN POTASSIUM 100 MG/1
100 TABLET, FILM COATED ORAL DAILY
Refills: 0 | Status: DISCONTINUED | OUTPATIENT
Start: 2020-03-01 | End: 2020-03-07

## 2020-03-02 RX ORDER — POTASSIUM CHLORIDE 20 MEQ
40 PACKET (EA) ORAL ONCE
Refills: 0 | Status: COMPLETED | OUTPATIENT
Start: 2020-03-02 | End: 2020-03-02

## 2020-03-02 RX ORDER — MONTELUKAST 4 MG/1
10 TABLET, CHEWABLE ORAL DAILY
Refills: 0 | Status: DISCONTINUED | OUTPATIENT
Start: 2020-03-01 | End: 2020-03-07

## 2020-03-02 RX ORDER — OXYBUTYNIN CHLORIDE 5 MG
15 TABLET ORAL
Refills: 0 | Status: DISCONTINUED | OUTPATIENT
Start: 2020-03-01 | End: 2020-03-07

## 2020-03-02 RX ORDER — POTASSIUM CHLORIDE 20 MEQ
40 PACKET (EA) ORAL ONCE
Refills: 0 | Status: DISCONTINUED | OUTPATIENT
Start: 2020-03-02 | End: 2020-03-02

## 2020-03-02 RX ORDER — HYDROCHLOROTHIAZIDE 25 MG
25 TABLET ORAL DAILY
Refills: 0 | Status: DISCONTINUED | OUTPATIENT
Start: 2020-03-01 | End: 2020-03-07

## 2020-03-02 RX ADMIN — Medication 40 MILLIEQUIVALENT(S): at 09:57

## 2020-03-02 RX ADMIN — LOSARTAN POTASSIUM 100 MILLIGRAM(S): 100 TABLET, FILM COATED ORAL at 05:58

## 2020-03-02 RX ADMIN — Medication 25 MILLIGRAM(S): at 05:58

## 2020-03-02 NOTE — ED CDU PROVIDER SUBSEQUENT DAY NOTE - ATTENDING CONTRIBUTION TO CARE
seen with acp  pt pain changed last night now from axilla across to central chest on left  all labs neg including ddimer and trops, bnp  to be seen by cardio this am - [facility]  agree with care plan

## 2020-03-02 NOTE — ED CDU PROVIDER DISPOSITION NOTE - NSFOLLOWUPINSTRUCTIONS_ED_ALL_ED_FT
1) CONTINUE ALL OF YOUR HOME MEDICATIONS AS DIRECTED  2) CALL TO MAKE AN APPOINTMENT WITH DR GARCIA FOR FURTHER OUTPATIENT WORK UP.

## 2020-03-02 NOTE — ED CDU PROVIDER DISPOSITION NOTE - CARE PROVIDER_API CALL
Karlos Al (MD)  Cardiovascular Disease; Internal Medicine  [address] [place] [address]  Phone: [phone]  Fax: [phone]  Follow Up Time: 4-6 Days

## 2020-03-02 NOTE — CONSULT NOTE ADULT - SUBJECTIVE AND OBJECTIVE BOX
Chief Complaint: 66 yo Female with cp.    HPI: 66 yo F was knitting yesterday when she began to experience sharp retrosternal and anterior pain-fleeting but recurrent over several hours. Her left shoulder felt numb. She has no cardiac hx ie MI/exertional cp arrhythmia. PMH+ for hpt/right knee replacement/hysterectomy for Ca/shoulder surgery-left. Denies dm/cva/syncope/seizure/renal/hepatic/heme or endocrine disease. Hx of mild asthma. Nonsmoker/etoh. Her cp may have been accentuated by movement of chest. ROS neg for kaur edema orthopnea or pnd. Pt is active and working. Family hx father needed LAD stent and lived into 90's. O/P meds: 325 asa/hyzaar 100/25/prilosec/inhalers singulair.    PAST MEDICAL & SURGICAL HISTORY:  IBS (irritable bowel syndrome)  Risk factors for obstructive sleep apnea  Endometrial cancer  Uterine polyp  Acute pain: left shoulder  Overactive bladder  GERD (gastroesophageal reflux disease)  Asthma  Hypertension  S/P total hysterectomy  S/P tendon repair: R hand  S/P dilatation and curettage  S/P cholecystectomy  S/P arthroscopy of shoulder  S/P right knee arthroscopy      PREVIOUS DIAGNOSTIC TESTING:      ECHO  FINDINGS:    STRESS  FINDINGS:    CATHETERIZATION  FINDINGS:    MEDICATIONS  (STANDING):  dicyclomine 10 milliGRAM(s) Oral daily  hydrochlorothiazide 25 milliGRAM(s) Oral daily  losartan 100 milliGRAM(s) Oral daily  montelukast 10 milliGRAM(s) Oral daily  oxybutynin 15 milliGRAM(s) Oral two times a day  potassium chloride    Tablet ER 40 milliEquivalent(s) Oral once    MEDICATIONS  (PRN):      FAMILY HISTORY:  Family history of aneurysm (Father)  FH: myocardial infarction (Father)  FH: diabetes mellitus (Father)  FH: colon cancer  FH: hypertension (Father)      SOCIAL HISTORY:     CIGARETTES: 0    ALCOHOL: 0    ROS: Negative other than as mentioned in HPI.    Vital Signs Last 24 Hrs  T(C): 36.8 (02 Mar 2020 08:01), Max: 36.8 (01 Mar 2020 19:26)  T(F): 98.3 (02 Mar 2020 08:01), Max: 98.3 (02 Mar 2020 08:01)  HR: 70 reg (02 Mar 2020 08:01) (92 - 104)  BP: 130/70 RA manually (02 Mar 2020 08:01) (113/72 - 119/76)  BP(mean): --  RR: 14 ora (02 Mar 2020 08:01) (18 - 19)  SpO2: 97% (02 Mar 2020 08:01) (92% - 98%)    PHYSICAL EXAM:  General: Appears well developed, well nourished alert and cooperative. afebrile ma F nad.  HEENT: Head; normocephalic, atraumatic.  Eyes;   Pupils reactive, cornea wnl.  Neck; Supple, no nodes adenopathy, no NVD or carotid bruit or thyromegaly.  CARDIOVASCULAR; No murmur, rub, gallop or lift. Normal S1 and S2. + CWT to palpation duplicating her presenting sx's.  LUNGS; No rales, rhonchi or wheeze. Normal breath sounds bilaterally.  ABDOMEN ; Soft, nontender without mass or organomegaly. bowel sounds normoactive.  EXTREMITIES; No clubbing, cyanosis or edema. Distal pulses wnl. ROM normal.  SKIN; warm and dry with normal turgor.  NEURO; Alert/oriented x 3/normal motor exam. No pathologic reflexes.    PSYCH; normal affect.            INTERPRETATION OF TELEMETRY:    ECG: SR prwp-reviewed.  CXR reviewed: wnl  I&O's Detail      LABS:                        12.4   10.66 )-----------( 335      ( 01 Mar 2020 20:07 )             39.0     03-01    144  |  101  |  19.0  ----------------------------<  102<H>  3.4<L>   |  28.0  |  0.87    Ca    10.1      01 Mar 2020 20:07    TPro  7.1  /  Alb  4.1  /  TBili  0.2<L>  /  DBili  x   /  AST  17  /  ALT  20  /  AlkPhos  73  03-01    CARDIAC MARKERS ( 02 Mar 2020 02:55 )  x     / <0.01 ng/mL / x     / x     / x      CARDIAC MARKERS ( 01 Mar 2020 23:16 )  x     / <0.01 ng/mL / x     / x     / x      CARDIAC MARKERS ( 01 Mar 2020 20:07 )  x     / <0.01 ng/mL / 183 U/L / x     / 2.7 ng/mL          I&O's Summary      RADIOLOGY & ADDITIONAL STUDIES:

## 2020-03-02 NOTE — ED CDU PROVIDER SUBSEQUENT DAY NOTE - MEDICAL DECISION MAKING DETAILS
64 yo female PMHx of HTN, GERD, endometrial CA s/p CHRISTAL presents to the ED c/o intermittent sharp/shooting sternal CP radiating to L chest that began 3 hours ago at rest. Troponin x3 negative. Pending cardiology consult and recommendations.

## 2020-03-02 NOTE — ED CDU PROVIDER DISPOSITION NOTE - PATIENT PORTAL LINK FT
You can access the FollowMyHealth Patient Portal offered by [facility] by registering at the following website: [email or web address]. By joining [facility]’s FollowMyHealth portal, you will also be able to view your health information using other applications (apps) compatible with our system.

## 2020-03-02 NOTE — ED ADULT NURSE REASSESSMENT NOTE - COMFORT CARE
ambulated to bathroom/side rails up/wait time explained/darkened lights/plan of care explained/repositioned

## 2020-03-02 NOTE — ED ADULT NURSE REASSESSMENT NOTE - NS ED NURSE REASSESS COMMENT FT1
Report given to DEMIAN Stover. Pt POC explained and verbalized understanding. Pt placed on OBS. Pt transferred to cdu for further monitoring.
Assumed care of the patient at 0730. Patient A&Ox4. No s/s of distress. States left sided CP radiating down to left shoulder persists but improving since arrival to ED. Denies SOB/Dizziness or palpitations. Sinus tachy on CM. VSS. ambulatory, steady. PIV patent. Patient pending Cardiology consult. Patient remains NPO pending cardiology consult. Patient in understanding of plan of care. Patient with no further questions for the RN. Call bell within reach and encouraged to use when assistance needed. Will continue to monitor.

## 2020-03-02 NOTE — ED CDU PROVIDER DISPOSITION NOTE - CLINICAL COURSE
66 yo female PMHx of HTN, GERD, endometrial CA s/p CHRISTAL presents to the ED c/o intermittent sharp/shooting sternal CP radiating to L chest that began 3 hours PTA at rest, non exertional. Denies SOB or nausea during episodes of pain. However, states that she hads dull pain on arrival in ED, has not taken any Aspirin. Pt also reports radiation into L arm, describes as heaviness, that began 2 hours PTA. Denies having any recent complications with HTN. Pt notes that she hast not seen her cardiologist for the past few years for CP to the same area, had negative cardiac workup, and was Dx with a pulled muscle; pt does not remember if pain is similar. Also notes significant cardiac FHx to father with MI at 70 y/o. Denies any lower leg edema or dyspnea. Denies recent surgery. Pt is a former smoker, notes only having smoked for a year at 21 y/o.  Pain has resolved, Troponin negative x 3, seen by cardiology recommend d/c home and f/u outpatient for workup.  PMD: Dr. Davis  Cardiologist: Dr. Dawson

## 2020-03-02 NOTE — CONSULT NOTE ADULT - ASSESSMENT
1. 64 yo F presented with several hrs of sharp chest pain-ekg and troponins are neg. No evidence of AMI or ACS. Most likely musculo-skeletal discomfort. Will arrange o/p stress test. Can dc home on her current meds.  2. controlled hpt  3. hx of uterine cancer-treated  4. family hx of cad.

## 2020-03-03 ENCOUNTER — RESULT REVIEW (OUTPATIENT)
Age: 65
End: 2020-03-03

## 2020-03-16 NOTE — ED ADULT NURSE NOTE - PMH
Acute pain  left shoulder  Asthma    Endometrial cancer    GERD (gastroesophageal reflux disease)    Hypertension    IBS (irritable bowel syndrome)    Overactive bladder    Risk factors for obstructive sleep apnea    Uterine polyp
no iv

## 2020-04-02 ENCOUNTER — FORM ENCOUNTER (OUTPATIENT)
Age: 65
End: 2020-04-02

## 2020-04-06 ENCOUNTER — APPOINTMENT (OUTPATIENT)
Dept: GYNECOLOGIC ONCOLOGY | Facility: CLINIC | Age: 65
End: 2020-04-06

## 2020-04-20 ENCOUNTER — APPOINTMENT (OUTPATIENT)
Dept: GYNECOLOGIC ONCOLOGY | Facility: HOSPITAL | Age: 65
End: 2020-04-20

## 2020-05-14 ENCOUNTER — LABORATORY RESULT (OUTPATIENT)
Age: 65
End: 2020-05-14

## 2020-05-14 ENCOUNTER — APPOINTMENT (OUTPATIENT)
Dept: RHEUMATOLOGY | Facility: CLINIC | Age: 65
End: 2020-05-14
Payer: COMMERCIAL

## 2020-05-14 VITALS
SYSTOLIC BLOOD PRESSURE: 110 MMHG | HEART RATE: 100 BPM | BODY MASS INDEX: 33.75 KG/M2 | WEIGHT: 210 LBS | OXYGEN SATURATION: 98 % | HEIGHT: 66 IN | DIASTOLIC BLOOD PRESSURE: 70 MMHG | TEMPERATURE: 98.6 F

## 2020-05-14 DIAGNOSIS — M25.562 PAIN IN LEFT KNEE: ICD-10-CM

## 2020-05-14 DIAGNOSIS — Z80.6 FAMILY HISTORY OF LEUKEMIA: ICD-10-CM

## 2020-05-14 DIAGNOSIS — R20.0 ANESTHESIA OF SKIN: ICD-10-CM

## 2020-05-14 DIAGNOSIS — R20.2 ANESTHESIA OF SKIN: ICD-10-CM

## 2020-05-14 DIAGNOSIS — M54.5 LOW BACK PAIN: ICD-10-CM

## 2020-05-14 PROCEDURE — 36415 COLL VENOUS BLD VENIPUNCTURE: CPT

## 2020-05-14 PROCEDURE — 99205 OFFICE O/P NEW HI 60 MIN: CPT | Mod: 25

## 2020-05-14 NOTE — PHYSICAL EXAM
[General Appearance - Alert] : alert [Sclera] : the sclera and conjunctiva were normal [General Appearance - Well Nourished] : well nourished [Outer Ear] : the ears and nose were normal in appearance [Extraocular Movements] : extraocular movements were intact [Nasal Cavity] : the nasal mucosa and septum were normal [Neck Appearance] : the appearance of the neck was normal [Respiration, Rhythm And Depth] : normal respiratory rhythm and effort [Auscultation Breath Sounds / Voice Sounds] : lungs were clear to auscultation bilaterally [Heart Rate And Rhythm] : heart rate was normal and rhythm regular [Bowel Sounds] : normal bowel sounds [Heart Sounds] : normal S1 and S2 [Supraclavicular Lymph Nodes Enlarged Bilaterally] : supraclavicular [Abdomen Tenderness] : non-tender [Abdomen Soft] : soft [Cervical Lymph Nodes Enlarged Anterior Bilaterally] : anterior cervical [No Spinal Tenderness] : no spinal tenderness [No CVA Tenderness] : no ~M costovertebral angle tenderness [Motor Tone] : muscle strength and tone were normal [Cranial Nerves] : cranial nerves 2-12 were intact [] : no rash [No Focal Deficits] : no focal deficits [Impaired Insight] : insight and judgment were intact [Mood] : the mood was normal [FreeTextEntry1] : no synovitis or effusion on exam noted today

## 2020-05-14 NOTE — ASSESSMENT
[FreeTextEntry1] : 65-year-old female, here for the first time w/ OA knee w/ +ALE >1:1280 speckled w/ Allergist with mainly reports of some raynaud's symptoms, dry mouth with it today. States she notes some intermittent Lt knee pain and LBP likely secondary to OA/ DDD. \par -reviewed labs from [facility] 5/1/2020 w/ RF normal; +ALE >1:1280 speckled; low TSH w/ NL T4 (seeing PCP for it  tomorrow)\par -No synovitis or effusion on exam noted today and advised to monitor.\par -labs as below incld ESR, CRP, serologies to be complete\par \par +ALE >1:1280: mainly reports of some raynaud's symptoms, dry mouth at times\par -Raynaud's: no ulcers and reports of color changes to red and purple in the hands & ears w/ the cold. Discussed to keep warm and if not controlled can add Ca channel blocker if BP allows\par -Clinically without much other symptoms of CTD/lupus at this time and educated on symptoms to monitor for in detail if she evolves.\par -lab as below w/ disease activity markers as below to be complete\par -will check thyroid abs as discussed +ALE can be seen with cross reactivity\par \par hx of oral ulcers: once in a while & tend to be painful/ ?aphthous ulcers\par -no ulcers today and will monitor \par -no genital or nasal ulcers at all\par \par Intermittent numbness/tingling: intermittent in hands. Advised to monitor for CTS symptoms in the hands.\par -check metabolic labs incld folate, B12, TSH, HbA1C for it.\par -if persistent can consider EMG w/ Neuro\par \par Knee pain -Referred for xray of b/l knees to evaluate for structural changes, OA\par -consider steroid injections \par \par LBP: intermittent \par -Referred for xray of LS spine to evaluate for structural changes, DDD, confirm SI normal \par -Advil PRN w/ food needed sparingly helps\par \par Bone health: postmenopausal patient reports Dexa >2yrs ago w/ Dexa referral given today to evaluate for osteopenia/osteoporosis.\par \par HTN: ZE=856/70, asymptomatic on lisinopril-HCTZ and monitors w/ PCP \par \par -educated on symptoms to monitor for in detail and alert us if any concerns.\par -knows to stay up to date on health maintenance w/ PCP\par -f/u in 10-14days w/ labs, xrays, Dexa please\par

## 2020-05-14 NOTE — REVIEW OF SYSTEMS
[As Noted in HPI] : as noted in HPI [Fever] : no fever [Chills] : no chills [Eye Pain] : no eye pain [Earache] : no earache [Red Eyes] : eyes not red [Nosebleeds] : no nosebleeds [Shortness Of Breath] : no shortness of breath [Chest Pain] : no chest pain [Abdominal Pain] : no abdominal pain [Vomiting] : no vomiting [Dysuria] : no dysuria [Confused] : no confusion [Convulsions] : no convulsions [Suicidal] : not suicidal [Proptosis] : no proptosis [Muscle Weakness] : no muscle weakness [Easy Bleeding] : no tendency for easy bleeding

## 2020-05-14 NOTE — HISTORY OF PRESENT ILLNESS
[FreeTextEntry1] : 64 y/o F here for the first time w/ +ALE >1:1280 speckled w/ Allergist and referred here. States she had some swelling of her face and saw the Allergist and told all testing normal except her TSH was low w/ Nl free T4 and high +ALE.\par She states she was noting her ears turn bright red in the cold. States she notes her fingertips turn white in the cold.\par States she has hx of trigger finger repair in the past and no pain in the joints per se. States she can notes some numbness/tingling in her Rt>Lt hand and will monitor if it spares the 5th digit as seen in CTS. \par Denies any swelling or redness or warmth of any joints.\par Denies any low grade fevers.\par Denies any malar rash or photosensitivity.\par States she has hx of painful oral ulcers/ ?aphthous once in a while and not now. \par Denies any nasal or genital ulcers.  \par Denies any dry eyes.\par Notes dry mouth at times.\par Denies any infectious diarrhea or  symptoms at this time.\par Denies any hx of blood clots, no stroke; 1 healthy pregnancy without complications; no miscarriages. \par \par States she is postmenopausal with no Dexa in over 2 years and would be interested in knowing if she needs calcium. \par

## 2020-05-18 LAB
25(OH)D3 SERPL-MCNC: 8.8 NG/ML
ALBUMIN SERPL ELPH-MCNC: 4.5 G/DL
ALP BLD-CCNC: 66 U/L
ALT SERPL-CCNC: 14 U/L
ANION GAP SERPL CALC-SCNC: 16 MMOL/L
AST SERPL-CCNC: 16 U/L
BASOPHILS # BLD AUTO: 0.04 K/UL
BASOPHILS NFR BLD AUTO: 0.2 %
BILIRUB SERPL-MCNC: 0.2 MG/DL
BUN SERPL-MCNC: 22 MG/DL
C3 SERPL-MCNC: 151 MG/DL
C4 SERPL-MCNC: 26 MG/DL
CALCIUM SERPL-MCNC: 10.2 MG/DL
CCP AB SER IA-ACNC: <8 UNITS
CHLORIDE SERPL-SCNC: 102 MMOL/L
CK SERPL-CCNC: 99 U/L
CO2 SERPL-SCNC: 23 MMOL/L
CREAT SERPL-MCNC: 0.87 MG/DL
CREAT SPEC-SCNC: 258 MG/DL
CREAT/PROT UR: 0.1 RATIO
CRP SERPL-MCNC: 0.12 MG/DL
DSDNA AB SER-ACNC: <12 IU/ML
ENA RNP AB SER IA-ACNC: <0.2 AL
ENA SCL70 IGG SER IA-ACNC: <0.2 AL
ENA SM AB SER IA-ACNC: <0.2 AL
ENA SS-A AB SER IA-ACNC: <0.2 AL
ENA SS-B AB SER IA-ACNC: <0.2 AL
EOSINOPHIL # BLD AUTO: 0.01 K/UL
EOSINOPHIL NFR BLD AUTO: 0.1 %
ERYTHROCYTE [SEDIMENTATION RATE] IN BLOOD BY WESTERGREN METHOD: 70 MM/HR
FOLATE SERPL-MCNC: 6 NG/ML
G6PD SER-CCNC: 18.6 U/G HGB
GLUCOSE SERPL-MCNC: 90 MG/DL
HCT VFR BLD CALC: 39.6 %
HGB BLD-MCNC: 11.6 G/DL
HLA-B27 RELATED AG QL: NORMAL
HSV 1+2 IGG SER IA-IMP: POSITIVE
HSV1 IGG SER QL: >62.2 INDEX
IMM GRANULOCYTES NFR BLD AUTO: 0.4 %
LYMPHOCYTES # BLD AUTO: 3.69 K/UL
LYMPHOCYTES NFR BLD AUTO: 23 %
MAN DIFF?: NORMAL
MCHC RBC-ENTMCNC: 28 PG
MCHC RBC-ENTMCNC: 29.3 GM/DL
MCV RBC AUTO: 95.7 FL
MONOCYTES # BLD AUTO: 0.86 K/UL
MONOCYTES NFR BLD AUTO: 5.4 %
NEUTROPHILS # BLD AUTO: 11.41 K/UL
NEUTROPHILS NFR BLD AUTO: 70.9 %
PLATELET # BLD AUTO: 453 K/UL
POTASSIUM SERPL-SCNC: 4.5 MMOL/L
PROT SERPL-MCNC: 7.4 G/DL
PROT UR-MCNC: 16 MG/DL
RBC # BLD: 4.14 M/UL
RBC # FLD: 13.3 %
RF+CCP IGG SER-IMP: NEGATIVE
SODIUM SERPL-SCNC: 141 MMOL/L
THYROGLOB AB SERPL-ACNC: <20 IU/ML
THYROPEROXIDASE AB SERPL IA-ACNC: 30.3 IU/ML
TSH SERPL-ACNC: 0.06 UIU/ML
VIT B12 SERPL-MCNC: 444 PG/ML
WBC # FLD AUTO: 16.07 K/UL

## 2020-05-19 ENCOUNTER — APPOINTMENT (OUTPATIENT)
Dept: RADIOLOGY | Facility: CLINIC | Age: 65
End: 2020-05-19
Payer: COMMERCIAL

## 2020-05-19 ENCOUNTER — RESULT REVIEW (OUTPATIENT)
Age: 65
End: 2020-05-19

## 2020-05-19 ENCOUNTER — TRANSCRIPTION ENCOUNTER (OUTPATIENT)
Age: 65
End: 2020-05-19

## 2020-05-19 ENCOUNTER — OUTPATIENT (OUTPATIENT)
Dept: OUTPATIENT SERVICES | Facility: HOSPITAL | Age: 65
LOS: 1 days | End: 2020-05-19
Payer: COMMERCIAL

## 2020-05-19 DIAGNOSIS — Z00.00 ENCOUNTER FOR GENERAL ADULT MEDICAL EXAMINATION WITHOUT ABNORMAL FINDINGS: ICD-10-CM

## 2020-05-19 DIAGNOSIS — Z98.890 OTHER SPECIFIED POSTPROCEDURAL STATES: Chronic | ICD-10-CM

## 2020-05-19 DIAGNOSIS — Z78.0 ASYMPTOMATIC MENOPAUSAL STATE: ICD-10-CM

## 2020-05-19 DIAGNOSIS — M54.5 LOW BACK PAIN: ICD-10-CM

## 2020-05-19 DIAGNOSIS — Z90.49 ACQUIRED ABSENCE OF OTHER SPECIFIED PARTS OF DIGESTIVE TRACT: Chronic | ICD-10-CM

## 2020-05-19 DIAGNOSIS — M25.562 PAIN IN LEFT KNEE: ICD-10-CM

## 2020-05-19 DIAGNOSIS — Z90.710 ACQUIRED ABSENCE OF BOTH CERVIX AND UTERUS: Chronic | ICD-10-CM

## 2020-05-19 PROCEDURE — 72110 X-RAY EXAM L-2 SPINE 4/>VWS: CPT | Mod: 26

## 2020-05-19 PROCEDURE — 73564 X-RAY EXAM KNEE 4 OR MORE: CPT | Mod: 26,LT

## 2020-05-19 PROCEDURE — 73564 X-RAY EXAM KNEE 4 OR MORE: CPT

## 2020-05-19 PROCEDURE — 77080 DXA BONE DENSITY AXIAL: CPT | Mod: 26

## 2020-05-19 PROCEDURE — 72110 X-RAY EXAM L-2 SPINE 4/>VWS: CPT

## 2020-05-19 PROCEDURE — 77080 DXA BONE DENSITY AXIAL: CPT

## 2020-05-20 LAB
ALBUMIN MFR SERPL ELPH: 55.8 %
ALBUMIN SERPL-MCNC: 3.9 G/DL
ALBUMIN/GLOB SERPL: 1.3 RATIO
ALPHA1 GLOB MFR SERPL ELPH: 4.6 %
ALPHA1 GLOB SERPL ELPH-MCNC: 0.3 G/DL
ALPHA2 GLOB MFR SERPL ELPH: 12.3 %
ALPHA2 GLOB SERPL ELPH-MCNC: 0.9 G/DL
ANA PAT FLD IF-IMP: ABNORMAL
ANA PATTERN: ABNORMAL
ANA SER IF-ACNC: ABNORMAL
ANA TITER: ABNORMAL
B-GLOBULIN MFR SERPL ELPH: 13.2 %
B-GLOBULIN SERPL ELPH-MCNC: 0.9 G/DL
BASOPHILS # BLD AUTO: 0.06 K/UL
BASOPHILS NFR BLD AUTO: 0.4 %
DEPRECATED KAPPA LC FREE/LAMBDA SER: 1.43 RATIO
EOSINOPHIL # BLD AUTO: 0.04 K/UL
EOSINOPHIL NFR BLD AUTO: 0.3 %
ERYTHROCYTE [SEDIMENTATION RATE] IN BLOOD BY WESTERGREN METHOD: 35 MM/HR
GAMMA GLOB FLD ELPH-MCNC: 1 G/DL
GAMMA GLOB MFR SERPL ELPH: 14.1 %
HCT VFR BLD CALC: 40.3 %
HGB BLD-MCNC: 12 G/DL
HISTONE AB SER QL: 0.7 UNITS
IGA SER QL IEP: 181 MG/DL
IGG SER QL IEP: 854 MG/DL
IGM SER QL IEP: 233 MG/DL
IMM GRANULOCYTES NFR BLD AUTO: 0.5 %
INTERPRETATION SERPL IEP-IMP: NORMAL
KAPPA LC CSF-MCNC: 1.45 MG/DL
KAPPA LC SERPL-MCNC: 2.07 MG/DL
LYMPHOCYTES # BLD AUTO: 2.32 K/UL
LYMPHOCYTES NFR BLD AUTO: 16.9 %
M PROTEIN SPEC IFE-MCNC: NORMAL
MAN DIFF?: NORMAL
MCHC RBC-ENTMCNC: 28.4 PG
MCHC RBC-ENTMCNC: 29.8 GM/DL
MCV RBC AUTO: 95.3 FL
MONOCYTES # BLD AUTO: 0.27 K/UL
MONOCYTES NFR BLD AUTO: 2 %
NEUTROPHILS # BLD AUTO: 10.97 K/UL
NEUTROPHILS NFR BLD AUTO: 79.9 %
PLATELET # BLD AUTO: 406 K/UL
PROT SERPL-MCNC: 7 G/DL
PROT SERPL-MCNC: 7 G/DL
RBC # BLD: 4.23 M/UL
RBC # FLD: 13.2 %
WBC # FLD AUTO: 13.73 K/UL

## 2020-06-01 ENCOUNTER — OUTPATIENT (OUTPATIENT)
Dept: OUTPATIENT SERVICES | Facility: HOSPITAL | Age: 65
LOS: 1 days | Discharge: HOME | End: 2020-06-01

## 2020-06-01 DIAGNOSIS — Z98.890 OTHER SPECIFIED POSTPROCEDURAL STATES: Chronic | ICD-10-CM

## 2020-06-01 DIAGNOSIS — D72.829 ELEVATED WHITE BLOOD CELL COUNT, UNSPECIFIED: ICD-10-CM

## 2020-06-01 DIAGNOSIS — Z90.710 ACQUIRED ABSENCE OF BOTH CERVIX AND UTERUS: Chronic | ICD-10-CM

## 2020-06-01 DIAGNOSIS — Z90.49 ACQUIRED ABSENCE OF OTHER SPECIFIED PARTS OF DIGESTIVE TRACT: Chronic | ICD-10-CM

## 2020-06-02 ENCOUNTER — APPOINTMENT (OUTPATIENT)
Dept: HEMATOLOGY ONCOLOGY | Facility: CLINIC | Age: 65
End: 2020-06-02
Payer: COMMERCIAL

## 2020-06-02 PROCEDURE — 99202 OFFICE O/P NEW SF 15 MIN: CPT | Mod: 95

## 2020-06-02 NOTE — ASSESSMENT
[FreeTextEntry1] : No evidence of myeloproliferative disease.\par The leukocytosis looks timewise to be related to prednisone administration, and is of no clinical significance.\par \par 25 minutes spent reviewing history, discussing current clinical status, and planning future care.

## 2020-06-02 NOTE — HISTORY OF PRESENT ILLNESS
[Home] : at home, [unfilled] , at the time of the visit. [Medical Office: ([facility])___] : at the medical office located in  [Spouse] : spouse [Verbal consent obtained from patient] : the patient, [unfilled] [de-identified] : This 64 y/o woman is referred for WBC 16.0, repeat 13.7 in M<ay 2020.\par During mid-may she was treated with prednisone for asthma flare, and continued using inhaled steroids.\par No fever, or signs of infection.\par WBC normal both occasions. [de-identified] : Being evaluated for restless legs, fibromyalgia \par ALE + 1;320, speckled. Double-stranded DNA antibody is negative.

## 2020-06-02 NOTE — REASON FOR VISIT
[Initial Consultation] : an initial consultation for [Leukocytosis] : leukocytosis [Spouse] : spouse

## 2020-06-22 ENCOUNTER — APPOINTMENT (OUTPATIENT)
Dept: RHEUMATOLOGY | Facility: CLINIC | Age: 65
End: 2020-06-22
Payer: COMMERCIAL

## 2020-06-22 ENCOUNTER — APPOINTMENT (OUTPATIENT)
Dept: GYNECOLOGIC ONCOLOGY | Facility: CLINIC | Age: 65
End: 2020-06-22
Payer: COMMERCIAL

## 2020-06-22 VITALS
SYSTOLIC BLOOD PRESSURE: 102 MMHG | BODY MASS INDEX: 33.9 KG/M2 | HEART RATE: 112 BPM | DIASTOLIC BLOOD PRESSURE: 72 MMHG | WEIGHT: 210 LBS | OXYGEN SATURATION: 97 % | TEMPERATURE: 98.1 F

## 2020-06-22 VITALS
HEIGHT: 66 IN | BODY MASS INDEX: 33.75 KG/M2 | HEART RATE: 104 BPM | DIASTOLIC BLOOD PRESSURE: 74 MMHG | SYSTOLIC BLOOD PRESSURE: 104 MMHG | WEIGHT: 210 LBS | OXYGEN SATURATION: 96 %

## 2020-06-22 DIAGNOSIS — R79.89 OTHER SPECIFIED ABNORMAL FINDINGS OF BLOOD CHEMISTRY: ICD-10-CM

## 2020-06-22 DIAGNOSIS — Z71.2 PERSON CONSULTING FOR EXPLANATION OF EXAMINATION OR TEST FINDINGS: ICD-10-CM

## 2020-06-22 PROCEDURE — 99214 OFFICE O/P EST MOD 30 MIN: CPT

## 2020-06-22 NOTE — ASSESSMENT
[FreeTextEntry1] : 65-year-old female, here for first follow up w/ OA knee w/ +AEL >1:1280 speckled w/ Allergist with mainly reports of some raynaud's symptoms, dry mouth without much other symptoms on CTD/SLE at this time. \par -reviewed labs 5/19/2020 w/ decreasing ESR= 35 (from 70); Nl CRP; Immunoelectrophoresis NL; CBC w/ high WBC= 13.73 (from 16.07) was referred to Heme/Onc and told likely was 2/2 to steroids; low vitD repleted; low TSH; low T4 (saw PCP and requests Endo referral and given); +HSV-1IgG\par -labs from [facility] 5/1/2020 w/ RF normal; +ALE >1:1280 speckled\par -No synovitis or effusion on exam noted today and advised to monitor.\par -reviewed xray LT knee 5/19/2020 read as normal \par \par +ALE >1:1280: repeat ALE 1:320 homo/speckled; mainly reports of some raynaud's symptoms, dry mouth at times\par -Raynaud's: no ulcers and reports of color changes to red and purple in the hands & ears w/ the cold. Discussed to keep warm and if not controlled can add Ca channel blocker if BP allows\par -Clinically without much other symptoms of CTD/lupus at this time and educated on symptoms to monitor for in detail if she evolves.\par -reviewed labs w/ disease activity markers 5/15/2020 stable w/ DS-DNA neg; C3/C4 WNL; urine prot/creat ratio=0.1\par -lab as below w/ disease activity markers as below to be complete for monitoring on f/u \par \par hx of oral ulcers: once in a while & tend to be painful/ likely aphthous ulcers/ +HSV-1IgG\par -no ulcers today or recently; SLE ulcers tend to be painless \par -no genital or nasal ulcers at all\par \par Intermittent numbness/tingling: intermittent in hands. Advised to monitor for CTS symptoms in the hands.\par -reviewed metabolic labs incld folate, B12 normal. Discussed low TSH & low T4 and referred to Endo as requested.\par -if persistent can consider EMG w/ Neuro\par \par LBP: intermittent; not much pain today \par -Reviewed xray LS spine 5/19/2020 read as mild degenerative spondylosis; SI intact \par -Advil PRN w/ food needed sparingly helps\par \par Hypovitaminosis D: low vitD w/ prescription for vitD repletion sent as discussed.\par \par Dexa: 5/19/2020 read as normal reviewed \par \par HTN: TI=515/72, asymptomatic on lisinopril-HCTZ and monitors w/ PCP \par \par -educated on symptoms to monitor for in detail and alert us if any concerns.\par -knows to stay up to date on health maintenance w/ PCP\par -f/u in 9-12mo w/ labs or sooner as needed \par \par

## 2020-06-22 NOTE — ASSESSMENT
[FreeTextEntry1] : Clinically JEROME\par \par I discussed with the patient that she is now approximately two years from her gynecological cancer diagnosis.  I explained the rationale for extending the interval between her examinations to every six months.  The signs and symptoms of recurrence were reviewed and the patient knows to return to see me sooner if she has any of these or any other concerns prior to her next visit.  Reasonable expectations for cure were also discussed.\par

## 2020-06-22 NOTE — HISTORY OF PRESENT ILLNESS
[FreeTextEntry1] : 64 y/o F here for the first follow up w/ +ALE >1:1280 speckled w/ Allergist and referred here. \par States she had some swelling of her face and saw the Allergist and told all testing normal except her TSH was low w/ Nl free T4 and high +ALE.\par She states she was noting her ears turn bright red in the cold. States she notes her fingertips turn red/white in the cold/ rayanud's. \par Denies any swelling or redness or warmth of any joints.\par Denies any low grade fevers.\par Denies any malar rash or photosensitivity.\par States she has hx of painful oral ulcers/ ?aphthous once in a while and not now. She is [id number]+.\par Denies any nasal or genital ulcers. \par Denies any dry eyes.\par Notes dry mouth at times.\par Denies any infectious diarrhea or  symptoms at this time.\par Denies any hx of blood clots, no stroke; 1 healthy pregnancy without complications; no miscarriages. \par \par States she is postmenopausal with no Dexa in over 2 years and did Dexa to review. \par \par

## 2020-06-22 NOTE — REVIEW OF SYSTEMS
[As Noted in HPI] : as noted in HPI [Chills] : no chills [Fever] : no fever [Eye Pain] : no eye pain [Red Eyes] : eyes not red [Earache] : no earache [Nosebleeds] : no nosebleeds [Chest Pain] : no chest pain [Shortness Of Breath] : no shortness of breath [Abdominal Pain] : no abdominal pain [Vomiting] : no vomiting [Dysuria] : no dysuria [Skin Lesions] : no skin lesions [Confused] : no confusion [Convulsions] : no convulsions [Suicidal] : not suicidal [Proptosis] : no proptosis [Muscle Weakness] : no muscle weakness [Easy Bleeding] : no tendency for easy bleeding

## 2020-06-22 NOTE — HISTORY OF PRESENT ILLNESS
[FreeTextEntry1] : This 64y/o with history of stage 1A grade 1 endometrial cancer s/p RA TLH BSO b/l pelvic and para-aortic sentinel lymph node dissection, CHIOMA pineda on 03/27/18 presents for surveillance exam today. She denies any pelvic pain or VB. Reports having a colonoscopy in 2018 prior to her hysterectomy and gets frequent colonoscopies due to family history of colon cancer and personal history of polyps. DEXA on 5/20/20 was normal. She will be scheduling a mammogram. She recently saw Dr. Payton Danielson for her annual exam.  \par  \par

## 2020-06-22 NOTE — PHYSICAL EXAM
[Absent] : Adnexa(ae): Absent [Normal] : Bimanual Exam: Normal [de-identified] : Paula Conley was present as chaperone during examination of patient.

## 2020-06-22 NOTE — PHYSICAL EXAM
[General Appearance - Alert] : alert [General Appearance - Well Nourished] : well nourished [Sclera] : the sclera and conjunctiva were normal [Extraocular Movements] : extraocular movements were intact [Outer Ear] : the ears and nose were normal in appearance [Nasal Cavity] : the nasal mucosa and septum were normal [Respiration, Rhythm And Depth] : normal respiratory rhythm and effort [Neck Appearance] : the appearance of the neck was normal [Auscultation Breath Sounds / Voice Sounds] : lungs were clear to auscultation bilaterally [Heart Sounds] : normal S1 and S2 [Bowel Sounds] : normal bowel sounds [Heart Rate And Rhythm] : heart rate was normal and rhythm regular [Abdomen Soft] : soft [Abdomen Tenderness] : non-tender [Cervical Lymph Nodes Enlarged Anterior Bilaterally] : anterior cervical [Supraclavicular Lymph Nodes Enlarged Bilaterally] : supraclavicular [No CVA Tenderness] : no ~M costovertebral angle tenderness [No Spinal Tenderness] : no spinal tenderness [] : no rash [Motor Tone] : muscle strength and tone were normal [No Focal Deficits] : no focal deficits [Impaired Insight] : insight and judgment were intact [Cranial Nerves] : cranial nerves 2-12 were intact [Mood] : the mood was normal [FreeTextEntry1] : no synovitis or effusion on exam noted today

## 2020-07-13 ENCOUNTER — RX RENEWAL (OUTPATIENT)
Age: 65
End: 2020-07-13

## 2020-09-26 NOTE — BRIEF OPERATIVE NOTE - PRIMARY SURGEON
Patient stopped by window on 3/30/17 stating he has a question about an appointment he thinks he may staci for his INR.    Please advise,  Thank you.   Patient was called and states that Dr. Ramires office wants the patient remain on Warfarin until April 15, 2017.  Patient is calling to schedule an appointment for Monday 4/3/17.  April 15, 2017. Patient states that Dr. Wilde was going to give him a back injection but that will be on hold until he is off of warfarin, per patient.  He may start Physical therapy for his back before he gets a back injection.    SONIA Danielson MD Yes - the patient is able to be screened

## 2020-09-28 ENCOUNTER — APPOINTMENT (OUTPATIENT)
Dept: ORTHOPEDIC SURGERY | Facility: CLINIC | Age: 65
End: 2020-09-28
Payer: COMMERCIAL

## 2020-09-28 VITALS
HEIGHT: 66 IN | WEIGHT: 210 LBS | SYSTOLIC BLOOD PRESSURE: 115 MMHG | BODY MASS INDEX: 33.75 KG/M2 | HEART RATE: 106 BPM | DIASTOLIC BLOOD PRESSURE: 66 MMHG

## 2020-09-28 PROCEDURE — 20610 DRAIN/INJ JOINT/BURSA W/O US: CPT | Mod: RT

## 2020-09-28 PROCEDURE — 99214 OFFICE O/P EST MOD 30 MIN: CPT | Mod: 25

## 2020-09-28 PROCEDURE — 73030 X-RAY EXAM OF SHOULDER: CPT | Mod: RT

## 2020-09-28 RX ORDER — MELOXICAM 7.5 MG/1
7.5 TABLET ORAL
Qty: 21 | Refills: 0 | Status: COMPLETED | COMMUNITY
Start: 2020-09-28 | End: 2020-10-19

## 2020-09-28 NOTE — DISCUSSION/SUMMARY
[de-identified] : DELISA is a 65 year female who presents today with complaints of right shoulder and right elbow pain.\par \par In regards to her shoulder, based off of her current symptoms, clinical exam and radiographic finding's, I believe her primary complaint to be adhesive capsulitis of which conservative measures are indicated. We discussed the 3 stages of adhesive capsulitis. Stage I is the inflammatory stage where she is in pain. States she was a frozen stage where she has limited range of motion but minimal pain. Stage III is the thawing stage where her range of motion starts to increase.\par \par Pt due to her acute pain and stiffness elected for a corticosteroid injection at today's visit and tolerated the procedure well. She should take it easy for the next 2-3 days while icing the joint and they may start PT in 1 week from today, 2x/week x 6-8 weeks. At that time we will see her for clinical reassessment.\par \par In regards to her elbow, I believe her primary complaint for pain to be secondary to overuse from nitting with a diagnosis of lateral epicondylitis. I discussed with the patient the treatment of lateral epicondylitis. I discussed that this is a degenerative condition rather than an inflammatory process. The process is reversible, and the best source of treatment is to reduce the offending repetitive overuse injury process. I counseled the patient how to do this. In addition, treatment includes counterforce bracing, physical therapy for stretching and strengthening, and the use of injection therapy (steroids/PRP etc). I also discussed the role of surgical intervention for may become a chronic condition.\par \par At this time she elected for conservative measures with elbow compression band during all daytime activities, wrist splint that will be provided to her by our orthotist for while nitting and nighttime, formal physical therapy and a prescription for meloxicam once daily for 21 days. I counseled the patient on the side effects of meloxicam and the possible negative sequelae.. If she does develop any acid reflux or abdominal pain they should stop the medication and then call us. We'll see her back in 6-8 weeks for clinical reassessment. She agrees with the above plan all questions were answered.

## 2020-09-28 NOTE — CONSULT LETTER
[Dear  ___] : Dear  [unfilled], [Consult Letter:] : I had the pleasure of evaluating your patient, [unfilled]. [Please see my note below.] : Please see my note below. [Consult Closing:] : Thank you very much for allowing me to participate in the care of this patient.  If you have any questions, please do not hesitate to contact me. [Sincerely,] : Sincerely, [FreeTextEntry2] : DANIEL SANTO\par  [FreeTextEntry3] : Carmine Sullivan DO.\par Sports Medicine \par \par Orthopaedic Surgery\par \par [facility] [place] [facility]\par \par [address] \par [address]\[place] [address]\par \par [address] [facility] \[address] \[place] [address]\par \par Tel [phone]\par Fax [phone]\par \par

## 2020-09-28 NOTE — PROCEDURE
[de-identified] : Injection: Right shoulder (Subacromial).\par Indication: Adhesive Capsulitis\par \par A discussion was had with the patient regarding this procedure and all questions were answered. All risks, benefits and alternatives were discussed. These included but were not limited to bleeding, infection, and allergic reaction. Alcohol was used to clean the skin, and betadine was used to sterilize and prep the area in the posterior aspect of the right shoulder. Ethyl chloride spray was then used as a topical anesthetic. A 22-gauge needle was used to inject 3cc 1% xylocaine, 2cc 0.25% bupivacaine and 1cc of 40mg/mL triamcinolone acetonide into the right subacromial space. A sterile bandage was then applied. The patient tolerated the procedure well and there were no complications.\par

## 2020-09-28 NOTE — PHYSICAL EXAM
[de-identified] : Physical Exam:\par General: Well appearing, no acute distress, A&Ox3\par Neurologic: No focal deficits\par Head: NCAT without abrasions, lacerations, or ecchymosis to head, face, or scalp\par Eyes: No scleral icterus, no gross abnormalities\par Respiratory: Equal chest wall expansion bilaterally, no accessory muscle use\par Lymphatic: No lymphadenopathy palpated\par Skin: Warm and dry\par Psychiatric: Normal mood and affect\par \par C-Spine\par Palpation: Tenderness to paraspinal muscles and trapezius muscle\par ROM: side bending, symmetrical. Pain with extension and flexion of the neck.\par Reflexes: C5-7 [normal]\par \par Right Shoulder\par ·	Inspection/Palpation: No tenderness, no crepitus, no deformities\par ·	Range of Motion: no crepitus with ROM; Active FF 90; ER at side 25; IR to belt; Passive FF 90, ER at side 30, IR to belt\par Arc of Motion: ER to 5 degrees, IR to 5 degrees\par ·	Strength: forward elevation in scapular plane [4/5], internal rotation [4/5], external rotation [4/5], adduction [4/5] and abduction [4/5]\par ·	Stability: no joint instability on provocative testing\par ·	Tests: Ordonez test positive, Neer positive, positive drop arm test secondary to pain, bear hug test positive, Napolean sign POS, cross arm adduction positive, lift off sign positive, hornblowers sign POS, speeds test negative, Yergason's test negative, Beth's Active Compression test negative, whipple test positive, bicep's load II test negative\par \par Left Shoulder\par ·	Inspection/Palpation: no tenderness, swelling or deformities\par ·	Range of Motion: full and painless in all planes, no crepitus\par ·	Strength: forward elevation in scapular plane 5/5, internal rotation 5/5, external rotation 5/5, adduction 5/5 and abduction 5/5\par ·	Stability: no joint instability on provocative testing\par Tests: Ordonez test negative, Neer sign negative, negative drop arm test secondary to pain, bear hug test negative, Napolean sign negative, cross arm adduction negative, lift off sign positive, hornblowers sign negative, speeds test negative, Yergason's test negative, no bicipital groove tenderness, Beth's Active Compression test negative\par \par Elbow Exam\par \par Skin: Clean, dry, intact. No ecchymosis. No swelling. No palpable joint effusion.\par ROM: RIGHT 0-130, full supination/pronation.  LEFT 0-140, full supination/pronation.\par Painful ROM: Pronation to lateral right elbow\par Tenderness: [No] medial epicondyle pain. Lateral epicondyle pain. [No] olecranon pain. No pain at radial head.\par Strength: 5/5 elbow flexion, 5/5 elbow extension, 5/5 supination, 5/5 pronation\par Stability: Stable to vaus/valgus stress\par Vasc: 2+ radial pulse, <2s cap refill\par Sensation: In tact to light touch throughout\par Neuro: Negative tinels at ulnar canal, AIN/PIN/Ulnar nerve in tact to motor/sensation.\par \par Special Tests:\par Dorsiflexion Against Resistance: Negative\par Flexion of Wrist Against Resistance: POS\par Resistance Against Pronation: POS\par Resistance Against Supination: Negative\par Tinel's Sign Cubital Tunnel: Negative [de-identified] : 4 views of the right shoulder were ordered and taken in the office and demonstrate degenerative joint disease of the shoulder with joint space narrowing, osteophyte formation, and subchondral sclerosis.

## 2020-09-28 NOTE — HISTORY OF PRESENT ILLNESS
[de-identified] : DELISA is a 65 year female who presents today with complaints of right shoulder pain x 2-3 months and right elbow pain x 1-2 months. She admits to retiring in June and since then spending more time relaxing at her cottage in [place]. She admits to leisurely nitting for hours at a time that she reports doing less of recently. She reports tennis elbow x 7 years ago where a cortisone injection was performed and no issues since then until recently.\par \par Patient admits to a hx. sx.left shoulder arthroscopy for debridement of osteophytes and small RTC repair, 3.5 years ago with Dr. De La Cruz. She reports the same feelings to her right shoulder that she had in her left shoulder at that time. She denies injury to her right shoulder/ right elbow at this time and denies paresthesias.

## 2020-10-05 NOTE — PHYSICAL THERAPY INITIAL EVALUATION ADULT - ASR WT BEARING STATUS EVAL
[FreeTextEntry1] : 62 y/o F with HTN and hypothyroidism here with benign neutropenia. \par \par -Patient with history of neutropenia, although counts normal now. Low percentage relative to lymphocyte population, however no significant or worrisome findings of frequent infection, mouth sores, etc. \par -[place] ethnicity (from [facility]). 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 \par -Follow CBC routinely. No need for further workup at this time. \par -Patient understands and agrees with plan.  WBAT right LE

## 2020-10-13 NOTE — ED ADULT NURSE NOTE - PAIN RATING/NUMBER SCALE (0-10): ACTIVITY
Name: Tal Schneider  : 48 year old  Date:           Chief Complaint:  Anxiety follow up     History of Present Illness:  Patient comes today with concerns over her anxiety she is seeing a professional psychiatrist and counseling his put her on lorazepam 1 mg 3 times daily as well as duloxetine 60 mg p.o. daily.  She denies any suicidal or homicidal ideation but is worried that she may become dependent on lorazepam.  We talked about chemical dependency of benzodiazepines and to follow her psychiatric practitioner's recommendations.    The patient became very tearful as she has many concerns involving using such medications with her Church as a [employer] woman she was given much encouragement today after a tearful episode and she is resolved to follow her psychiatric care providers guidance.  ALLERGIES:   Allergen Reactions   • Penicillin G HIVES     Active Ambulatory Problems     Diagnosis Date Noted   • Periumbilical abdominal pain 2020   • Sorethroat 2020     Resolved Ambulatory Problems     Diagnosis Date Noted   • No Resolved Ambulatory Problems     No Additional Past Medical History     History reviewed. No pertinent surgical history.  Social History     Socioeconomic History   • Marital status: /Civil Union     Spouse name: Not on file   • Number of children: Not on file   • Years of education: Not on file   • Highest education level: Not on file   Occupational History   • Not on file   Social Needs   • Financial resource strain: Not on file   • Food insecurity     Worry: Not on file     Inability: Not on file   • Transportation needs     Medical: Not on file     Non-medical: Not on file   Tobacco Use   • Smoking status: Never Smoker   • Smokeless tobacco: Never Used   Substance and Sexual Activity   • Alcohol use: Yes     Frequency: 4 or more times a week     Drinks per session: 1 or 2   • Drug use: No   • Sexual activity: Not on file   Lifestyle   • Physical activity     Days per week:  Not on file     Minutes per session: Not on file   • Stress: Not on file   Relationships   • Social connections     Talks on phone: Not on file     Gets together: Not on file     Attends Church service: Not on file     Active member of club or organization: Not on file     Attends meetings of clubs or organizations: Not on file     Relationship status: Not on file   • Intimate partner violence     Fear of current or ex partner: Not on file     Emotionally abused: Not on file     Physically abused: Not on file     Forced sexual activity: Not on file   Other Topics Concern   • Not on file   Social History Narrative    History of Present Illness    Since last year trying to eat more alkaline...    a lot of things are going away    wondering if every will get off medicine         friend in stage 4 cancer and started to research eating    thinking about Dr Gupta         wondering about smoothies her 80% veggeis and soups          85% of aches and pains going away     Pain knees back neck    Digestion better similar as pooping 3x daily    Anxiety better 75%    Sleep about the same    weight 9lb less.          Review of Systems         All other systems reviewed and negative.          Allergies    No Known Drug Allergies         Current Meds     1. DULoxetine HCl - 60 MG Oral Capsule Delayed Release Particles; TAKE 1 CAPSULE BY     MOUTH DAILY;     Therapy: 02Oct2017 to (Evaluate:27Apr2018)  Requested for: 28Mar2018; Last     Rx:28Mar2018 Ordered    Re-live -wondering about it world wife water soluble...as per midwife in [place] ([facility])         thyroid synergy taking before preg....few yrs before as DO said thyroid somehting to do with this....    daily since about 2014         occ mag         stopped prenatal about 4 weeks ago.          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    sleep well with medicine         no caviity since after pregnancys                   .          Surgical History         hernia 14.          Family History         mom    dad    sib    kids 5.          Social History    Never smoker         [employer] works at [email or web address] 20 hours week over 2 days         just moved into house when baby born 5 weeks ago.....from [email or web address]         no therapy but reads a lot         exercise 30 min daily with baby strapped on....started running 7 17 16    sprinter basektbal super atheltic    as of 4 18 18 exercise 3-5x week 30-45 min         no tobacco    drink weekly 1 serving    no mj         sleep ok    get to bed with baby 9    up at 630    feeding x 2 thorough out night    kids in bed 9    chocolate dark 80%          eating faviorite is meat and veggies but with kids in baseball legues to much fast food during season    alkaline diet as of march 2018 and smoothies but mostly banan strwawerberr    not get veggies aldi    .              Review of Systems   Constitutional: Negative for activity change, appetite change, chills, fatigue and fever.   HENT: Negative for congestion, ear pain, nosebleeds and trouble swallowing.    Respiratory: Negative for cough, chest tightness, shortness of breath and stridor.    Cardiovascular: Negative for chest pain.   Gastrointestinal: Negative for constipation, diarrhea, nausea and vomiting.   Genitourinary: Negative for dysuria and urgency.    Musculoskeletal: Negative for back pain and gait problem.   Skin: Negative for pallor.   Neurological: Negative for dizziness, tremors, syncope, weakness and headaches.   Psychiatric/Behavioral: Negative for confusion and suicidal ideas. The patient is not nervous/anxious and is not hyperactive.      Physical Exam   Constitutional: She appears well-developed and well-nourished.   HENT:   Head: Normocephalic and atraumatic.   Eyes: Pupils are equal, round, and reactive to light.   Neck: Neck supple. No JVD present. No tracheal deviation present.   Cardiovascular: Normal rate and regular rhythm. Exam reveals no friction rub.   No murmur heard.  Pulmonary/Chest: Effort normal and breath sounds normal. No stridor. No respiratory distress. She has no wheezes.   Abdominal: Soft. Bowel sounds are normal. She exhibits no distension and no mass. There is no abdominal tenderness. There is no rebound and no guarding.   Musculoskeletal: Normal range of motion.   Lymphadenopathy:     She has no cervical adenopathy.   Skin: Skin is warm and dry.   Psychiatric:   Denies suicidal homicidal ideation     Visit Vitals  /60 (BP Location: LUE - Left upper extremity, Patient Position: Sitting, Cuff Size: Regular)   Temp 97.3 °F (36.3 °C) (Skin)   Ht 5' 4\" (1.626 m)   Wt 66.2 kg (146 lb)   BMI 25.06 kg/m²     Current Medications    DESVENLAFAXINE (PRISTIQ) 50 MG 24 HR TABLET        DULOXETINE (CYMBALTA) 60 MG CAPSULE    Take 60 mg by mouth daily.    GENTAMICIN (GARAMYCIN) 0.3 % OPHTHALMIC SOLUTION    1-2 drops every 2-3 hours for 5 days    LORAZEPAM (ATIVAN) 1 MG TABLET    TK 1 T PO D UTD    OMEPRAZOLE (PRILOSEC) 20 MG CAPSULE    Take 1 capsule by mouth daily.     Anxiety          PLAN:  The patient was taught CBT therapies such as deep breathing, relaxation and guided imagery   The patient was told to keep a journal of anxiety triggers, noting intensity, duration, location and recovery time  The patient denies any suicidal  3 thoughts or ideation  The patient finds their anxiety is triggered by multiple events   events   CLARITZA 7 score: 10  Patient will continue her duloxetine and lorazepam per her psychiatrist follow-up as needed  The patient will call the clinic or EMS for any suicidal ideation or thoughts   The patient will follow up with psychiatry  Long term care plan for mental health was discussed       Refills may be given through the follow-up timeframe and for one  courtesy refill if follow-up timeframe is not met  Refills may not be authorized for management of acute illnesses regardless of follow-up timeframe  The patient verbalized understanding of the plan of care and the risks involved with treatment and agreed.   If any chest pain, shortness of breath, dizziness or syncope occur the patient was asked to call EMS or report the local ED     Zack Douglas CNP

## 2020-11-09 ENCOUNTER — APPOINTMENT (OUTPATIENT)
Dept: ORTHOPEDIC SURGERY | Facility: CLINIC | Age: 65
End: 2020-11-09
Payer: COMMERCIAL

## 2020-11-09 PROCEDURE — 99072 ADDL SUPL MATRL&STAF TM PHE: CPT

## 2020-11-09 PROCEDURE — 99214 OFFICE O/P EST MOD 30 MIN: CPT

## 2020-11-09 NOTE — DISCUSSION/SUMMARY
[de-identified] : DELISA is a 65 year female who presents today for follow up for right shoulder pain secondary to adhesive capsulitis. On 9/28 at her initial evaluation an injection was performed and PT recommended. Patient wasn’t aware that PT was recommended and never attended. She feels her pain is worse since ending a 21 day course of Mobic. She feels her ROM is the same with stiffness still persistent. We discussed the 3 stages of adhesive capsulitis. Stage I is the inflammatory stage where she is in pain. States she was a frozen stage where she has limited range of motion but minimal pain. Stage III is the thawing stage where her range of motion starts to increase. \par \par Of note, her right elbow pain has since resolved.. \par \par I would like her to start formal PT at this time 2x/week until seeing us in 6-8 weeks from now alongside use of a medrol dose pack as prescribed. At that time we will see her for clinical reassessment. She agrees with the above plan and all questions were answered.\par

## 2020-11-09 NOTE — PHYSICAL EXAM
[de-identified] : Physical Exam:\par General: Well appearing, no acute distress, A&Ox3\par Neurologic: No focal deficits\par Head: NCAT without abrasions, lacerations, or ecchymosis to head, face, or scalp\par Eyes: No scleral icterus, no gross abnormalities\par Respiratory: Equal chest wall expansion bilaterally, no accessory muscle use\par Lymphatic: No lymphadenopathy palpated\par Skin: Warm and dry\par Psychiatric: Normal mood and affect\par \par C-Spine\par Palpation: Tenderness to paraspinal muscles and trapezius muscle\par ROM: side bending, symmetrical. Pain with extension and flexion of the neck.\par Reflexes: C5-7 [normal]\par \par Right Shoulder\par ·	Inspection/Palpation: No tenderness, no crepitus, no deformities\par ·	Range of Motion: no crepitus with ROM; Active ; ER at side 25; IR to back pocket; Passive , ER at side 30, IR to back pocket\par Arc of Motion: ER to 5 degrees, IR to 5 degrees\par ·	Strength: forward elevation in scapular plane [4/5], internal rotation [4/5], external rotation [4/5], adduction [4/5] and abduction [4/5]\par ·	Stability: no joint instability on provocative testing\par ·	Tests: Ordonez test positive, Neer positive, positive drop arm test secondary to pain, bear hug test positive, Napolean sign POS, cross arm adduction positive, lift off sign positive, hornblowers sign POS, speeds test negative, Yergason's test negative, Beth's Active Compression test negative, whipple test positive, bicep's load II test negative\par \par Left Shoulder\par ·	Inspection/Palpation: no tenderness, swelling or deformities\par ·	Range of Motion: full and painless in all planes, no crepitus\par ·	Strength: forward elevation in scapular plane 5/5, internal rotation 5/5, external rotation 5/5, adduction 5/5 and abduction 5/5\par ·	Stability: no joint instability on provocative testing\par Tests: Ordonez test negative, Neer sign negative, negative drop arm test secondary to pain, bear hug test negative, Napolean sign negative, cross arm adduction negative, lift off sign positive, hornblowers sign negative, speeds test negative, Yergason's test negative, no bicipital groove tenderness, Beth's Active Compression test negative\par \par Elbow Exam\par \par Skin: Clean, dry, intact. No ecchymosis. No swelling. No palpable joint effusion.\par ROM: RIGHT 0-140, full supination/pronation.  LEFT 0-140, full supination/pronation.\par Painful ROM: None\par Tenderness: [No] medial epicondyle pain. No lateral epicondyle pain. [No] olecranon pain. No pain at radial head.\par Strength: 5/5 elbow flexion, 5/5 elbow extension, 5/5 supination, 5/5 pronation\par Stability: Stable to varus/valgus stress\par Vasc: 2+ radial pulse, <2s cap refill\par Sensation: In tact to light touch throughout\par Neuro: Negative tinels at ulnar canal, AIN/PIN/Ulnar nerve in tact to motor/sensation.\par \par Special Tests:\par Dorsiflexion Against Resistance: Negative\par Flexion of Wrist Against Resistance: NEG\par Resistance Against Pronation: NEG\par Resistance Against Supination: Negative\par Tinel's Sign Cubital Tunnel: Negative

## 2020-11-09 NOTE — HISTORY OF PRESENT ILLNESS
[de-identified] : DELISA is a 65 year female who presents today for follow up for right shoulder pain secondary to adhesive capsulitis. On 9/28 at her initial evaluation an injection was performed and PT recommended. Patient wasn’t aware that PT was recommended and never attended. She feels her pain is worse since ending a 21 day course of Mobic. She feels her ROM is the same with stiffness still persistent.\par \par Of note, her right elbow pain has since resolved.

## 2020-11-11 ENCOUNTER — APPOINTMENT (OUTPATIENT)
Dept: ORTHOPEDIC SURGERY | Facility: CLINIC | Age: 65
End: 2020-11-11
Payer: COMMERCIAL

## 2020-11-11 VITALS
HEART RATE: 98 BPM | SYSTOLIC BLOOD PRESSURE: 131 MMHG | BODY MASS INDEX: 33.75 KG/M2 | DIASTOLIC BLOOD PRESSURE: 78 MMHG | HEIGHT: 66 IN | WEIGHT: 210 LBS

## 2020-11-11 VITALS — TEMPERATURE: 97.2 F

## 2020-11-11 DIAGNOSIS — S80.01XA CONTUSION OF RIGHT KNEE, INITIAL ENCOUNTER: ICD-10-CM

## 2020-11-11 DIAGNOSIS — S80.11XA CONTUSION OF RIGHT KNEE, INITIAL ENCOUNTER: ICD-10-CM

## 2020-11-11 DIAGNOSIS — Z91.81 HISTORY OF FALLING: ICD-10-CM

## 2020-11-11 PROCEDURE — 99214 OFFICE O/P EST MOD 30 MIN: CPT

## 2020-11-11 PROCEDURE — 99072 ADDL SUPL MATRL&STAF TM PHE: CPT

## 2020-11-11 PROCEDURE — 73562 X-RAY EXAM OF KNEE 3: CPT | Mod: RT

## 2020-11-11 RX ORDER — ALBUTEROL SULFATE 2.5 MG/3ML
(2.5 MG/3ML) SOLUTION RESPIRATORY (INHALATION)
Qty: 90 | Refills: 0 | Status: ACTIVE | COMMUNITY
Start: 2020-06-30

## 2020-11-11 RX ORDER — OMEPRAZOLE 40 MG/1
40 CAPSULE, DELAYED RELEASE ORAL
Qty: 90 | Refills: 0 | Status: ACTIVE | COMMUNITY
Start: 2020-08-27

## 2020-11-11 RX ORDER — TIOTROPIUM BROMIDE INHALATION SPRAY 1.56 UG/1
1.25 SPRAY, METERED RESPIRATORY (INHALATION)
Qty: 12 | Refills: 0 | Status: ACTIVE | COMMUNITY
Start: 2020-08-14

## 2020-11-11 NOTE — REASON FOR VISIT
[Follow-Up Visit] : a follow-up visit for [Other: ____] : [unfilled] [FreeTextEntry2] : S/P Right total knee arthroplasty. Computer assisted tibial resection, OrthAlign procedure. DOS: 07/30/19. \par

## 2020-11-11 NOTE — END OF VISIT
[FreeTextEntry3] : I, Vishnu Diego, acted solely as a scribe for Dr. Karlos De La Vega on this date 11/11/2020.

## 2020-11-11 NOTE — PHYSICAL EXAM
[LE] : Sensory: Intact in bilateral lower extremities [ALL] : dorsalis pedis, posterior tibial, femoral, popliteal, and radial 2+ and symmetric bilaterally [Antalgic] : not antalgic [de-identified] : GENERAL APPEARANCE: Well nourished and hydrated, pleasant, alert, and oriented x 3. Appears their stated age. \par HEENT: Normocephalic, extraocular eye motion intact. Nasal septum midline. Oral cavity clear. External auditory canal clear. \par RESPIRATORY: Breath sounds clear and audible in all lobes. No wheezing, No accessory muscle use.\par CARDIOVASCULAR: No apparent abnormalities. No lower leg edema. No varicosities. Pedal pulses are palpable.\par NEUROLOGIC: Sensation is normal, no muscle weakness in the upper or lower extremities.\par DERMATOLOGIC: No apparent skin lesions, moist, warm, no rash.\par SPINE: Cervical spine appears normal and moves freely; thoracic spine appears normal and moves freely; lumbosacral spine appears normal and moves freely, normal, nontender.\par MUSCULOSKELETAL: Hands, wrists, and elbows are normal and move freely, shoulders are normal and move freely.  [de-identified] : Examination of right knee demonstrate well-healed incision. Smooth painless range of motion of 0-120 degree. medial joint line tenderness  [de-identified] : 3V xray of the right knee done in the office today and reviewed by Dr. Karlos De La Vega demonstrates s/p implants in good positioning with no evidence of wear, loosening, or subsidence.

## 2020-11-11 NOTE — DISCUSSION/SUMMARY
[de-identified] : 66 y/o F s/p right TKA on 7/30/2019. The patient fell about 5-6 weeks ago and started to have right knee pain. The pain is in the medial aspect of the knee. We explained to the patient that the pain may be due to a MCL sprain and it should heal with time. We reassured the pt that her hardware is in good positioning with no evidence of wear, loosening, or subsidence. Pt understands the importance of prophylaxis for invasive dental procedures. F/u annually for radiographic surveillance. \par \par \par The patient is a 65 year individual with end stage arthritis of their right knee joint. Based upon the patient's continued symptoms and failure to respond to conservative treatment, pt successfully completed right total knee arthroplasty. A long discussion took place with the patient describing what a total joint replacement is and what the procedure would entail. A total knee arthroplasty model, similar to the implant that was used during the operation, was utilized to demonstrate and to discuss the various bearing surfaces of the implants. The hospitalization and post-operative care and rehabilitation were also discussed. The use of perioperative antibiotics and DVT prophylaxis were discussed. The risk, benefits and alternatives to a surgical intervention were discussed at length with the patient. The patient was also advised of risks related to the medical comorbidities, elevated body mass index (BMI), and smoking where applicable. We discussed how to reduce modifiable risk factors and encouraged smoking cessation were applicable.. A lengthy discussion took place to review the most common complications including but not limited to: deep vein thrombosis, pulmonary embolus, heart attack, stroke, infection, wound breakdown, numbness, damage to nerves, tendon, muscles, arteries or other blood vessels, death and other possible complications from anesthesia. The patient was told that we will take steps to minimize these risks by using sterile technique, antibiotics and DVT prophylaxis when appropriate and follow the patient postoperatively in the office setting to monitor progress. The possibility of recurrent pain, no improvement in pain and actual worsening of pain were also discussed with the patient.\par The discharge plan of care focused on the patient going home following surgery. The patient was encouraged to make the necessary arrangements to have someone stay with them when they are discharged home. Following discharge, a home care nurse was to the patient. The home care nurse would open the patient’s home care case and request home physical therapy services. Home physical therapy was to commence following discharge provided it was appropriate and covered by the health insurance benefit plan. \par The benefits of surgery were discussed with the patient including the potential for improving her current clinical condition through operative intervention. Alternatives to surgical intervention including continued conservative management were also discussed in detail. All questions were answered to the satisfaction of the patient. The treatment plan of care, as well as a model of a total knee arthroplasty equivalent to the one that will be used for their total joint replacement, was shared with the patient. The patient agreed to the plan of care as well as the use of implants in their total joint replacement.

## 2020-11-11 NOTE — HISTORY OF PRESENT ILLNESS
[2] : a current pain level of 2/10 [Intermit.] : ~He/She~ states the symptoms seem to be intermittent [Knee Flexion] : worsened with knee flexion [Pain Location] : pain [0] : a minimum pain level of 0/10 [3] : a maximum pain level of 3/10 [Rest] : relieved by rest [de-identified] : 64 y/o F s/p right TKA on 7/30/2019. Pt is having right knee pain after a fall that occurred about 5-6 weeks ago. She fell backwards landed on the buttock. She notes pain in the right medial knee. Pt denies swelling or instability. Pt is walking and able to bear wt. She was doing very well until the fall.

## 2020-12-15 ENCOUNTER — APPOINTMENT (OUTPATIENT)
Dept: ORTHOPEDIC SURGERY | Facility: CLINIC | Age: 65
End: 2020-12-15
Payer: COMMERCIAL

## 2020-12-15 PROCEDURE — 99072 ADDL SUPL MATRL&STAF TM PHE: CPT

## 2020-12-15 PROCEDURE — 99214 OFFICE O/P EST MOD 30 MIN: CPT | Mod: 25

## 2020-12-15 PROCEDURE — 20610 DRAIN/INJ JOINT/BURSA W/O US: CPT | Mod: RT

## 2020-12-15 NOTE — HISTORY OF PRESENT ILLNESS
[Improving] : improving [7] : a minimum pain level of 7/10 [9] : a maximum pain level of 9/10 [Intermit.] : ~He/She~ states the symptoms seem to be intermittent [Physical Therapy] : relieved by physical therapy [de-identified] : DELISA is a 65 year female who presents today for follow up for right shoulder pain secondary to adhesive capsulitis. On 9/28 at her initial evaluation an injection was performed and PT recommended. Patient wasn’t aware that PT was recommended and never attended. She feels her pain is worse since ending a 21 day course of Mobic. She feels her ROM is the same with stiffness still persistent.\par \par Currently, patient's pain is steadily decreasing.  She states it has gone from a 9/10 to a 7/10.  The Medrol Dosepak decreased her pain moderately and she currently performs PT which is helping her symptoms also.   [de-identified] : oral steroids

## 2020-12-15 NOTE — DISCUSSION/SUMMARY
[de-identified] : Bushra is a 65-year-old female comes in with mild improvement in pain of her right shoulder.  Her range of motion has improved significantly.  Today she elected for cortisone injection.  She tolerated the procedure well.  I will see her back in 6 to 8 weeks for clinical reassessment.  She agrees with above plan all questions were answered.

## 2020-12-15 NOTE — PROCEDURE
[de-identified] : Injection: Right shoulder (Subacromial).\par Indication: Rotator cuff tendinitis and adhesive capsulitis.\par \par A discussion was had with the patient regarding this procedure and all questions were answered. All risks, benefits and alternatives were discussed. These included but were not limited to bleeding, infection, and allergic reaction. Alcohol was used to clean the skin, and betadine was used to sterilize and prep the area in the posterior aspect of the right shoulder. Ethyl chloride spray was then used as a topical anesthetic. A 22-gauge needle was used to inject 3cc 1% xylocaine, 2cc 0.5% bupivacaine and 1cc of 40mg/mL triamcinolone acetonide into the right subacromial space. A sterile bandage was then applied. The patient tolerated the procedure well and there were no complications.

## 2020-12-15 NOTE — PHYSICAL EXAM
[de-identified] : Physical Exam:\par General: Well appearing, no acute distress, A&Ox3\par Neurologic: No focal deficits\par Head: NCAT without abrasions, lacerations, or ecchymosis to head, face, or scalp\par Eyes: No scleral icterus, no gross abnormalities\par Respiratory: Equal chest wall expansion bilaterally, no accessory muscle use\par Lymphatic: No lymphadenopathy palpated\par Skin: Warm and dry\par Psychiatric: Normal mood and affect\par \par C-Spine\par Palpation: Tenderness to paraspinal muscles and trapezius muscle\par ROM: side bending, symmetrical. Pain with extension and flexion of the neck.\par Reflexes: C5-7 [normal]\par \par Right Shoulder\par ·	Inspection/Palpation: No tenderness, no crepitus, no deformities\par ·	Range of Motion: no crepitus with ROM; Active ; ER at side 25; IR to back pocket; Passive , ER at side 30, IR to back pocket\par Arc of Motion: ER to 5 degrees, IR to 5 degrees\par ·	Strength: forward elevation in scapular plane [4/5], internal rotation [4/5], external rotation [4/5], adduction [4/5] and abduction [4/5]\par ·	Stability: no joint instability on provocative testing\par ·	Tests: Ordonez test positive, Neer positive, positive drop arm test secondary to pain, bear hug test positive, Napolean sign POS, cross arm adduction positive, lift off sign positive, hornblowers sign POS, speeds test negative, Yergason's test negative, Beth's Active Compression test negative, whipple test positive, bicep's load II test negative\par \par Left Shoulder\par ·	Inspection/Palpation: no tenderness, swelling or deformities\par ·	Range of Motion: full and painless in all planes, no crepitus\par ·	Strength: forward elevation in scapular plane 5/5, internal rotation 5/5, external rotation 5/5, adduction 5/5 and abduction 5/5\par ·	Stability: no joint instability on provocative testing\par Tests: Ordonez test negative, Neer sign negative, negative drop arm test secondary to pain, bear hug test negative, Napolean sign negative, cross arm adduction negative, lift off sign positive, hornblowers sign negative, speeds test negative, Yergason's test negative, no bicipital groove tenderness, Beth's Active Compression test negative\par \par Elbow Exam\par \par Skin: Clean, dry, intact. No ecchymosis. No swelling. No palpable joint effusion.\par ROM: RIGHT 0-140, full supination/pronation.  LEFT 0-140, full supination/pronation.\par Painful ROM: None\par Tenderness: [No] medial epicondyle pain. No lateral epicondyle pain. [No] olecranon pain. No pain at radial head.\par Strength: 5/5 elbow flexion, 5/5 elbow extension, 5/5 supination, 5/5 pronation\par Stability: Stable to varus/valgus stress\par Vasc: 2+ radial pulse, <2s cap refill\par Sensation: In tact to light touch throughout\par Neuro: Negative tinels at ulnar canal, AIN/PIN/Ulnar nerve in tact to motor/sensation.\par \par Special Tests:\par Dorsiflexion Against Resistance: Negative\par Flexion of Wrist Against Resistance: NEG\par Resistance Against Pronation: NEG\par Resistance Against Supination: Negative\par Tinel's Sign Cubital Tunnel: Negative

## 2020-12-21 ENCOUNTER — APPOINTMENT (OUTPATIENT)
Dept: GYNECOLOGIC ONCOLOGY | Facility: CLINIC | Age: 65
End: 2020-12-21

## 2021-01-04 ENCOUNTER — APPOINTMENT (OUTPATIENT)
Dept: GYNECOLOGIC ONCOLOGY | Facility: CLINIC | Age: 66
End: 2021-01-04
Payer: COMMERCIAL

## 2021-01-04 VITALS
DIASTOLIC BLOOD PRESSURE: 82 MMHG | WEIGHT: 210 LBS | RESPIRATION RATE: 16 BRPM | OXYGEN SATURATION: 96 % | BODY MASS INDEX: 33.75 KG/M2 | HEIGHT: 66 IN | HEART RATE: 106 BPM | SYSTOLIC BLOOD PRESSURE: 124 MMHG

## 2021-01-04 PROCEDURE — 99072 ADDL SUPL MATRL&STAF TM PHE: CPT

## 2021-01-04 PROCEDURE — 99212 OFFICE O/P EST SF 10 MIN: CPT

## 2021-01-04 NOTE — PHYSICAL EXAM
[Absent] : Adnexa(ae): Absent [Normal] : Bimanual Exam: Normal [de-identified] : Kamila Maya MA was present as chaperone during physical examination

## 2021-01-04 NOTE — HISTORY OF PRESENT ILLNESS
[FreeTextEntry1] : This 66y/o with history of stage 1A grade 1 endometrial cancer (no myometrial invasion) s/p RA TLH BSO b/l pelvic and para-aortic sentinel lymph node dissection, CHIOMA pindea on 03/27/18 presents for surveillance exam today. She denies any pelvic pain or VB. Reports having a colonoscopy in 2018 prior to her hysterectomy and gets frequent colonoscopies due to family history of colon cancer and personal history of polyps. DEXA on 5/20/20 was normal. She will be scheduling a mammogram. She is scheduled to see Dr. Payton Danielson for her annual exam. \par \par Pt is excited about her daughter being 3 months pregnant, her first grandchild.  \par  \par \par

## 2021-01-04 NOTE — REASON FOR VISIT
[FreeTextEntry1] : [facility]\par \par [facility] [phone] at [address] [place] [phone]\par

## 2021-01-04 NOTE — HISTORY OF PRESENT ILLNESS
[FreeTextEntry1] : This 66y/o with history of stage 1A grade 1 endometrial cancer (no myometrial invasion) s/p RA TLH BSO b/l pelvic and para-aortic sentinel lymph node dissection, CHIOMA pineda on 03/27/18 presents for surveillance exam today. She denies any pelvic pain or VB. Reports having a colonoscopy in 2018 prior to her hysterectomy and gets frequent colonoscopies due to family history of colon cancer and personal history of polyps. DEXA on 5/20/20 was normal. She will be scheduling a mammogram. She is scheduled to see Dr. Payton Danielson for her annual exam. \par \par Pt is excited about her daughter being 3 months pregnant, her first grandchild.  \par  \par \par

## 2021-01-04 NOTE — REASON FOR VISIT
[FreeTextEntry1] : [facility]\par \par [facility] [phone] at [address] [place] [id number]\par

## 2021-01-04 NOTE — PHYSICAL EXAM
[Absent] : Adnexa(ae): Absent [Normal] : Bimanual Exam: Normal [de-identified] : Kamila Maya MA was present as chaperone during physical examination

## 2021-02-09 ENCOUNTER — APPOINTMENT (OUTPATIENT)
Dept: ORTHOPEDIC SURGERY | Facility: CLINIC | Age: 66
End: 2021-02-09
Payer: COMMERCIAL

## 2021-02-09 PROCEDURE — 99072 ADDL SUPL MATRL&STAF TM PHE: CPT

## 2021-02-09 PROCEDURE — 20610 DRAIN/INJ JOINT/BURSA W/O US: CPT | Mod: RT

## 2021-02-09 PROCEDURE — 99214 OFFICE O/P EST MOD 30 MIN: CPT | Mod: 25

## 2021-02-09 NOTE — DISCUSSION/SUMMARY
[de-identified] : Bushra is a 65-year-old female comes in with mild improvement in pain of her right shoulder.  Her range of motion has improved significantly, she denies pain also but she still has stiffness on exam today. Today she elected for a second cortisone injection.  She tolerated the procedure well.  I will see her back in 6 to 8 weeks for clinical reassessment. She defers formal PT due to COVID concerns. She will perform the HEP given to her today more aggressively and if she continues to regress she is recommended to start formal PT 2-3x/week. She agrees with above plan all questions were answered.

## 2021-02-09 NOTE — HISTORY OF PRESENT ILLNESS
[7] : a minimum pain level of 7/10 [9] : a maximum pain level of 9/10 [Intermit.] : ~He/She~ states the symptoms seem to be intermittent [Physical Therapy] : relieved by physical therapy [Worsening] : worsening [___ mths] : [unfilled] month(s) ago [de-identified] : DELISA is a 66 year female who presents today for follow up for right shoulder pain secondary to adhesive capsulitis.  At last appt on 12/15/2020 she received a cortisone injection in R shoulder. She reports min-mod relief in R shoulder pain since last visit. Patient reports she discontinued PT after Dec 7 2020, as she did not feel comfortable going anymore due to having a pregnant daughter at home. She feels she has regressed since stopping PT.  She occasionally takes ibuprofen. Patient denies numbness and tingling to the extremities. She reports increase in shoulder pain when she tries to reach with her R arm.  [de-identified] : oral steroids

## 2021-02-09 NOTE — PHYSICAL EXAM
[de-identified] : Physical Exam:\par General: Well appearing, no acute distress, A&Ox3\par Neurologic: No focal deficits\par Head: NCAT without abrasions, lacerations, or ecchymosis to head, face, or scalp\par Eyes: No scleral icterus, no gross abnormalities\par Respiratory: Equal chest wall expansion bilaterally, no accessory muscle use\par Lymphatic: No lymphadenopathy palpated\par Skin: Warm and dry\par Psychiatric: Normal mood and affect\par \par C-Spine\par Palpation: Tenderness to paraspinal muscles and trapezius muscle\par ROM: side bending, symmetrical. Pain with extension and flexion of the neck.\par Reflexes: C5-7 [normal]\par \par Right Shoulder\par ·	Inspection/Palpation: No tenderness, no crepitus, no deformities\par ·	Range of Motion: no crepitus with ROM; Active ; ER at side 35; IR to back pocket with stiffness; Passive , ER at side 40, IR to back pocket\par Arc of Motion: ER to 5 degrees, IR to 5 degrees\par ·	Strength: forward elevation in scapular plane [4/5], internal rotation [4/5], external rotation [4/5], adduction [4/5] and abduction [4/5]\par ·	Stability: no joint instability on provocative testing\par ·	Tests: Ordonez test positive, Neer positive, positive drop arm test secondary to pain, bear hug test positive, Napolean sign POS, cross arm adduction positive, lift off sign positive, hornblowers sign POS, speeds test negative, Yergason's test negative, Beth's Active Compression test negative, whipple test positive, bicep's load II test negative\par \par Left Shoulder\par ·	Inspection/Palpation: no tenderness, swelling or deformities\par ·	Range of Motion: full and painless in all planes, no crepitus\par ·	Strength: forward elevation in scapular plane 5/5, internal rotation 5/5, external rotation 5/5, adduction 5/5 and abduction 5/5\par ·	Stability: no joint instability on provocative testing\par Tests: Ordonez test negative, Neer sign negative, negative drop arm test secondary to pain, bear hug test negative, Napolean sign negative, cross arm adduction negative, lift off sign positive, hornblowers sign negative, speeds test negative, Yergason's test negative, no bicipital groove tenderness, Beth's Active Compression test negative\par \par Elbow Exam\par \par Skin: Clean, dry, intact. No ecchymosis. No swelling. No palpable joint effusion.\par ROM: RIGHT 0-140, full supination/pronation.  LEFT 0-140, full supination/pronation.\par Painful ROM: None\par Tenderness: [No] medial epicondyle pain. No lateral epicondyle pain. [No] olecranon pain. No pain at radial head.\par Strength: 5/5 elbow flexion, 5/5 elbow extension, 5/5 supination, 5/5 pronation\par Stability: Stable to varus/valgus stress\par Vasc: 2+ radial pulse, <2s cap refill\par Sensation: In tact to light touch throughout\par Neuro: Negative tinels at ulnar canal, AIN/PIN/Ulnar nerve in tact to motor/sensation.\par \par Special Tests:\par Dorsiflexion Against Resistance: Negative\par Flexion of Wrist Against Resistance: NEG\par Resistance Against Pronation: NEG\par Resistance Against Supination: Negative\par Tinel's Sign Cubital Tunnel: Negative

## 2021-02-09 NOTE — PROCEDURE
[de-identified] : Injection: Right shoulder (Subacromial).\par Indication: Stiffness\par \par A discussion was had with the patient regarding this procedure and all questions were answered. All risks, benefits and alternatives were discussed. These included but were not limited to bleeding, infection, and allergic reaction. Alcohol was used to clean the skin, and betadine was used to sterilize and prep the area in the posterior aspect of the right shoulder. Ethyl chloride spray was then used as a topical anesthetic. A 22-gauge needle was used to inject 3cc 1% xylocaine, 2cc 0.25% bupivacaine and 1cc of 40mg/mL triamcinolone acetonide into the right subacromial space. A sterile bandage was then applied. The patient tolerated the procedure well and there were no complications.\par

## 2021-03-06 ENCOUNTER — LABORATORY RESULT (OUTPATIENT)
Age: 66
End: 2021-03-06

## 2021-03-08 ENCOUNTER — APPOINTMENT (OUTPATIENT)
Dept: RHEUMATOLOGY | Facility: CLINIC | Age: 66
End: 2021-03-08
Payer: COMMERCIAL

## 2021-03-08 VITALS
DIASTOLIC BLOOD PRESSURE: 70 MMHG | BODY MASS INDEX: 33.57 KG/M2 | OXYGEN SATURATION: 97 % | HEART RATE: 110 BPM | WEIGHT: 208 LBS | SYSTOLIC BLOOD PRESSURE: 122 MMHG | TEMPERATURE: 97 F

## 2021-03-08 DIAGNOSIS — M47.816 SPONDYLOSIS W/OUT MYELOPATHY OR RADICULOPATHY, LUMBAR REGION: ICD-10-CM

## 2021-03-08 DIAGNOSIS — M19.90 UNSPECIFIED OSTEOARTHRITIS, UNSPECIFIED SITE: ICD-10-CM

## 2021-03-08 DIAGNOSIS — I10 ESSENTIAL (PRIMARY) HYPERTENSION: ICD-10-CM

## 2021-03-08 PROCEDURE — 99072 ADDL SUPL MATRL&STAF TM PHE: CPT

## 2021-03-08 PROCEDURE — 99214 OFFICE O/P EST MOD 30 MIN: CPT

## 2021-03-08 NOTE — REVIEW OF SYSTEMS
[As Noted in HPI] : as noted in HPI [Fever] : no fever [Chills] : no chills [Eye Pain] : no eye pain [Earache] : no earache [Nosebleeds] : no nosebleeds [Chest Pain] : no chest pain [Shortness Of Breath] : no shortness of breath [Abdominal Pain] : no abdominal pain [Dysuria] : no dysuria [Confused] : no confusion [Suicidal] : not suicidal [Proptosis] : no proptosis [Easy Bleeding] : no tendency for easy bleeding

## 2021-03-08 NOTE — ASSESSMENT
[FreeTextEntry1] : 66-year-old female, here for follow up w/ OA knee w/ +ALE >1:1280 speckled w/ Allergist with mainly reports of some raynaud's symptoms, dry mouth at times without much other symptoms on CTD/SLE at this time. \par -reviewed labs 3/6/62023 w/ normal ESR; C3/C4 WNL; DS-DNA pending; CBC w/ mildly high WBC= 11.33 (from 13.73 from 16.07) was referred to Heme/Onc, Dr. Palmer and told likely was 2/2 to steroids & monitors there w/ referral provided as requested; UA micro w/ few oxalate crystals (no hx of kidney stone and avoid spinach); urine prot/creat ratio=0.1; parathyroid Nl; CPK normal; Nl vit D\par -labs from [facility] 5/1/2020 w/ RF normal; +ALE >1:1280 speckled\par -No synovitis or effusion on exam noted today and advised to monitor.\par -reviewed xray Lt knee 5/19/2020 read as normal \par \par +ALE >1:1280: repeat ALE 1:320 homo/speckled; mainly reports of some raynaud's symptoms, dry mouth at times\par -Raynaud's: no ulcers and reports of color changes to red and purple in the hands & ears w/ the cold. Discussed to keep warm and if not controlled can add Ca channel blocker if BP allows\par -Clinically without much other symptoms of CTD/lupus at this time and educated on symptoms to monitor for in detail if she evolves.\par -reviewed labs w/ disease activity markers 3/6/73233 stable w/ DS-DNA pending (neg in past); C3/C4 WNL; urine prot/creat ratio=0.1\par -lab as below w/ disease activity markers as below to be complete for monitoring on f/u \par \par hx of oral ulcers: once in a while & tend to be painful/ likely aphthous ulcers/ +HSV-1IgG\par -SLE ulcers tend to be painless \par -no genital or nasal ulcers at all\par \par LBP: intermittent; not much pain today \par -Reviewed xray LS spine 5/19/2020 read as mild degenerative spondylosis; SI intact \par -Advil PRN w/ food needed sparingly helps\par \par Dexa: 5/19/2020 read as normal reviewed \par \par HTN: TU=164/70, asymptomatic on lisinopril-HCTZ and monitors w/ PCP \par \par -educated on symptoms to monitor for in detail and alert us if any concerns.\par -knows to stay up to date on health maintenance w/ PCP\par -f/u in 9 mo w/ labs or sooner as needed \par \par

## 2021-03-08 NOTE — HISTORY OF PRESENT ILLNESS
[FreeTextEntry1] : 65 y/o F here for follow up w/ +ALE >1:1280 speckled w/ Allergist and referred here. \par States she had some swelling of her face and saw the Allergist and told all testing normal except her TSH was low w/ Nl free T4 and high +ALE. States she gets itching on arms w/ the cold and gets scratch marks from itching at times and discussing w/ her Derm.\par She states she was noting her ears turn bright red in the cold. States she notes her fingertips turn red/white in the cold/ rayanud's. \par Denies any swelling or redness or warmth of any joints.\par Denies any low grade fevers.\par Denies any malar rash or photosensitivity.\par States she has hx of painful oral ulcers/ ?aphthous once in a while and not now. She is [id number]+.\par Denies any nasal or genital ulcers. \par Denies any dry eyes.\par Notes dry mouth at times.\par Denies any infectious diarrhea or  symptoms at this time.\par Denies any hx of blood clots, no stroke; 1 healthy pregnancy without complications; no miscarriages. \par She had Dexa 5/2020 and will monitor q 2 yrs. \par \par  \par

## 2021-03-08 NOTE — PHYSICAL EXAM
[General Appearance - Alert] : alert [General Appearance - In No Acute Distress] : in no acute distress [Sclera] : the sclera and conjunctiva were normal [Extraocular Movements] : extraocular movements were intact [Outer Ear] : the ears and nose were normal in appearance [Neck Appearance] : the appearance of the neck was normal [Respiration, Rhythm And Depth] : normal respiratory rhythm and effort [Heart Rate And Rhythm] : heart rate was normal and rhythm regular [Heart Sounds] : normal S1 and S2 [Abdomen Soft] : soft [Abdomen Tenderness] : non-tender [Cervical Lymph Nodes Enlarged Anterior Bilaterally] : anterior cervical [Supraclavicular Lymph Nodes Enlarged Bilaterally] : supraclavicular [No CVA Tenderness] : no ~M costovertebral angle tenderness [No Spinal Tenderness] : no spinal tenderness [Motor Tone] : muscle strength and tone were normal [] : no rash [No Focal Deficits] : no focal deficits [Impaired Insight] : insight and judgment were intact [Mood] : the mood was normal [FreeTextEntry1] : no synovitis or effusion on exam noted today; good ROM in b/l shoulders

## 2021-03-09 ENCOUNTER — RESULT REVIEW (OUTPATIENT)
Age: 66
End: 2021-03-09

## 2021-03-15 LAB
25(OH)D3 SERPL-MCNC: 36 NG/ML
ALBUMIN SERPL ELPH-MCNC: 4.2 G/DL
ALP BLD-CCNC: 78 U/L
ALT SERPL-CCNC: 19 U/L
ANION GAP SERPL CALC-SCNC: 12 MMOL/L
APPEARANCE: ABNORMAL
AST SERPL-CCNC: 18 U/L
BASOPHILS # BLD AUTO: 0.07 K/UL
BASOPHILS NFR BLD AUTO: 0.6 %
BILIRUB SERPL-MCNC: 0.2 MG/DL
BILIRUBIN URINE: NEGATIVE
BLOOD URINE: NEGATIVE
BUN SERPL-MCNC: 19 MG/DL
C3 SERPL-MCNC: 155 MG/DL
C4 SERPL-MCNC: 31 MG/DL
CALCIUM SERPL-MCNC: 10.5 MG/DL
CALCIUM SERPL-MCNC: 10.5 MG/DL
CHLORIDE SERPL-SCNC: 102 MMOL/L
CK SERPL-CCNC: 106 U/L
CO2 SERPL-SCNC: 30 MMOL/L
COLOR: YELLOW
CREAT SERPL-MCNC: 0.89 MG/DL
CREAT SPEC-SCNC: 215 MG/DL
CREAT/PROT UR: 0.1 RATIO
DSDNA AB SER-ACNC: <12 IU/ML
EOSINOPHIL # BLD AUTO: 0.16 K/UL
EOSINOPHIL NFR BLD AUTO: 1.4 %
ERYTHROCYTE [SEDIMENTATION RATE] IN BLOOD BY WESTERGREN METHOD: 10 MM/HR
GLUCOSE QUALITATIVE U: NEGATIVE
GLUCOSE SERPL-MCNC: 93 MG/DL
HCT VFR BLD CALC: 41.4 %
HGB BLD-MCNC: 12.5 G/DL
IMM GRANULOCYTES NFR BLD AUTO: 0.4 %
KETONES URINE: NEGATIVE
LEUKOCYTE ESTERASE URINE: NEGATIVE
LYMPHOCYTES # BLD AUTO: 3.51 K/UL
LYMPHOCYTES NFR BLD AUTO: 31 %
MAN DIFF?: NORMAL
MCHC RBC-ENTMCNC: 29.2 PG
MCHC RBC-ENTMCNC: 30.2 GM/DL
MCV RBC AUTO: 96.7 FL
MONOCYTES # BLD AUTO: 0.67 K/UL
MONOCYTES NFR BLD AUTO: 5.9 %
NEUTROPHILS # BLD AUTO: 6.88 K/UL
NEUTROPHILS NFR BLD AUTO: 60.7 %
NITRITE URINE: NEGATIVE
PARATHYROID HORMONE INTACT: 27 PG/ML
PH URINE: 6
PLATELET # BLD AUTO: 413 K/UL
POTASSIUM SERPL-SCNC: 3.6 MMOL/L
PROT SERPL-MCNC: 6.9 G/DL
PROT UR-MCNC: 11 MG/DL
PROTEIN URINE: ABNORMAL
RBC # BLD: 4.28 M/UL
RBC # FLD: 16.7 %
SODIUM SERPL-SCNC: 143 MMOL/L
SPECIFIC GRAVITY URINE: 1.03
UROBILINOGEN URINE: ABNORMAL
WBC # FLD AUTO: 11.33 K/UL

## 2021-04-08 ENCOUNTER — APPOINTMENT (OUTPATIENT)
Dept: ORTHOPEDIC SURGERY | Facility: CLINIC | Age: 66
End: 2021-04-08
Payer: COMMERCIAL

## 2021-04-08 VITALS
BODY MASS INDEX: 33.43 KG/M2 | HEIGHT: 66 IN | SYSTOLIC BLOOD PRESSURE: 94 MMHG | OXYGEN SATURATION: 96 % | WEIGHT: 208 LBS | HEART RATE: 96 BPM | DIASTOLIC BLOOD PRESSURE: 53 MMHG

## 2021-04-08 DIAGNOSIS — M77.11 LATERAL EPICONDYLITIS, RIGHT ELBOW: ICD-10-CM

## 2021-04-08 PROCEDURE — 99072 ADDL SUPL MATRL&STAF TM PHE: CPT

## 2021-04-08 PROCEDURE — 99214 OFFICE O/P EST MOD 30 MIN: CPT

## 2021-04-08 NOTE — HISTORY OF PRESENT ILLNESS
[Worsening] : worsening [7] : a minimum pain level of 7/10 [9] : a maximum pain level of 9/10 [Intermit.] : ~He/She~ states the symptoms seem to be intermittent [Physical Therapy] : relieved by physical therapy [___ mths] : [unfilled] month(s) ago [de-identified] : DELISA is a 66 year female who presents today for follow up for right shoulder pain secondary to adhesive capsulitis.  At appt on 12/15/2020 she received a cortisone injection in R shoulder. She reports min-mod relief in R shoulder pain since last visit. Patient reports she discontinued PT after Dec 7 2020, as she did not feel comfortable going anymore due to having a pregnant daughter at home. She feels she has regressed since stopping PT.  She occasionally takes ibuprofen. Patient denies numbness and tingling to the extremities.\par \par Currently, patient reports no relief in R shoulder pain after second cortisone injection at last visit on 02/09/2021. She has not done any formal PT. She reports doing a HEP and going to see a chiropractor with no relief. She reports that now that she is fully vaccinated, she is willing to try going to formal PT in person to help her shoulder pain. [de-identified] : oral steroids

## 2021-04-08 NOTE — ADDENDUM
[FreeTextEntry1] : Documented by Edward Callahan acting as a scribe for Dr. Sullivan on 04/08/2021. \par \par All medical record entries made by the Scribe were at my, Dr. Sullivan's, direction and\par personally dictated by me on 04/08/2021. I have reviewed the chart and agree that the record\par accurately reflects my personal performance of the history, physical exam, procedure and imaging.

## 2021-04-08 NOTE — DISCUSSION/SUMMARY
[de-identified] : DELISA is a 66 year female who presents today for follow up for right shoulder pain secondary to adhesive capsulitis.  Injection provided limited relief. Patient reports she discontinued PT, as she did not feel comfortable going anymore due to having a pregnant daughter at home at the time. She feels she has regressed since stopping PT, but is willing to go back now. \par \par We discussed treatment options, including operative and nonoperative measures. She deferred injection at this time. Patient will start a Medrol Dose pack and begin physical therapy. She will FU in 6-8 weeks for repeat clinical assessment. All questions were answered and the patient verbalized understanding. The patient is in agreement with this treatment plan.

## 2021-04-08 NOTE — PHYSICAL EXAM
[de-identified] : Physical Exam:\par General: Well appearing, no acute distress, A&Ox3\par Neurologic: No focal deficits\par Head: NCAT without abrasions, lacerations, or ecchymosis to head, face, or scalp\par Eyes: No scleral icterus, no gross abnormalities\par Respiratory: Equal chest wall expansion bilaterally, no accessory muscle use\par Lymphatic: No lymphadenopathy palpated\par Skin: Warm and dry\par Psychiatric: Normal mood and affect\par \par C-Spine\par Palpation: Tenderness to paraspinal muscles and trapezius muscle\par ROM: side bending, symmetrical. Pain with extension and flexion of the neck.\par Reflexes: C5-7 [normal]\par \par Right Shoulder\par ·	Inspection/Palpation: No tenderness, no crepitus, no deformities\par ·	Range of Motion: no crepitus with ROM; Active ; ER at side 35; IR to back pocket with stiffness; Passive , ER at side 40, IR to back pocket\par Arc of Motion: ER to 5 degrees, IR to 5 degrees\par ·	Strength: forward elevation in scapular plane [4/5], internal rotation [4/5], external rotation [4/5], adduction [4/5] and abduction [4/5]\par ·	Stability: no joint instability on provocative testing\par ·	Tests: Ordonez test positive, Neer positive, positive drop arm test secondary to pain, bear hug test positive, Napolean sign POS, cross arm adduction positive, lift off sign positive, hornblowers sign POS, speeds test negative, Yergason's test negative, Beth's Active Compression test negative, whipple test positive, bicep's load II test negative\par \par Left Shoulder\par ·	Inspection/Palpation: no tenderness, swelling or deformities\par ·	Range of Motion: full and painless in all planes, no crepitus\par ·	Strength: forward elevation in scapular plane 5/5, internal rotation 5/5, external rotation 5/5, adduction 5/5 and abduction 5/5\par ·	Stability: no joint instability on provocative testing\par Tests: Ordonez test negative, Neer sign negative, negative drop arm test secondary to pain, bear hug test negative, Napolean sign negative, cross arm adduction negative, lift off sign positive, hornblowers sign negative, speeds test negative, Yergason's test negative, no bicipital groove tenderness, Beth's Active Compression test negative\par \par Elbow Exam\par \par Skin: Clean, dry, intact. No ecchymosis. No swelling. No palpable joint effusion.\par ROM: RIGHT 0-140, full supination/pronation.  LEFT 0-140, full supination/pronation.\par Painful ROM: None\par Tenderness: [No] medial epicondyle pain. No lateral epicondyle pain. [No] olecranon pain. No pain at radial head.\par Strength: 5/5 elbow flexion, 5/5 elbow extension, 5/5 supination, 5/5 pronation\par Stability: Stable to varus/valgus stress\par Vasc: 2+ radial pulse, <2s cap refill\par Sensation: In tact to light touch throughout\par Neuro: Negative tinels at ulnar canal, AIN/PIN/Ulnar nerve in tact to motor/sensation.\par \par Special Tests:\par Dorsiflexion Against Resistance: Negative\par Flexion of Wrist Against Resistance: NEG\par Resistance Against Pronation: NEG\par Resistance Against Supination: Negative\par Tinel's Sign Cubital Tunnel: Negative

## 2021-06-18 ENCOUNTER — APPOINTMENT (OUTPATIENT)
Dept: ORTHOPEDIC SURGERY | Facility: CLINIC | Age: 66
End: 2021-06-18
Payer: COMMERCIAL

## 2021-06-18 DIAGNOSIS — R51.9 HEADACHE, UNSPECIFIED: ICD-10-CM

## 2021-06-18 DIAGNOSIS — M54.81 OCCIPITAL NEURALGIA: ICD-10-CM

## 2021-06-18 PROCEDURE — 99214 OFFICE O/P EST MOD 30 MIN: CPT

## 2021-06-18 PROCEDURE — 99072 ADDL SUPL MATRL&STAF TM PHE: CPT

## 2021-06-18 NOTE — PHYSICAL EXAM
[de-identified] : Physical Exam:\par General: Well appearing, no acute distress, A&Ox3\par Neurologic: No focal deficits\par Head: NCAT without abrasions, lacerations, or ecchymosis to head, face, or scalp\par Eyes: No scleral icterus, no gross abnormalities\par Respiratory: Equal chest wall expansion bilaterally, no accessory muscle use\par Lymphatic: No lymphadenopathy palpated\par Skin: Warm and dry\par Psychiatric: Normal mood and affect\par \par C-Spine\par Palpation: Tenderness to paraspinal muscles and trapezius muscle\par ROM: side bending, symmetrical. Pain with extension and flexion of the neck.\par Reflexes: C5-7 [normal]\par \par Right Shoulder\par ·	Inspection/Palpation: No tenderness, no crepitus, no deformities\par ·	Range of Motion: no crepitus with ROM; Active ; ER at side 25; IR to back pocket with stiffness; Passive , ER at side 30, IR to back pocket\par Arc of Motion: ER to 5 degrees, IR to 5 degrees\par ·	Strength: forward elevation in scapular plane [4/5], internal rotation [4/5], external rotation [4/5], adduction [4/5] and abduction [4/5]\par ·	Stability: no joint instability on provocative testing\par ·	Tests: Ordonez test positive, Neer positive, positive drop arm test secondary to pain, bear hug test positive, Napolean sign POS, cross arm adduction positive, lift off sign positive, hornblowers sign POS, speeds test negative, Yergason's test negative, Beth's Active Compression test negative, whipple test positive, bicep's load II test negative\par \par Left Shoulder\par ·	Inspection/Palpation: no tenderness, swelling or deformities\par ·	Range of Motion: full and painless in all planes, no crepitus\par ·	Strength: forward elevation in scapular plane 5/5, internal rotation 5/5, external rotation 5/5, adduction 5/5 and abduction 5/5\par ·	Stability: no joint instability on provocative testing\par Tests: Ordonez test negative, Neer sign negative, negative drop arm test secondary to pain, bear hug test negative, Napolean sign negative, cross arm adduction negative, lift off sign positive, hornblowers sign negative, speeds test negative, Yergason's test negative, no bicipital groove tenderness, Beth's Active Compression test negative\par \par Elbow Exam\par \par Skin: Clean, dry, intact. No ecchymosis. No swelling. No palpable joint effusion.\par ROM: RIGHT 0-140, full supination/pronation.  LEFT 0-140, full supination/pronation.\par Painful ROM: None\par Tenderness: [No] medial epicondyle pain. No lateral epicondyle pain. [No] olecranon pain. No pain at radial head.\par Strength: 5/5 elbow flexion, 5/5 elbow extension, 5/5 supination, 5/5 pronation\par Stability: Stable to varus/valgus stress\par Vasc: 2+ radial pulse, <2s cap refill\par Sensation: In tact to light touch throughout\par Neuro: Negative tinels at ulnar canal, AIN/PIN/Ulnar nerve in tact to motor/sensation.\par \par Special Tests:\par Dorsiflexion Against Resistance: Negative\par Flexion of Wrist Against Resistance: NEG\par Resistance Against Pronation: NEG\par Resistance Against Supination: Negative\par Tinel's Sign Cubital Tunnel: Negative

## 2021-06-18 NOTE — DISCUSSION/SUMMARY
[de-identified] : DELISA is a 66 year female who presents today for follow up for right shoulder pain secondary to adhesive capsulitis.  At appt on 12/15/2020 she received a cortisone injection to R shoulder. Patient received second cortisone injection to R shoulder on 02/09/2021. At that time due to COVID she was not able to perform PT. From her last appointment she started PT and took a medrol dose pack.\par \par Currently, she reports slow improvement in R shoulder pain with PT 2x/week. She admits to additional R side neck pain, that she reports is improved with PT massage and manual release. No other complaints at this time. She felt more improved with regards to her ROM around the time of the medrol dose pack and has felt some regression. \par \par We had a thorough discussion regarding the nature of her pain, the pathophysiology, as well as all treatment options. Based on her exam she does have slight decrease in ROM since her last evaluation. No MRI performed to this shoulder. Due to failed conservative care with two cortisone injections, PT 2x/week alongside HEP for greater than 2 months, medrol dose pack and oral NSAIDs, an MRI is indicated. We will see her for reassessment to review the results. She will continue PT and HEP until then. She will also take Prednisone as prescribed. She agrees with the above plan and all questions were answered.\par \par The above was discussed with Dr Sullivan who agrees with this plan.\par

## 2021-06-18 NOTE — HISTORY OF PRESENT ILLNESS
[Improving] : improving [___ mths] : [unfilled] month(s) ago [7] : a minimum pain level of 7/10 [9] : a maximum pain level of 9/10 [Intermit.] : ~He/She~ states the symptoms seem to be intermittent [Physical Therapy] : relieved by physical therapy [de-identified] : DELISA is a 66 year female who presents today for follow up for right shoulder pain secondary to adhesive capsulitis.  At appt on 12/15/2020 she received a cortisone injection to R shoulder. Patient received second cortisone injection to R shoulder on 02/09/2021. At that time due to COVID she was not able to perform PT. From her last appointment she started PT and took a medrol dose pack.\par \par Currently, she reports slow improvement in R shoulder pain with PT 2x/week. She admits to additional R side neck pain, that she reports is improved with PT massage and manual release. No other complaints at this time.  [de-identified] : oral steroids

## 2021-06-29 ENCOUNTER — APPOINTMENT (OUTPATIENT)
Dept: MRI IMAGING | Facility: CLINIC | Age: 66
End: 2021-06-29
Payer: COMMERCIAL

## 2021-06-29 ENCOUNTER — OUTPATIENT (OUTPATIENT)
Dept: OUTPATIENT SERVICES | Facility: HOSPITAL | Age: 66
LOS: 1 days | End: 2021-06-29

## 2021-06-29 ENCOUNTER — APPOINTMENT (OUTPATIENT)
Dept: ORTHOPEDIC SURGERY | Facility: CLINIC | Age: 66
End: 2021-06-29

## 2021-06-29 ENCOUNTER — RESULT REVIEW (OUTPATIENT)
Age: 66
End: 2021-06-29

## 2021-06-29 DIAGNOSIS — Z98.890 OTHER SPECIFIED POSTPROCEDURAL STATES: Chronic | ICD-10-CM

## 2021-06-29 DIAGNOSIS — Z90.49 ACQUIRED ABSENCE OF OTHER SPECIFIED PARTS OF DIGESTIVE TRACT: Chronic | ICD-10-CM

## 2021-06-29 DIAGNOSIS — Z90.710 ACQUIRED ABSENCE OF BOTH CERVIX AND UTERUS: Chronic | ICD-10-CM

## 2021-06-29 DIAGNOSIS — M75.01 ADHESIVE CAPSULITIS OF RIGHT SHOULDER: ICD-10-CM

## 2021-06-29 PROCEDURE — 73221 MRI JOINT UPR EXTREM W/O DYE: CPT | Mod: 26,RT

## 2021-06-30 ENCOUNTER — APPOINTMENT (OUTPATIENT)
Dept: ORTHOPEDIC SURGERY | Facility: CLINIC | Age: 66
End: 2021-06-30
Payer: COMMERCIAL

## 2021-06-30 VITALS
HEIGHT: 66 IN | WEIGHT: 208 LBS | SYSTOLIC BLOOD PRESSURE: 112 MMHG | DIASTOLIC BLOOD PRESSURE: 80 MMHG | HEART RATE: 118 BPM | BODY MASS INDEX: 33.43 KG/M2

## 2021-06-30 PROCEDURE — 99214 OFFICE O/P EST MOD 30 MIN: CPT | Mod: 25

## 2021-06-30 PROCEDURE — 20610 DRAIN/INJ JOINT/BURSA W/O US: CPT | Mod: LT

## 2021-06-30 PROCEDURE — 99072 ADDL SUPL MATRL&STAF TM PHE: CPT

## 2021-06-30 PROCEDURE — 73562 X-RAY EXAM OF KNEE 3: CPT | Mod: LT

## 2021-06-30 NOTE — HISTORY OF PRESENT ILLNESS
[Pain Location] : pain [6] : a current pain level of 6/10 [2] : a minimum pain level of 2/10 [8] : a maximum pain level of 8/10 [Bending] : worsened by bending [Walking] : worsened by walking [Rest] : relieved by rest [de-identified] : 67 y/o F presents with left knee pain. The pain started a couple of months ago without injury/trauma. The pain is in the medial aspect of the knee. She has pain with walking, stairs, and bending. She is walking with a limp. She is also s/p right TKA 7/30/2019. Pt still has achiness in her right knee. Otherwise, the pt is doing well.

## 2021-06-30 NOTE — DISCUSSION/SUMMARY
[Medication Risks Reviewed] : Medication risks reviewed [de-identified] : 67 y/o F with moderate medial and patellofemoral osteoarthritis of the left knee. Conservative therapy and surgical options discussed in detail with the patient. The patient is not yet a candidate for a left TKA. Her pain started a couple of months ago which has been affecting her ADLs. She agrees to begin with conservative therapies. Pt opted to receive a cortisone injection in the left knee today and tolerated it well. F/u with us in three months for repeat cortisone injections if needed. If she does not have relief with the cortisone injection, she will get an MRI of the left knee. She is also s/p right TKA 7/30/2019. Overall the patient is doing well with the right knee, but still has achiness in the right knee. We explained that this is normal and non concerning. \par \par The patient is a 66 year individual with end stage arthritis of their right knee joint. Based upon the patient's continued symptoms and failure to respond to conservative treatment, pt successfully completed right total knee arthroplasty. A long discussion took place with the patient describing what a total joint replacement is and what the procedure would entail. A total knee arthroplasty model, similar to the implant that was used during the operation, was utilized to demonstrate and to discuss the various bearing surfaces of the implants. The hospitalization and post-operative care and rehabilitation were also discussed. The use of perioperative antibiotics and DVT prophylaxis were discussed. The risk, benefits and alternatives to a surgical intervention were discussed at length with the patient. The patient was also advised of risks related to the medical comorbidities, elevated body mass index (BMI), and smoking where applicable. We discussed how to reduce modifiable risk factors and encouraged smoking cessation were applicable.. A lengthy discussion took place to review the most common complications including but not limited to: deep vein thrombosis, pulmonary embolus, heart attack, stroke, infection, wound breakdown, numbness, damage to nerves, tendon, muscles, arteries or other blood vessels, death and other possible complications from anesthesia. The patient was told that we will take steps to minimize these risks by using sterile technique, antibiotics and DVT prophylaxis when appropriate and follow the patient postoperatively in the office setting to monitor progress. The possibility of recurrent pain, no improvement in pain and actual worsening of pain were also discussed with the patient.\par The discharge plan of care focused on the patient going home following surgery. The patient was encouraged to make the necessary arrangements to have someone stay with them when they are discharged home. Following discharge, a home care nurse was to the patient. The home care nurse would open the patient’s home care case and request home physical therapy services. Home physical therapy was to commence following discharge provided it was appropriate and covered by the health insurance benefit plan. \par The benefits of surgery were discussed with the patient including the potential for improving her current clinical condition through operative intervention. Alternatives to surgical intervention including continued conservative management were also discussed in detail. All questions were answered to the satisfaction of the patient. The treatment plan of care, as well as a model of a total knee arthroplasty equivalent to the one that will be used for their total joint replacement, was shared with the patient. The patient agreed to the plan of care as well as the use of implants in their total joint replacement.

## 2021-06-30 NOTE — END OF VISIT
[FreeTextEntry3] : I, Vishnu Diego, acted solely as a scribe for Dr. Karlos De La Vega on this date 06/30/2021.

## 2021-06-30 NOTE — PROCEDURE
[de-identified] : I injected the patient's left knee today with cortisone.\par \par I discussed at length with the patient the planned steroid and lidocaine injection. The risks, benefits, convalescence and alternatives were reviewed. The possible side effects discussed included but were not limited to: pain, swelling, heat, bleeding, and redness. Symptoms are generally mild but if they are extensive then contact the office. Giving pain relievers by mouth such as NSAIDs or Tylenol can generally treat the reactions to steroid and lidocaine. Rare cases of infection have been noted. Rash, hives and itching may occur post injection. If you have muscle pain or cramps, flushing and or swelling of the face, rapid heart beat, nausea, dizziness, fever, chills, headache, difficulty breathing, swelling in the arms or legs, or have a prickly feeling of your skin, contact a health care provider immediately. Following this discussion, the knee was prepped with Alcohol and under sterile condition the 80 mg Depo-Medrol and 6 cc Lidocaine injection was performed with a 20 gauge needle through a superolateral injection site. The needle was introduced into the joint, aspiration was performed to ensure intra-articular placement and the medication was injected. Upon withdrawal of the needle the site was cleaned with alcohol and a band aid applied. The patient tolerated the injection well and there were no adverse effects. Post injection instructions included no strenuous activity for 24 hours, cryotherapy and if there are any adverse effects to contact the office.

## 2021-06-30 NOTE — PHYSICAL EXAM
[LE] : Sensory: Intact in bilateral lower extremities [ALL] : dorsalis pedis, posterior tibial, femoral, popliteal, and radial 2+ and symmetric bilaterally [Normal] : Alert and in no acute distress [Poor Appearance] : well-appearing [de-identified] : GENERAL APPEARANCE: Well nourished and hydrated, pleasant, alert, and oriented x 3. Appears their stated age. \par HEENT: Normocephalic, extraocular eye motion intact. Nasal septum midline. Oral cavity clear. External auditory canal clear. \par RESPIRATORY: Breath sounds clear and audible in all lobes. No wheezing, No accessory muscle use.\par CARDIOVASCULAR: No apparent abnormalities. No lower leg edema. No varicosities. Pedal pulses are palpable.\par NEUROLOGIC: Sensation is normal, no muscle weakness in the upper or lower extremities.\par DERMATOLOGIC: No apparent skin lesions, moist, warm, no rash.\par SPINE: Cervical spine appears normal and moves freely; thoracic spine appears normal and moves freely; lumbosacral spine appears normal and moves freely, normal, nontender.\par MUSCULOSKELETAL: Hands, wrists, and elbows are normal and move freely, shoulders are normal and move freely.  [de-identified] : Left knee exam shows medial joint line tenderness, ROM 0-100 degrees\par Right knee exam shows healed incision with no sign of infection, ROM 0-120 degrees [de-identified] : 3V xray of the left knee done in the office today and reviewed by Dr. Karlos De La Vega demonstrates moderate medial and patellofemoral osteoarthritis

## 2021-07-19 ENCOUNTER — APPOINTMENT (OUTPATIENT)
Dept: GYNECOLOGIC ONCOLOGY | Facility: CLINIC | Age: 66
End: 2021-07-19
Payer: COMMERCIAL

## 2021-07-19 VITALS
OXYGEN SATURATION: 97 % | SYSTOLIC BLOOD PRESSURE: 112 MMHG | DIASTOLIC BLOOD PRESSURE: 78 MMHG | RESPIRATION RATE: 16 BRPM | WEIGHT: 209 LBS | HEIGHT: 66 IN | BODY MASS INDEX: 33.59 KG/M2 | HEART RATE: 103 BPM

## 2021-07-19 PROCEDURE — 99072 ADDL SUPL MATRL&STAF TM PHE: CPT

## 2021-07-19 PROCEDURE — 99214 OFFICE O/P EST MOD 30 MIN: CPT

## 2021-07-19 NOTE — REASON FOR VISIT
[FreeTextEntry1] : [facility]\par \par [facility] [phone] at [address] [place] [id number]\par

## 2021-07-19 NOTE — PHYSICAL EXAM
[Absent] : Adnexa(ae): Absent [Normal] : Bimanual Exam: Normal [de-identified] : Daisy Hyman was present as chaperone during examination.

## 2021-07-19 NOTE — HISTORY OF PRESENT ILLNESS
[FreeTextEntry1] : This 67y/o with history of stage 1A grade 1 endometrial cancer (no myometrial invasion) s/p RA TLH BSO b/l pelvic and para-aortic sentinel lymph node dissection, CHIOMA pineda on 03/27/18 presents for surveillance exam today. She denies any pelvic pain or VB. Reports having a colonoscopy in spring 2021-polyps removed. She gets frequent colonoscopies due to family history of colon cancer and personal history of polyps. DEXA on 5/20/20 was normal. She reports having a mammogram in the Spring 2021, ordered by Dr. Payton Danielson -needs to have a follow up mammo due to density of breasts. \par \par  \par

## 2021-08-04 ENCOUNTER — APPOINTMENT (OUTPATIENT)
Dept: ORTHOPEDIC SURGERY | Facility: CLINIC | Age: 66
End: 2021-08-04
Payer: COMMERCIAL

## 2021-08-04 PROCEDURE — 99441: CPT

## 2021-08-04 NOTE — HISTORY OF PRESENT ILLNESS
[Home] : at home, [unfilled] , at the time of the visit. [Medical Office: ([facility])___] : at the medical office located in  [de-identified] : I called the patient today to review her left knee pain.  I had seen her the end of June we administered a cortisone injection.  She had said it helped tremendously for about a week but then the pain has recurred.  She is having progressive left knee pain..\par \par She is taking Aleve twice a day she does take some ibuprofen in between.  She has upcoming neurosurgery scheduled for a pituitary tumor.\par She does note that she is can have to stop her anti-inflammatories around the time of surgery and she is concerned that her pain in her knee is is gone 8 worsen even further.\par \par She is a candidate in the future for left knee replacement.  I suspect she is having a stress reaction.  I think she could benefit from a repeat injection.  I also prescribed diclofenac as an oral anti-inflammatory to see if this helps better than the Naprosyn.\par \par We will bring her in shortly for repeat steroid injection

## 2021-08-04 NOTE — DISCUSSION/SUMMARY
[Medication Risks Reviewed] : Medication risks reviewed [de-identified] : I called the patient today to review her left knee pain.  I had seen her the end of June we administered a cortisone injection.  She had said it helped tremendously for about a week but then the pain has recurred.  She is having progressive left knee pain..\par \par She is taking Aleve twice a day she does take some ibuprofen in between.  She has upcoming neurosurgery scheduled for a pituitary tumor.\par She does note that she is can have to stop her anti-inflammatories around the time of surgery and she is concerned that her pain in her knee is is gone 8 worsen even further.\par \par She is a candidate in the future for left knee replacement.  I suspect she is having a stress reaction.  I think she could benefit from a repeat injection.  I also prescribed diclofenac as an oral anti-inflammatory to see if this helps better than the Naprosyn.\par \par We will bring her in shortly for repeat steroid injection

## 2021-08-09 ENCOUNTER — APPOINTMENT (OUTPATIENT)
Dept: ORTHOPEDIC SURGERY | Facility: CLINIC | Age: 66
End: 2021-08-09
Payer: COMMERCIAL

## 2021-08-09 VITALS
HEART RATE: 114 BPM | DIASTOLIC BLOOD PRESSURE: 63 MMHG | SYSTOLIC BLOOD PRESSURE: 94 MMHG | BODY MASS INDEX: 33.59 KG/M2 | WEIGHT: 209 LBS | HEIGHT: 66 IN

## 2021-08-09 PROCEDURE — 99214 OFFICE O/P EST MOD 30 MIN: CPT | Mod: 25

## 2021-08-09 PROCEDURE — 20610 DRAIN/INJ JOINT/BURSA W/O US: CPT | Mod: RT

## 2021-08-09 NOTE — DISCUSSION/SUMMARY
[de-identified] : DELISA is a 66 year female who presents today for follow up for right shoulder pain secondary to adhesive capsulitis. She presents today for R shoulder MRI review. \par \par We had a thorough discussion regarding the nature of her pain, the pathophysiology, as well as all treatment options. Based off of this and her clinical exam I believe her primary complaint to be adhesive capsulitis of which conservative measures are indicated. We discussed the 3 stages of adhesive capsulitis. Stage I is the inflammatory stage where she is in pain. States she was a frozen stage where she has limited range of motion but minimal pain. Stage III is the thawing stage where her range of motion starts to increase.\par \par Pt due to her acute pain and stiffness elected for a corticosteroid injection at today's visit and tolerated the procedure well. She should take it easy for the next 2-3 days while icing the joint and they may start PT in 1 week from today, 2x/week x 6-8 weeks. At that time we will see her for clinical reassessment. Pt agrees to the above plan and all questions were answered. \par  \par

## 2021-08-09 NOTE — PHYSICAL EXAM
[de-identified] : Physical Exam:\par General: Well appearing, no acute distress, A&Ox3\par Neurologic: No focal deficits\par Head: NCAT without abrasions, lacerations, or ecchymosis to head, face, or scalp\par Eyes: No scleral icterus, no gross abnormalities\par Respiratory: Equal chest wall expansion bilaterally, no accessory muscle use\par Lymphatic: No lymphadenopathy palpated\par Skin: Warm and dry\par Psychiatric: Normal mood and affect\par \par C-Spine\par Palpation: Tenderness to paraspinal muscles and trapezius muscle\par ROM: side bending, symmetrical. Pain with extension and flexion of the neck.\par Reflexes: C5-7 [normal]\par \par Right Shoulder\par ·	Inspection/Palpation: No tenderness, no crepitus, no deformities\par ·	Range of Motion: no crepitus with ROM; Active ; ER at side 30; IR to back pocket with stiffness; Passive  w/ resistance and pain , ER at side 35, IR to back pocket\par Arc of Motion: ER to 10 degrees, IR to 5 degrees\par ·	Strength: forward elevation in scapular plane [4/5], internal rotation [4/5], external rotation [4/5], adduction [4/5] and abduction [4/5]\par ·	Stability: no joint instability on provocative testing\par ·	Tests: Ordonez test positive, Neer positive, positive drop arm test secondary to pain, bear hug test positive, Napolean sign POS, cross arm adduction positive, lift off sign positive, hornblowers sign POS, speeds test negative, Yergason's test negative, Beth's Active Compression test negative, whipple test positive, bicep's load II test negative\par \par Left Shoulder\par ·	Inspection/Palpation: no tenderness, swelling or deformities\par ·	Range of Motion: full and painless in all planes, no crepitus\par ·	Strength: forward elevation in scapular plane 5/5, internal rotation 5/5, external rotation 5/5, adduction 5/5 and abduction 5/5\par ·	Stability: no joint instability on provocative testing\par Tests: Ordonez test negative, Neer sign negative, negative drop arm test secondary to pain, bear hug test negative, Napolean sign negative, cross arm adduction negative, lift off sign positive, hornblowers sign negative, speeds test negative, Yergason's test negative, no bicipital groove tenderness, Beth's Active Compression test negative\par \par Elbow Exam\par \par Skin: Clean, dry, intact. No ecchymosis. No swelling. No palpable joint effusion.\par ROM: RIGHT 0-140, full supination/pronation.  LEFT 0-140, full supination/pronation.\par Painful ROM: None\par Tenderness: [No] medial epicondyle pain. No lateral epicondyle pain. [No] olecranon pain. No pain at radial head.\par Strength: 5/5 elbow flexion, 5/5 elbow extension, 5/5 supination, 5/5 pronation\par Stability: Stable to varus/valgus stress\par Vasc: 2+ radial pulse, <2s cap refill\par Sensation: In tact to light touch throughout\par Neuro: Negative tinels at ulnar canal, AIN/PIN/Ulnar nerve in tact to motor/sensation.\par \par Special Tests:\par Dorsiflexion Against Resistance: Negative\par Flexion of Wrist Against Resistance: NEG\par Resistance Against Pronation: NEG\par Resistance Against Supination: Negative\par Tinel's Sign Cubital Tunnel: Negative [de-identified] : EXAM:  MR SHOULDER RT\par \par \par PROCEDURE DATE:  06/29/2021\par \par IMPRESSION:\par \par Findings consistent with adhesive capsulitis.\par \par Mild tendinosis of the supraspinatus tendon with mild to moderate articular surface tearing along the anteriormost insertional fibers.

## 2021-08-09 NOTE — ADDENDUM
[FreeTextEntry1] : Documented by Cesar Freire acting as a scribe for Dr. Sullivan on 08/09/2021. \par \par All medical record entries made by the Scribe were at my, Dr. Sullivan's, direction and\par personally dictated by me on 08/09/2021. I have reviewed the chart and agree that the record\par accurately reflects my personal performance of the history, physical exam, procedure and imaging.

## 2021-08-09 NOTE — PROCEDURE
[de-identified] : Injection: Right shoulder (Subacromial). \par Indication: Adhesive capsulitis \par \par A discussion was had with the patient regarding this procedure and all questions were answered. All risks, benefits and alternatives were discussed. These included but were not limited to bleeding, infection, and allergic reaction. Alcohol was used to clean the skin, and betadine was used to sterilize and prep the area in the posterior aspect of the right shoulder. Ethyl chloride spray was then used as a topical anesthetic. A 22-gauge needle was used to inject 3cc 1% xylocaine, 2cc 0.25% bupivacaine and 1cc of 40mg/mL triamcinolone acetonide into the right subacromial space. A sterile bandage was then applied. The patient tolerated the procedure well and there were no complications.

## 2021-08-09 NOTE — HISTORY OF PRESENT ILLNESS
[Improving] : improving [___ mths] : [unfilled] month(s) ago [7] : a minimum pain level of 7/10 [9] : a maximum pain level of 9/10 [Intermit.] : ~He/She~ states the symptoms seem to be intermittent [Physical Therapy] : relieved by physical therapy [de-identified] : DELISA is a 66 year female who presents today for follow up for right shoulder pain secondary to adhesive capsulitis. She presents today for R shoulder MRI review. MRI reveals: \par \par Findings consistent with adhesive capsulitis.\par \par Mild tendinosis of the supraspinatus tendon with mild to moderate articular surface tearing along the anteriormost insertional fibers. [de-identified] : oral steroids

## 2021-08-13 ENCOUNTER — APPOINTMENT (OUTPATIENT)
Dept: ORTHOPEDIC SURGERY | Facility: CLINIC | Age: 66
End: 2021-08-13
Payer: COMMERCIAL

## 2021-08-13 VITALS
BODY MASS INDEX: 33.59 KG/M2 | WEIGHT: 209 LBS | HEIGHT: 66 IN | DIASTOLIC BLOOD PRESSURE: 74 MMHG | HEART RATE: 109 BPM | SYSTOLIC BLOOD PRESSURE: 113 MMHG

## 2021-08-13 DIAGNOSIS — D35.2 BENIGN NEOPLASM OF PITUITARY GLAND: ICD-10-CM

## 2021-08-13 PROCEDURE — 20610 DRAIN/INJ JOINT/BURSA W/O US: CPT | Mod: LT

## 2021-08-13 PROCEDURE — 99214 OFFICE O/P EST MOD 30 MIN: CPT | Mod: 25

## 2021-08-13 NOTE — PROCEDURE
[de-identified] : pt Received a left knee cortisone injection.\par \par I discussed at length with the patient the planned steroid and lidocaine injection for primary osteoarthritis. The risks, benefits, convalescence and alternatives were reviewed and pt consented for injection. The possible side effects discussed included but were not limited to: pain, swelling, heat, bleeding, and redness. Symptoms are generally mild but if they are extensive then contact the office. Giving pain relievers by mouth such as NSAIDs or Tylenol can generally treat the reactions to steroid and lidocaine. Rare cases of infection have been noted. Rash, hives and itching may occur post injection. If you have muscle pain or cramps, flushing and or swelling of the face, rapid heart beat, nausea, dizziness, fever, chills, headache, difficulty breathing, swelling in the arms or legs, or have a prickly feeling of your skin, contact a health care provider immediately. Following this discussion, the knee was prepped with Alcohol and under sterile condition the 80 mg Depo-Medrol and 6 cc Lidocaine injection was performed with a 20 gauge needle through a superolateral injection site. The needle was introduced into the joint, aspiration was performed to ensure intra-articular placement and the medication was injected. Upon withdrawal of the needle the site was cleaned with alcohol and a band aid applied. The patient tolerated the injection well and there were no adverse effects. Post injection instructions included no strenuous activity for 24 hours, cryotherapy and if there are any adverse effects to contact the office.\par

## 2021-08-13 NOTE — PHYSICAL EXAM
[LE] : Sensory: Intact in bilateral lower extremities [ALL] : dorsalis pedis, posterior tibial, femoral, popliteal, and radial 2+ and symmetric bilaterally [Antalgic] : not antalgic [de-identified] : GENERAL APPEARANCE:   Well nourished and hydrated, pleasant, alert, and oriented x 4.  \par CARDIOVASCULAR:   No apparent abnormalities.  No lower leg edema.  No varicosities.  Pedal pulses are palpable.\par RESPIRATORY: Breathe sound clear and audible in all lobes. No wheezing, No accessory muscle use.\par NEUROLOGIC:   Sensation is normal, no muscle weakness in the upper or lower extremities.\par DERMATOLOGIC:   No apparent skin lesions, moist, warm, no rash.\par SPINE:   Cervical spine appears normal and moves freely; thoracic spine appears normal and moves freely; lumbosacral spine appears normal and moves freely, normal, nontender.\par MUSCULOSKELETAL:   Hands, wrists, and elbows are normal and move freely, shoulders are normal and move freely. \par  [de-identified] : Examination of left knee demonstrate well-healed incision. Smooth painless range of motion of 0-120 degree\par \par . The surgical incision site(s) healed, not erythematous, absent of discharge, not swollen and not dehisced. Additional findings included an unremarkable neurological exam, peripheral vascular exam normal and maneuvers demonstrated a negative Tasneem's sign. \par

## 2021-08-13 NOTE — DISCUSSION/SUMMARY
[de-identified] : Medication risks reviewed. 65 y/o F with moderate medial and patellofemoral osteoarthritis of the left knee. Conservative therapy and surgical options discussed in detail with the patient. The patient is not yet a candidate for a left TKA. Her pain started a couple of months ago which has been affecting her ADLs. She has severe rebound pain after a cortisone injection. she inquires about MRI and ordered left knee MRI today. she is interested in left TKA   pt opted in for another cortisone injection in the left knee today and tolerated it well. F/u with us in three months for repeat cortisone injections if needed.\par I am willing to give her tramadol #10 before her pituitary sx and she will check with neurosurgeon.\par  She is also s/p right TKA 7/30/2019. Overall the patient is doing well with the right knee.\par \par

## 2021-08-13 NOTE — HISTORY OF PRESENT ILLNESS
[Pain Location] : pain [Worsening] : worsening [5] : a current pain level of 5/10 [7] : a maximum pain level of 7/10 [de-identified] : pt is here for f/u of  left knee pain. I had seen her the end of June we administered a cortisone injection. She had said it helped tremendously for about a week but then the pain has recurred. She has severe knee pain. pt called office so we started diclofenac which make her able to wt bear but still painful.\par \par She is taking diclofenac twice a day she does take some ibuprofen in between. She has upcoming neurosurgery scheduled for a pituitary tumor.\par she won't be able to take NSAIDs 1 week before her sx, she worries about what to do with pain \par \par She does note that she is can have to stop her anti-inflammatories around the time of surgery and she is concerned that her pain in her knee is is gone 8 worsen even further.\par \par She is a candidate in the future for left knee replacement. I suspect she is having a stress reaction. I think she could benefit from a repeat injection. I also prescribed diclofenac as an oral anti-inflammatory to see if this helps better than the Naprosyn.\par \par she is interested in repeat steroid injection \par she is s/p rihgt TKA in 2017 with good outcome\par

## 2021-08-18 ENCOUNTER — OUTPATIENT (OUTPATIENT)
Dept: OUTPATIENT SERVICES | Facility: HOSPITAL | Age: 66
LOS: 1 days | End: 2021-08-18

## 2021-08-18 ENCOUNTER — APPOINTMENT (OUTPATIENT)
Dept: MRI IMAGING | Facility: CLINIC | Age: 66
End: 2021-08-18
Payer: COMMERCIAL

## 2021-08-18 DIAGNOSIS — Z98.890 OTHER SPECIFIED POSTPROCEDURAL STATES: Chronic | ICD-10-CM

## 2021-08-18 DIAGNOSIS — Z90.49 ACQUIRED ABSENCE OF OTHER SPECIFIED PARTS OF DIGESTIVE TRACT: Chronic | ICD-10-CM

## 2021-08-18 DIAGNOSIS — Z90.710 ACQUIRED ABSENCE OF BOTH CERVIX AND UTERUS: Chronic | ICD-10-CM

## 2021-08-18 DIAGNOSIS — M17.12 UNILATERAL PRIMARY OSTEOARTHRITIS, LEFT KNEE: ICD-10-CM

## 2021-08-18 PROCEDURE — 73721 MRI JNT OF LWR EXTRE W/O DYE: CPT | Mod: 26,LT

## 2021-09-03 ENCOUNTER — APPOINTMENT (OUTPATIENT)
Dept: ORTHOPEDIC SURGERY | Facility: CLINIC | Age: 66
End: 2021-09-03
Payer: COMMERCIAL

## 2021-09-03 PROCEDURE — 99441: CPT

## 2021-09-03 NOTE — HISTORY OF PRESENT ILLNESS
[de-identified] : I called the patient today to review the MRI of her left knee.  She does have progressive medial compartment osteoarthritis.  In addition she has a stress reaction under the medial tibial plateau.  She is having worsening pain.  She is interested in pursuing total knee arthroplasty if she has on her contralateral right knee.  She also requests that we give her some pain medication for the week before her upcoming  pituitary surgery where she cannot use her anti-inflammatories.  I did prescribe her a week's worth of tramadol.  She does understand that we cannot risk fill this in the future.  I did ask her to discuss it with her surgeon to make sure that it is okay that she is on that medication prior to surgery.  The patient is going to see us in November we will likely talk about pursuing left total knee arthroplasty at that time\par \par The patient is a year old individual with end stage arthritis of their left knee joint. Based upon the patient's continued symptoms and failure to respond to conservative treatment I have recommended a left total knee arthroplasty for this patient. A long discussion took place with the patient describing what a total joint replacement is and what the procedure would entail. A total knee arthroplasty model, similar to the implant that will be used during the operation, was utilized to demonstrate and to discuss the various bearing surfaces of the implants. The hospitalization and post-operative care and rehabilitation were also discussed. The use of perioperative antibiotics and DVT prophylaxis were discussed. The risk, benefits and alternatives to a surgical intervention were discussed at length with the patient.  The patient was also advised of risks related to the medical comorbidities, elevated body mass index (BMI),  and smoking where applicable.  We discussed how to reduce modifiable risk factors and encouraged smoking cessation were applicable. A lengthy discussion took place to review the most common complications including but not limited to: deep vein thrombosis, pulmonary embolus, heart attack, stroke, infection, wound breakdown, numbness, damage to nerves, tendon, muscles, arteries or other blood vessels, death and other possible complications from anesthesia. The patient was told that we will take steps to minimize these risks by using sterile technique, antibiotics and DVT prophylaxis when appropriate and follow the patient postoperatively in the office setting to monitor progress. The possibility of recurrent pain, no improvement in pain and actual worsening of pain were also discussed with the patient.\par The discharge plan of care focused on the patient going home following surgery. The patient was encouraged to make the necessary arrangements to have someone stay with them when they are discharged home. Following discharge, a home care nurse will visit the patient. The home care nurse will open your home care case and request home physical therapy services. Home physical therapy will commence following discharge provided it is appropriate and covered by the health insurance benefit plan. \par The benefits of surgery were discussed with the patient including the potential for improving his/her current clinical condition through operative intervention. Alternatives to surgical intervention including continued conservative management were also discussed in detail. All questions were answered to the satisfaction of the patient. The treatment plan of care, as well as a model of a total knee arthroplasty equivalent to the one that will be used for their total joint replacement, was shared with the patient. The patient agreed to the plan of care as well as the use of implants in their total joint replacement. \par

## 2021-09-03 NOTE — REASON FOR VISIT
[Home] : at home, [unfilled] , at the time of the visit. [Medical Office: ([facility])___] : at the medical office located in  [Verbal consent obtained from patient] : the patient, [unfilled]

## 2021-09-24 NOTE — REASON FOR VISIT
[facility]                       Gastroenterology [address] [facility] Gastroenterology    Keren Tinsley Patient Status:  Inpatient    1938 MRN [id number]   [facility] 8NE-A Attending Grecia Gaona MD   [facility] Day [facility] 1 KARAN De La Rosa unspecified radiation 2010    prostate   • Frequent urination    • High blood pressure    • High cholesterol    • Irregular bowel habits    • Leaking of urine    • Leg swelling    • Osteoarthritis    • Pacemaker    • Renal disorder     Stage III-moderate Blocker)  tamsulosin (FLOMAX) cap 0.4 mg, 0.4 mg, Oral, Daily  [COMPLETED] Perflutren Lipid Microsphere (DEFINITY) 6.52 MG/ML injection 1.5 mL, 1.5 mL, Intravenous, ONCE PRN  [COMPLETED] furosemide (LASIX) injection 40 mg, 40 mg, Intravenous, Once  glucose Respiratory: +COPD, +obstructive sleep apnea            Hematologic: +thrombocytopenia            Dermatologic: The patient reports no recent rashes or chronic skin disorders            Rheumatologic: The patient reports no history of chronic arthritis BUN 33* 36* 38*   CREATSERUM 1.77* 1.64* 1.29   GFRAA 41* 45* 60   GFRNAA 35* 39* 52*   CA 8.2* 8.5 8.3*    137 136   K 3.3* 4.0 3.4*    107 108   CO2 18.0* 20.0* 21.0     Recent Labs   Lab 09/23/21  0530   RBC 3.87   HGB 10.4*   HCT 32.6* 72-year-old with PMH of CAD, hypertension, atrial fibrillation with pacemaker in situ, hyperlipidemia, COPD, obstructive sleep apnea, type 2 diabetes mellitus, CKD, prostate cancer, thrombocytopenia, cirrhosis, who is consulted for elevated LFTs & cholelit to see what the CT abdomen pelvis shows and what the trend of the LFTs are doing  -INR is 2.55; therefore patient would likely need to be reversed prior to EUS unless drop in INR tomm am if they choose to proceed with the procedure  -We will discuss with t [Initial Visit] : an initial visit for [FreeTextEntry2] : right shoulder and elbow pain.

## 2021-10-06 PROBLEM — I10 ESSENTIAL HYPERTENSION: Status: ACTIVE | Noted: 2020-05-14

## 2021-11-10 ENCOUNTER — APPOINTMENT (OUTPATIENT)
Dept: ORTHOPEDIC SURGERY | Facility: CLINIC | Age: 66
End: 2021-11-10
Payer: COMMERCIAL

## 2021-11-10 VITALS
DIASTOLIC BLOOD PRESSURE: 88 MMHG | WEIGHT: 209 LBS | HEIGHT: 66 IN | HEART RATE: 125 BPM | SYSTOLIC BLOOD PRESSURE: 145 MMHG | BODY MASS INDEX: 33.59 KG/M2

## 2021-11-10 DIAGNOSIS — M17.12 UNILATERAL PRIMARY OSTEOARTHRITIS, LEFT KNEE: ICD-10-CM

## 2021-11-10 PROCEDURE — 99214 OFFICE O/P EST MOD 30 MIN: CPT

## 2021-11-10 NOTE — PHYSICAL EXAM
[LE] : Sensory: Intact in bilateral lower extremities [ALL] : dorsalis pedis, posterior tibial, femoral, popliteal, and radial 2+ and symmetric bilaterally [Antalgic] : not antalgic [de-identified] : GENERAL APPEARANCE:   Well nourished and hydrated, pleasant, alert, and oriented x 4.  \par CARDIOVASCULAR:   No apparent abnormalities.  No lower leg edema.  No varicosities.  Pedal pulses are palpable.\par RESPIRATORY: Breathe sound clear and audible in all lobes. No wheezing, No accessory muscle use.\par NEUROLOGIC:   Sensation is normal, no muscle weakness in the upper or lower extremities.\par DERMATOLOGIC:   No apparent skin lesions, moist, warm, no rash.\par SPINE:   Cervical spine appears normal and moves freely; thoracic spine appears normal and moves freely; lumbosacral spine appears normal and moves freely, normal, nontender.\par MUSCULOSKELETAL:   Hands, wrists, and elbows are normal and move freely, shoulders are normal and move freely. \par  [de-identified] : Left knee exam shows medial joint line tenderness, slight effusion, ROM 0-110 degrees [de-identified] : MRI of the left knee performed at [facility] on 8/18/2021 showed the following:\par 1. Near complete radial tear of the posterior horn of the medial meniscus.\par 2. Broad-based full-thickness chondral loss involving the majority of the central weightbearing portion of the medial femoral condyle medial tibial plateau.\par 3. Subchondral fracture with associated marrow edema within the outer aspect of the medial femoral condyle.\par 4. Mild to moderate medial patellofemoral arthrosis.

## 2021-11-10 NOTE — HISTORY OF PRESENT ILLNESS
[Pain Location] : pain [7] : a current pain level of 7/10 [3] : a minimum pain level of 3/10 [Worsening] : worsening [8] : a maximum pain level of 8/10 [Bending] : worsened by bending [Walking] : worsened by walking [NSAIDs] : relieved by nonsteroidal anti-inflammatory drugs [Rest] : relieved by rest [de-identified] : Pt is here for f/u of left knee pain for 6M. The pain is severe and constant. She had a cortisone injection which helped, but the last one did not help at all . She was given tramadol which originally worked, but is no longer providing her with relief. She is taking Diclofenac consistently, but it is not giving her the same relief as it did before. She is a candidate for a left knee replacement. She is having a stress reaction. 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 She is s/p right TKA in 2017 with good outcome\par

## 2021-11-10 NOTE — END OF VISIT
[FreeTextEntry3] : I, Vishnu Diego, acted solely as a scribe for Dr. Karlos De La Vega on this date 11/10/2021.

## 2021-11-10 NOTE — DISCUSSION/SUMMARY
[Medication Risks Reviewed] : Medication risks reviewed [Surgical risks reviewed] : Surgical risks reviewed [de-identified] : 65 y/o F with progressive medial compartment osteoarthritis and a stress reaction under the medial tibial plateau in the left knee. She is experiencing worsening pain which is significantly inhibiting her ADLs. She has failed conservative therapies and notes that her quality of life is decreasing. She would like to have the left TKA before January 2022. We discussed pre-op, surgery, and post-op in detail. She scheduled the left TKA today and all of her questions were answered. \par \par The patient is a 66 year individual with end stage arthritis of their left knee joint. Based upon the patient's continued symptoms and failure to respond to conservative treatment I have recommended a left total knee arthroplasty for this patient. A long discussion took place with the patient describing what a total joint replacement is and what the procedure would entail. A total knee arthroplasty model, similar to the implant that was used during the operation, was utilized to demonstrate and to discuss the various bearing surfaces of the implants. The hospitalization and post-operative care and rehabilitation were also discussed. The use of perioperative antibiotics and DVT prophylaxis were discussed. The risk, benefits and alternatives to a surgical intervention were discussed at length with the patient. The patient was also advised of risks related to the medical comorbidities, elevated body mass index (BMI), and smoking where applicable. We discussed how to reduce modifiable risk factors and encouraged smoking cessation were applicable.. A lengthy discussion took place to review the most common complications including but not limited to: deep vein thrombosis, pulmonary embolus, heart attack, stroke, infection, wound breakdown, numbness, damage to nerves, tendon, muscles, arteries or other blood vessels, death and other possible complications from anesthesia. The patient was told that we will take steps to minimize these risks by using sterile technique, antibiotics and DVT prophylaxis when appropriate and follow the patient postoperatively in the office setting to monitor progress. The possibility of recurrent pain, no improvement in pain and actual worsening of pain were also discussed with the patient.\par The discharge plan of care focused on the patient going home following surgery. The patient was encouraged to make the necessary arrangements to have someone stay with them when they are discharged home. Following discharge, a home care nurse was to the patient. The home care nurse would open the patient’s home care case and request home physical therapy services. Home physical therapy was to commence following discharge provided it was appropriate and covered by the health insurance benefit plan. \par The benefits of surgery were discussed with the patient including the potential for improving her current clinical condition through operative intervention. Alternatives to surgical intervention including continued conservative management were also discussed in detail. All questions were answered to the satisfaction of the patient. The treatment plan of care, as well as a model of a total knee arthroplasty equivalent to the one that will be used for their total joint replacement, was shared with the patient. The patient agreed to the plan of care as well as the use of implants in their total joint replacement.

## 2021-11-18 ENCOUNTER — OUTPATIENT (OUTPATIENT)
Dept: OUTPATIENT SERVICES | Facility: HOSPITAL | Age: 66
LOS: 1 days | End: 2021-11-18
Payer: COMMERCIAL

## 2021-11-18 VITALS
RESPIRATION RATE: 18 BRPM | DIASTOLIC BLOOD PRESSURE: 96 MMHG | TEMPERATURE: 98 F | HEART RATE: 104 BPM | HEIGHT: 65 IN | SYSTOLIC BLOOD PRESSURE: 130 MMHG | OXYGEN SATURATION: 98 % | WEIGHT: 209.22 LBS

## 2021-11-18 DIAGNOSIS — Z96.651 PRESENCE OF RIGHT ARTIFICIAL KNEE JOINT: Chronic | ICD-10-CM

## 2021-11-18 DIAGNOSIS — Z90.710 ACQUIRED ABSENCE OF BOTH CERVIX AND UTERUS: Chronic | ICD-10-CM

## 2021-11-18 DIAGNOSIS — Z98.890 OTHER SPECIFIED POSTPROCEDURAL STATES: Chronic | ICD-10-CM

## 2021-11-18 DIAGNOSIS — J45.909 UNSPECIFIED ASTHMA, UNCOMPLICATED: ICD-10-CM

## 2021-11-18 DIAGNOSIS — Z90.49 ACQUIRED ABSENCE OF OTHER SPECIFIED PARTS OF DIGESTIVE TRACT: Chronic | ICD-10-CM

## 2021-11-18 DIAGNOSIS — R00.0 TACHYCARDIA, UNSPECIFIED: ICD-10-CM

## 2021-11-18 DIAGNOSIS — Z29.9 ENCOUNTER FOR PROPHYLACTIC MEASURES, UNSPECIFIED: ICD-10-CM

## 2021-11-18 DIAGNOSIS — D35.2 BENIGN NEOPLASM OF PITUITARY GLAND: Chronic | ICD-10-CM

## 2021-11-18 DIAGNOSIS — Z01.818 ENCOUNTER FOR OTHER PREPROCEDURAL EXAMINATION: ICD-10-CM

## 2021-11-18 DIAGNOSIS — M17.12 UNILATERAL PRIMARY OSTEOARTHRITIS, LEFT KNEE: ICD-10-CM

## 2021-11-18 LAB
A1C WITH ESTIMATED AVERAGE GLUCOSE RESULT: 5 % — SIGNIFICANT CHANGE UP (ref 4–5.6)
ANION GAP SERPL CALC-SCNC: 10 MMOL/L — SIGNIFICANT CHANGE UP (ref 5–17)
APTT BLD: 30.6 SEC — SIGNIFICANT CHANGE UP (ref 27.5–35.5)
BASOPHILS # BLD AUTO: 0.1 K/UL — SIGNIFICANT CHANGE UP (ref 0–0.2)
BASOPHILS NFR BLD AUTO: 1.1 % — SIGNIFICANT CHANGE UP (ref 0–2)
BLD GP AB SCN SERPL QL: SIGNIFICANT CHANGE UP
BUN SERPL-MCNC: 15.9 MG/DL — SIGNIFICANT CHANGE UP (ref 8–20)
CALCIUM SERPL-MCNC: 10 MG/DL — SIGNIFICANT CHANGE UP (ref 8.6–10.2)
CHLORIDE SERPL-SCNC: 103 MMOL/L — SIGNIFICANT CHANGE UP (ref 98–107)
CO2 SERPL-SCNC: 27 MMOL/L — SIGNIFICANT CHANGE UP (ref 22–29)
CREAT SERPL-MCNC: 0.69 MG/DL — SIGNIFICANT CHANGE UP (ref 0.5–1.3)
EOSINOPHIL # BLD AUTO: 0.35 K/UL — SIGNIFICANT CHANGE UP (ref 0–0.5)
EOSINOPHIL NFR BLD AUTO: 3.8 % — SIGNIFICANT CHANGE UP (ref 0–6)
ESTIMATED AVERAGE GLUCOSE: 97 MG/DL — SIGNIFICANT CHANGE UP (ref 68–114)
GLUCOSE SERPL-MCNC: 96 MG/DL — SIGNIFICANT CHANGE UP (ref 70–99)
HCT VFR BLD CALC: 41.6 % — SIGNIFICANT CHANGE UP (ref 34.5–45)
HGB BLD-MCNC: 12.9 G/DL — SIGNIFICANT CHANGE UP (ref 11.5–15.5)
IMM GRANULOCYTES NFR BLD AUTO: 0.2 % — SIGNIFICANT CHANGE UP (ref 0–1.5)
INR BLD: 1.07 RATIO — SIGNIFICANT CHANGE UP (ref 0.88–1.16)
LYMPHOCYTES # BLD AUTO: 2.94 K/UL — SIGNIFICANT CHANGE UP (ref 1–3.3)
LYMPHOCYTES # BLD AUTO: 32.2 % — SIGNIFICANT CHANGE UP (ref 13–44)
MCHC RBC-ENTMCNC: 28.8 PG — SIGNIFICANT CHANGE UP (ref 27–34)
MCHC RBC-ENTMCNC: 31 GM/DL — LOW (ref 32–36)
MCV RBC AUTO: 92.9 FL — SIGNIFICANT CHANGE UP (ref 80–100)
MONOCYTES # BLD AUTO: 0.78 K/UL — SIGNIFICANT CHANGE UP (ref 0–0.9)
MONOCYTES NFR BLD AUTO: 8.5 % — SIGNIFICANT CHANGE UP (ref 2–14)
MRSA PCR RESULT.: DETECTED
NEUTROPHILS # BLD AUTO: 4.94 K/UL — SIGNIFICANT CHANGE UP (ref 1.8–7.4)
NEUTROPHILS NFR BLD AUTO: 54.2 % — SIGNIFICANT CHANGE UP (ref 43–77)
PLATELET # BLD AUTO: 338 K/UL — SIGNIFICANT CHANGE UP (ref 150–400)
POTASSIUM SERPL-MCNC: 4.1 MMOL/L — SIGNIFICANT CHANGE UP (ref 3.5–5.3)
POTASSIUM SERPL-SCNC: 4.1 MMOL/L — SIGNIFICANT CHANGE UP (ref 3.5–5.3)
PROTHROM AB SERPL-ACNC: 12.4 SEC — SIGNIFICANT CHANGE UP (ref 10.6–13.6)
RBC # BLD: 4.48 M/UL — SIGNIFICANT CHANGE UP (ref 3.8–5.2)
RBC # FLD: 12.7 % — SIGNIFICANT CHANGE UP (ref 10.3–14.5)
S AUREUS DNA NOSE QL NAA+PROBE: DETECTED
SODIUM SERPL-SCNC: 140 MMOL/L — SIGNIFICANT CHANGE UP (ref 135–145)
WBC # BLD: 9.13 K/UL — SIGNIFICANT CHANGE UP (ref 3.8–10.5)
WBC # FLD AUTO: 9.13 K/UL — SIGNIFICANT CHANGE UP (ref 3.8–10.5)

## 2021-11-18 PROCEDURE — 93010 ELECTROCARDIOGRAM REPORT: CPT

## 2021-11-18 PROCEDURE — G0463: CPT

## 2021-11-18 PROCEDURE — 93005 ELECTROCARDIOGRAM TRACING: CPT

## 2021-11-18 RX ORDER — LOSARTAN/HYDROCHLOROTHIAZIDE 100MG-25MG
1 TABLET ORAL
Qty: 0 | Refills: 0 | DISCHARGE

## 2021-11-18 RX ORDER — OMEPRAZOLE 10 MG/1
1 CAPSULE, DELAYED RELEASE ORAL
Qty: 0 | Refills: 0 | DISCHARGE

## 2021-11-18 RX ORDER — MUPIROCIN 20 MG/G
1 OINTMENT TOPICAL
Qty: 1 | Refills: 0
Start: 2021-11-18 | End: 2021-11-22

## 2021-11-18 NOTE — H&P PST ADULT - ASSESSMENT
CAPRINI SCORE    AGE RELATED RISK FACTORS                                                             [ ] Age 41-60 years                                            (1 Point)  [ ] Age: 61-74 years                                           (2 Points)                 [ ] Age= 75 years                                                (3 Points)             DISEASE RELATED RISK FACTORS                                                       [ ] Edema in the lower extremities                 (1 Point)                     [ ] Varicose veins                                               (1 Point)                                 [ ] BMI > 25 Kg/m2                                            (1 Point)                                  [ ] Serious infection (ie PNA)                            (1 Point)                     [ ] Lung disease ( COPD, Emphysema)            (1 Point)                                                                          [ ] Acute myocardial infarction                         (1 Point)                  [ ] Congestive heart failure (in the previous month)  (1 Point)         [ ] Inflammatory bowel disease                            (1 Point)                  [ ] Central venous access, PICC or Port               (2 points)       (within the last month)                                                                [ ] Stroke (in the previous month)                        (5 Points)    [ ] Previous or present malignancy                       (2 points)                                                                                                                                                         HEMATOLOGY RELATED FACTORS                                                         [ ] Prior episodes of VTE                                     (3 Points)                     [ ] Positive family history for VTE                      (3 Points)                  [ ] Prothrombin 24476 A                                     (3 Points)                     [ ] Factor V Leiden                                                (3 Points)                        [ ] Lupus anticoagulants                                      (3 Points)                                                           [ ] Anticardiolipin antibodies                              (3 Points)                                                       [ ] High homocysteine in the blood                   (3 Points)                                             [ ] Other congenital or acquired thrombophilia      (3 Points)                                                [ ] Heparin induced thrombocytopenia                  (3 Points)                                        MOBILITY RELATED FACTORS  [ ] Bed rest                                                         (1 Point)  [ ] Plaster cast                                                    (2 points)  [ ] Bed bound for more than 72 hours           (2 Points)    GENDER SPECIFIC FACTORS  [ ] Pregnancy or had a baby within the last month   (1 Point)  [ ] Post-partum < 6 weeks                                   (1 Point)  [ ] Hormonal therapy  or oral contraception   (1 Point)  [ ] History of pregnancy complications              (1 point)  [ ] Unexplained or recurrent              (1 Point)    OTHER RISK FACTORS                                           (1 Point)  [ ] BMI >40, smoking, diabetes requiring insulin, chemotherapy  blood transfusions and length of surgery over 2 hours    SURGERY RELATED RISK FACTORS  [ ]  Section within the last month     (1 Point)  [ ] Minor surgery                                                  (1 Point)  [ ] Arthroscopic surgery                                       (2 Points)  [ ] Planned major surgery lasting more            (2 Points)      than 45 minutes     [ ] Elective hip or knee joint replacement       (5 points)       surgery                                                TRAUMA RELATED RISK FACTORS  [ ] Fracture of the hip, pelvis, or leg                       (5 Points)  [ ] Spinal cord injury resulting in paralysis             (5 points)       (in the previous month)    [ ] Paralysis  (less than 1 month)                             (5 Points)  [ ] Multiple Trauma within 1 month                        (5 Points)    Total Score [        ]    Caprini Score 0-2: Low Risk, NO VTE prophylaxis required for most patients, encourage ambulation  Caprini Score 3-6: Moderate Risk , pharmacologic VTE prophylaxis is indicated for most patients (in the absence of contraindications)  Caprini Score Greater than or =7: High risk, pharmocologic VTE prophylaxis indicated for most patients (in the absence of contraindications)            OPIOID RISK TOOL    SUSAN EACH BOX THAT APPLIES AND ADD TOTALS AT THE END    FAMILY HISTORY OF SUBSTANCE ABUSE                 FEMALE         MALE                                                Alcohol                             [  ]1 pt          [  ]3pts                                               Illegal Durgs                     [  ]2 pts        [  ]3pts                                               Rx Drugs                           [  ]4 pts        [  ]4 pts    PERSONAL HISTORY OF SUBSTANCE ABUSE                                                                                          Alcohol                             [  ]3 pts       [  ]3 pts                                               Illegal Durgs                     [  ]4 pts        [  ]4 pts                                               Rx Drugs                           [  ]5 pts        [  ]5 pts    AGE BETWEEN 16-45 YEARS                                      [  ]1 pt         [  ]1 pt    HISTORY OF PREADOLESCENT   SEXUAL ABUSE                                                             [  ]3 pts        [  ]0pts    PSYCHOLOGICAL DISEASE                     ADD, OCD, Bipolar, Schizophrenia        [  ]2 pts         [  ]2 pts                      Depression                                               [  ]1 pt           [  ]1 pt           SCORING TOTAL   (add numbers and type here)              (***)                                     A score of 3 or lower indicated LOW risk for future opiod abuse  A score of 4 to 7 indicated moderate risk for future opiod abuse  A score of 8 or higher indicates a high risk for opiod abuse CAPRINI SCORE    AGE RELATED RISK FACTORS                                                             [ ] Age 41-60 years                                            (1 Point)  [x ] Age: 61-74 years                                           (2 Points)                 [ ] Age= 75 years                                                (3 Points)             DISEASE RELATED RISK FACTORS                                                       [x ] Edema in the lower extremities                 (1 Point)                     [ ] Varicose veins                                               (1 Point)                                 [x ] BMI > 25 Kg/m2                                            (1 Point)                                  [ ] Serious infection (ie PNA)                            (1 Point)                     [ ] Lung disease ( COPD, Emphysema)            (1 Point)                                                                          [ ] Acute myocardial infarction                         (1 Point)                  [ ] Congestive heart failure (in the previous month)  (1 Point)         [ ] Inflammatory bowel disease                            (1 Point)                  [ ] Central venous access, PICC or Port               (2 points)       (within the last month)                                                                [ ] Stroke (in the previous month)                        (5 Points)    [x ] Previous or present malignancy                       (2 points)                                                                                                                                                         HEMATOLOGY RELATED FACTORS                                                         [ ] Prior episodes of VTE                                     (3 Points)                     [ ] Positive family history for VTE                      (3 Points)                  [ ] Prothrombin 33642 A                                     (3 Points)                     [ ] Factor V Leiden                                                (3 Points)                        [ ] Lupus anticoagulants                                      (3 Points)                                                           [ ] Anticardiolipin antibodies                              (3 Points)                                                       [ ] High homocysteine in the blood                   (3 Points)                                             [ ] Other congenital or acquired thrombophilia      (3 Points)                                                [ ] Heparin induced thrombocytopenia                  (3 Points)                                        MOBILITY RELATED FACTORS  [ ] Bed rest                                                         (1 Point)  [ ] Plaster cast                                                    (2 points)  [ ] Bed bound for more than 72 hours           (2 Points)    GENDER SPECIFIC FACTORS  [ ] Pregnancy or had a baby within the last month   (1 Point)  [ ] Post-partum < 6 weeks                                   (1 Point)  [ ] Hormonal therapy  or oral contraception   (1 Point)  [ ] History of pregnancy complications              (1 point)  [ ] Unexplained or recurrent              (1 Point)    OTHER RISK FACTORS                                           (1 Point)  [ ] BMI >40, smoking, diabetes requiring insulin, chemotherapy  blood transfusions and length of surgery over 2 hours    SURGERY RELATED RISK FACTORS  [ ]  Section within the last month     (1 Point)  [ ] Minor surgery                                                  (1 Point)  [ ] Arthroscopic surgery                                       (2 Points)  [ ] Planned major surgery lasting more            (2 Points)      than 45 minutes     [x ] Elective hip or knee joint replacement       (5 points)       surgery                                                TRAUMA RELATED RISK FACTORS  [ ] Fracture of the hip, pelvis, or leg                       (5 Points)  [ ] Spinal cord injury resulting in paralysis             (5 points)       (in the previous month)    [ ] Paralysis  (less than 1 month)                             (5 Points)  [ ] Multiple Trauma within 1 month                        (5 Points)    Total Score [   11     ]    Caprini Score 0-2: Low Risk, NO VTE prophylaxis required for most patients, encourage ambulation  Caprini Score 3-6: Moderate Risk , pharmacologic VTE prophylaxis is indicated for most patients (in the absence of contraindications)  Caprini Score Greater than or =7: High risk, pharmocologic VTE prophylaxis indicated for most patients (in the absence of contraindications)            OPIOID RISK TOOL    SUSAN EACH BOX THAT APPLIES AND ADD TOTALS AT THE END    FAMILY HISTORY OF SUBSTANCE ABUSE                 FEMALE         MALE                                                Alcohol                             [  ]1 pt          [  ]3pts                                               Illegal Durgs                     [  ]2 pts        [  ]3pts                                               Rx Drugs                           [  ]4 pts        [  ]4 pts    PERSONAL HISTORY OF SUBSTANCE ABUSE                                                                                          Alcohol                             [  ]3 pts       [  ]3 pts                                               Illegal Durgs                     [  ]4 pts        [  ]4 pts                                               Rx Drugs                           [  ]5 pts        [  ]5 pts    AGE BETWEEN 16-45 YEARS                                      [  ]1 pt         [  ]1 pt    HISTORY OF PREADOLESCENT   SEXUAL ABUSE                                                             [  ]3 pts        [  ]0pts    PSYCHOLOGICAL DISEASE                     ADD, OCD, Bipolar, Schizophrenia        [  ]2 pts         [  ]2 pts                      Depression                                               [  ]1 pt           [  ]1 pt           SCORING TOTAL  0  A score of 3 or lower indicated LOW risk for future opiod abuse  A score of 4 to 7 indicated moderate risk for future opiod abuse  A score of 8 or higher indicates a high risk for opiod abuse    66 year old female with a pmhx of asthma last exacerbation 1 year ago, last used rescue inhaler months ago, hypothyrodism, OA, endometrial cancer s/p hysterectomy, recently had pituitary adenoma removed by Dr Kumar at [facility] . Patient presents to PST today with complaints of left knee pain since Spring, pain is getting progressively worse, describes the pain as sharp, 9/10 in severity cannot walk, worse with sitting from standing, reports stiffness, cannot go up and down stairs, holding on to furniture to walk around her house, has tried cortisone injections without relief, taking diclofenac PO. MRI of the knee revealed complete radial tear of the posterior horn of the medial meniscus, broad-based full-thickness chondral loss involving the majority of the central weightbearing portion of the medial femoral condyle medial tibial plateau, subchondral fracture with associated marrow edema within the outer aspect of the medial femoral condyle, mild to moderate medial patellofemoral arthrosis.  Tenderness at medial aspect of knee, joint swelling present, decreased rom with flexion > extension. Patient is scheduled for left knee total joint replacement with Dr De La Vega on 21. Patient educated on surgical scrub, COVID testing , eras protocol, preadmission instructions, medical clearance and day of procedure medications, verbalizes understanding. Pt instructed to stop vitamins/supplements/herbal medications/ASA/NSAIDS (diclofenac) for one week prior to surgery and discuss with PMD. Instructed to switch to Tylenol instead. Elevated BP and HR today at [facility], pt was taken off losartan hctz by neurosurgeon, left message for office regarding when she can restart, will notify pcp Dr Pop.  CAPRINI SCORE    AGE RELATED RISK FACTORS                                                             [ ] Age 41-60 years                                            (1 Point)  [x ] Age: 61-74 years                                           (2 Points)                 [ ] Age= 75 years                                                (3 Points)             DISEASE RELATED RISK FACTORS                                                       [x ] Edema in the lower extremities                 (1 Point)                     [ ] Varicose veins                                               (1 Point)                                 [x ] BMI > 25 Kg/m2                                            (1 Point)                                  [ ] Serious infection (ie PNA)                            (1 Point)                     [ ] Lung disease ( COPD, Emphysema)            (1 Point)                                                                          [ ] Acute myocardial infarction                         (1 Point)                  [ ] Congestive heart failure (in the previous month)  (1 Point)         [ ] Inflammatory bowel disease                            (1 Point)                  [ ] Central venous access, PICC or Port               (2 points)       (within the last month)                                                                [ ] Stroke (in the previous month)                        (5 Points)    [x ] Previous or present malignancy                       (2 points)                                                                                                                                                         HEMATOLOGY RELATED FACTORS                                                         [ ] Prior episodes of VTE                                     (3 Points)                     [ ] Positive family history for VTE                      (3 Points)                  [ ] Prothrombin 11484 A                                     (3 Points)                     [ ] Factor V Leiden                                                (3 Points)                        [ ] Lupus anticoagulants                                      (3 Points)                                                           [ ] Anticardiolipin antibodies                              (3 Points)                                                       [ ] High homocysteine in the blood                   (3 Points)                                             [ ] Other congenital or acquired thrombophilia      (3 Points)                                                [ ] Heparin induced thrombocytopenia                  (3 Points)                                        MOBILITY RELATED FACTORS  [ ] Bed rest                                                         (1 Point)  [ ] Plaster cast                                                    (2 points)  [ ] Bed bound for more than 72 hours           (2 Points)    GENDER SPECIFIC FACTORS  [ ] Pregnancy or had a baby within the last month   (1 Point)  [ ] Post-partum < 6 weeks                                   (1 Point)  [ ] Hormonal therapy  or oral contraception   (1 Point)  [ ] History of pregnancy complications              (1 point)  [ ] Unexplained or recurrent              (1 Point)    OTHER RISK FACTORS                                           (1 Point)  [ ] BMI >40, smoking, diabetes requiring insulin, chemotherapy  blood transfusions and length of surgery over 2 hours    SURGERY RELATED RISK FACTORS  [ ]  Section within the last month     (1 Point)  [ ] Minor surgery                                                  (1 Point)  [ ] Arthroscopic surgery                                       (2 Points)  [ ] Planned major surgery lasting more            (2 Points)      than 45 minutes     [x ] Elective hip or knee joint replacement       (5 points)       surgery                                                TRAUMA RELATED RISK FACTORS  [ ] Fracture of the hip, pelvis, or leg                       (5 Points)  [ ] Spinal cord injury resulting in paralysis             (5 points)       (in the previous month)    [ ] Paralysis  (less than 1 month)                             (5 Points)  [ ] Multiple Trauma within 1 month                        (5 Points)    Total Score [   11     ]    Caprini Score 0-2: Low Risk, NO VTE prophylaxis required for most patients, encourage ambulation  Caprini Score 3-6: Moderate Risk , pharmacologic VTE prophylaxis is indicated for most patients (in the absence of contraindications)  Caprini Score Greater than or =7: High risk, pharmocologic VTE prophylaxis indicated for most patients (in the absence of contraindications)            OPIOID RISK TOOL    SUSAN EACH BOX THAT APPLIES AND ADD TOTALS AT THE END    FAMILY HISTORY OF SUBSTANCE ABUSE                 FEMALE         MALE                                                Alcohol                             [  ]1 pt          [  ]3pts                                               Illegal Durgs                     [  ]2 pts        [  ]3pts                                               Rx Drugs                           [  ]4 pts        [  ]4 pts    PERSONAL HISTORY OF SUBSTANCE ABUSE                                                                                          Alcohol                             [  ]3 pts       [  ]3 pts                                               Illegal Durgs                     [  ]4 pts        [  ]4 pts                                               Rx Drugs                           [  ]5 pts        [  ]5 pts    AGE BETWEEN 16-45 YEARS                                      [  ]1 pt         [  ]1 pt    HISTORY OF PREADOLESCENT   SEXUAL ABUSE                                                             [  ]3 pts        [  ]0pts    PSYCHOLOGICAL DISEASE                     ADD, OCD, Bipolar, Schizophrenia        [  ]2 pts         [  ]2 pts                      Depression                                               [  ]1 pt           [  ]1 pt           SCORING TOTAL  0  A score of 3 or lower indicated LOW risk for future opiod abuse  A score of 4 to 7 indicated moderate risk for future opiod abuse  A score of 8 or higher indicates a high risk for opiod abuse    66 year old female with a pmhx of asthma last exacerbation 1 year ago, last used rescue inhaler months ago, hypothyrodism, OA, endometrial cancer s/p hysterectomy, recently had pituitary adenoma removed by Dr Kumar at [facility] . Patient presents to PST today with complaints of left knee pain since Spring, pain is getting progressively worse, describes the pain as sharp, 9/10 in severity cannot walk, worse with sitting from standing, reports stiffness, cannot go up and down stairs, holding on to furniture to walk around her house, has tried cortisone injections without relief, taking diclofenac PO. MRI of the knee revealed complete radial tear of the posterior horn of the medial meniscus, broad-based full-thickness chondral loss involving the majority of the central weightbearing portion of the medial femoral condyle medial tibial plateau, subchondral fracture with associated marrow edema within the outer aspect of the medial femoral condyle, mild to moderate medial patellofemoral arthrosis.  Tenderness at medial aspect of knee, joint swelling present, decreased rom with flexion > extension. Patient is scheduled for left knee total joint replacement with Dr De La Vega on 21. Patient educated on surgical scrub, COVID testing , eras protocol, preadmission instructions, medical clearance and day of procedure medications, verbalizes understanding. Pt instructed to stop vitamins/supplements/herbal medications/ASA/NSAIDS (diclofenac) for one week prior to surgery and discuss with PMD. Instructed to switch to Tylenol instead. Elevated BP and HR today at [facility], pt was taken off losartan hctz by neurosurgeon, left message for office regarding when she can restart, will notify pcp Dr Pop.

## 2021-11-18 NOTE — H&P PST ADULT - LAB RESULTS AND INTERPRETATION
labs pending +MSSA/+MRSA mupirocin ordered, JULIA Sheldon notified for Dr De La Vega +MSSA/+MRSA mupirocin ordered, JULIA Sheldon notified for Dr De La Vega  Patient given instructions for use, start 11/19 use twice daily, hold AM of covid swab use following swab, use AM of surgery, verbalized understanding.

## 2021-11-18 NOTE — H&P PST ADULT - COMMENTS
elevated BP and HR, pt was taken off losartan hctz by neurosurgeon, left message for office regarding when she can restart

## 2021-11-18 NOTE — H&P PST ADULT - PROBLEM SELECTOR PLAN 3
Caprini Score 11, High risk,  Surgical team should assess /Strongly recommend pharmacological and mechanical measures for VTE prophylaxis

## 2021-11-18 NOTE — H&P PST ADULT - NSICDXPASTSURGICALHX_GEN_ALL_CORE_FT
PAST SURGICAL HISTORY:  Pituitary adenoma s/p removal 9/21    S/P arthroscopy of shoulder     S/P cholecystectomy     S/P dilatation and curettage     S/P right knee arthroscopy     S/P tendon repair R hand    S/P total hysterectomy      PAST SURGICAL HISTORY:  H/O total knee replacement, right     Pituitary adenoma s/p removal 9/21    S/P arthroscopy of shoulder     S/P cholecystectomy     S/P dilatation and curettage     S/P right knee arthroscopy     S/P tendon repair R hand    S/P total hysterectomy

## 2021-11-18 NOTE — H&P PST ADULT - PROBLEM SELECTOR PLAN 1
Patient is scheduled for left knee total joint replacement with Dr De La Vega on 11/24/21. Medical clearance pending.  Neurosurg clearance pending

## 2021-11-18 NOTE — H&P PST ADULT - NS SC CAGE ALCOHOL ANNOYED YOU
ASSESSMENT/PLAN                                                      (M54.42,  M54.41) Acute midline low back pain with bilateral sciatica  (primary encounter diagnosis)  Plan: Medrol Dosepak prescribed - patient to use as directed; Robaxin as needed; physical therapy offered - declined; may use warm packs as needed for pain relief; work note provided; if symptoms worsen, change, or do not improve, patient to contact MD.      Uma Wheeler MD   [address] [address]  T: [phone], F: [phone]    SUBJECTIVE                                                      Blas Perkins is a very pleasant 44 year old female who presents with back pain:    Ongoing for the last month, but acute worsening this morning. No precipitating trauma, injury, or unusual exertion. Pain is located lower back, midline, with radiation down both legs to the level of the calves.     No lower extremity numbness, tingling, or weakness.    No urinary retention or incontinence.    Has been using extra strength ibuprofen with only mild relief of symptoms.    OBJECTIVE                                                      /66   Pulse 74   Temp 99  F (37.2  C) (Tympanic)   Wt 61.7 kg (136 lb 1.6 oz)   LMP 10/28/2020 (Within Days)   SpO2 100%   BMI 21.80 kg/m    Constitutional: uncomfortable-appearing    ---    (Note documentation was completed, in part, with [employer] | [employer] voice-recognition software. Documentation was reviewed, but some grammatical, spelling, and word errors may remain.)    
no

## 2021-11-18 NOTE — H&P PST ADULT - PROBLEM SELECTOR PLAN 2
HR elevated today at -110, pt denies CP or palpitations EKG revealed sinus tachycardia, on exam rhythm is regular, no mrumur or friction rub. BP elevated 130/96, Patient had pituitary adenoma removed 9/21. patient taken off losartan hctz by neurosurgeon, left message for office for call back to determine when she can restart, clearance note from neurosurgeon is pending. Medical clearance pending HR elevated today at -110, pt denies CP or palpitations EKG revealed sinus tachycardia, on exam rhythm is regular, no murmur or friction rub. BP elevated 130/96, Patient had pituitary adenoma removed 9/21. patient taken off losartan hctz by neurosurgeon, left message for office for call back to determine when she can restart, clearance note from neurosurgeon is pending. Medical clearance pending

## 2021-11-18 NOTE — H&P PST ADULT - NSANTHOSAYNRD_GEN_A_CORE
done No. IVA screening performed.  STOP BANG Legend: 0-2 = LOW Risk; 3-4 = INTERMEDIATE Risk; 5-8 = HIGH Risk

## 2021-11-18 NOTE — H&P PST ADULT - PROBLEM SELECTOR PLAN 4
Asthma no recent exacerbations use inhalers as needed has not used in 1 month, lungs clear on exam no wheezing O2 Sat 98% on RA. Use inhaler DOS. Medical clearance is pending

## 2021-11-18 NOTE — H&P PST ADULT - NSICDXPASTMEDICALHX_GEN_ALL_CORE_FT
PAST MEDICAL HISTORY:  Acute pain left shoulder    Asthma     Endometrial cancer     GERD (gastroesophageal reflux disease)     Hypertension     IBS (irritable bowel syndrome)     Overactive bladder     Pituitary adenoma     Risk factors for obstructive sleep apnea     Unilateral primary osteoarthritis, left knee     Uterine polyp

## 2021-11-18 NOTE — H&P PST ADULT - NSICDXFAMILYHX_GEN_ALL_CORE_FT
FAMILY HISTORY:  FH: colon cancer    Father  Still living? No  Family history of aneurysm, Age at diagnosis: Age Unknown  FH: diabetes mellitus, Age at diagnosis: Age Unknown  FH: hypertension, Age at diagnosis: Age Unknown  FH: myocardial infarction, Age at diagnosis: Age Unknown

## 2021-11-19 DIAGNOSIS — Z01.818 ENCOUNTER FOR OTHER PREPROCEDURAL EXAMINATION: ICD-10-CM

## 2021-11-21 ENCOUNTER — APPOINTMENT (OUTPATIENT)
Dept: DISASTER EMERGENCY | Facility: CLINIC | Age: 66
End: 2021-11-21

## 2021-11-22 LAB — SARS-COV-2 N GENE NPH QL NAA+PROBE: NOT DETECTED

## 2021-11-23 ENCOUNTER — FORM ENCOUNTER (OUTPATIENT)
Age: 66
End: 2021-11-23

## 2021-11-23 RX ORDER — VANCOMYCIN HCL 1 G
1500 VIAL (EA) INTRAVENOUS ONCE
Refills: 0 | Status: DISCONTINUED | OUTPATIENT
Start: 2021-11-24 | End: 2021-11-26

## 2021-11-24 ENCOUNTER — INPATIENT (INPATIENT)
Facility: HOSPITAL | Age: 66
LOS: 1 days | Discharge: HOME | DRG: 470 | End: 2021-11-26
Attending: ORTHOPAEDIC SURGERY | Admitting: ORTHOPAEDIC SURGERY
Payer: COMMERCIAL

## 2021-11-24 ENCOUNTER — APPOINTMENT (OUTPATIENT)
Dept: ORTHOPEDIC SURGERY | Facility: HOSPITAL | Age: 66
End: 2021-11-24

## 2021-11-24 ENCOUNTER — TRANSCRIPTION ENCOUNTER (OUTPATIENT)
Age: 66
End: 2021-11-24

## 2021-11-24 VITALS
WEIGHT: 205.03 LBS | TEMPERATURE: 98 F | RESPIRATION RATE: 16 BRPM | OXYGEN SATURATION: 99 % | DIASTOLIC BLOOD PRESSURE: 78 MMHG | HEART RATE: 98 BPM | SYSTOLIC BLOOD PRESSURE: 126 MMHG | HEIGHT: 66 IN

## 2021-11-24 DIAGNOSIS — Z96.651 PRESENCE OF RIGHT ARTIFICIAL KNEE JOINT: Chronic | ICD-10-CM

## 2021-11-24 DIAGNOSIS — Z98.890 OTHER SPECIFIED POSTPROCEDURAL STATES: Chronic | ICD-10-CM

## 2021-11-24 DIAGNOSIS — Z90.710 ACQUIRED ABSENCE OF BOTH CERVIX AND UTERUS: Chronic | ICD-10-CM

## 2021-11-24 DIAGNOSIS — Z91.89 OTHER SPECIFIED PERSONAL RISK FACTORS, NOT ELSEWHERE CLASSIFIED: ICD-10-CM

## 2021-11-24 DIAGNOSIS — Z90.49 ACQUIRED ABSENCE OF OTHER SPECIFIED PARTS OF DIGESTIVE TRACT: Chronic | ICD-10-CM

## 2021-11-24 DIAGNOSIS — M17.12 UNILATERAL PRIMARY OSTEOARTHRITIS, LEFT KNEE: ICD-10-CM

## 2021-11-24 DIAGNOSIS — D35.2 BENIGN NEOPLASM OF PITUITARY GLAND: Chronic | ICD-10-CM

## 2021-11-24 LAB
GLUCOSE BLDC GLUCOMTR-MCNC: 103 MG/DL — HIGH (ref 70–99)
GLUCOSE BLDC GLUCOMTR-MCNC: 110 MG/DL — HIGH (ref 70–99)
GLUCOSE BLDC GLUCOMTR-MCNC: 93 MG/DL — SIGNIFICANT CHANGE UP (ref 70–99)

## 2021-11-24 PROCEDURE — 27447 TOTAL KNEE ARTHROPLASTY: CPT | Mod: AS,LT

## 2021-11-24 PROCEDURE — 20985 CPTR-ASST DIR MS PX: CPT | Mod: NC

## 2021-11-24 PROCEDURE — 99222 1ST HOSP IP/OBS MODERATE 55: CPT

## 2021-11-24 PROCEDURE — 73560 X-RAY EXAM OF KNEE 1 OR 2: CPT | Mod: 26,LT

## 2021-11-24 PROCEDURE — 27447 TOTAL KNEE ARTHROPLASTY: CPT | Mod: LT

## 2021-11-24 RX ORDER — SODIUM CHLORIDE 9 MG/ML
500 INJECTION INTRAMUSCULAR; INTRAVENOUS; SUBCUTANEOUS ONCE
Refills: 0 | Status: COMPLETED | OUTPATIENT
Start: 2021-11-24 | End: 2021-11-24

## 2021-11-24 RX ORDER — ASPIRIN/CALCIUM CARB/MAGNESIUM 324 MG
325 TABLET ORAL
Refills: 0 | Status: DISCONTINUED | OUTPATIENT
Start: 2021-11-24 | End: 2021-11-24

## 2021-11-24 RX ORDER — OXYCODONE HYDROCHLORIDE 5 MG/1
5 TABLET ORAL
Refills: 0 | Status: DISCONTINUED | OUTPATIENT
Start: 2021-11-24 | End: 2021-11-26

## 2021-11-24 RX ORDER — CEFAZOLIN SODIUM 1 G
2000 VIAL (EA) INJECTION ONCE
Refills: 0 | Status: DISCONTINUED | OUTPATIENT
Start: 2021-11-24 | End: 2021-11-24

## 2021-11-24 RX ORDER — HYDROMORPHONE HYDROCHLORIDE 2 MG/ML
4 INJECTION INTRAMUSCULAR; INTRAVENOUS; SUBCUTANEOUS
Refills: 0 | Status: DISCONTINUED | OUTPATIENT
Start: 2021-11-24 | End: 2021-11-26

## 2021-11-24 RX ORDER — OXYCODONE HYDROCHLORIDE 5 MG/1
10 TABLET ORAL
Refills: 0 | Status: DISCONTINUED | OUTPATIENT
Start: 2021-11-24 | End: 2021-11-26

## 2021-11-24 RX ORDER — KETOROLAC TROMETHAMINE 30 MG/ML
15 SYRINGE (ML) INJECTION EVERY 6 HOURS
Refills: 0 | Status: DISCONTINUED | OUTPATIENT
Start: 2021-11-24 | End: 2021-11-25

## 2021-11-24 RX ORDER — LOSARTAN POTASSIUM 100 MG/1
100 TABLET, FILM COATED ORAL DAILY
Refills: 0 | Status: DISCONTINUED | OUTPATIENT
Start: 2021-11-26 | End: 2021-11-26

## 2021-11-24 RX ORDER — SODIUM CHLORIDE 9 MG/ML
1000 INJECTION INTRAMUSCULAR; INTRAVENOUS; SUBCUTANEOUS
Refills: 0 | Status: DISCONTINUED | OUTPATIENT
Start: 2021-11-24 | End: 2021-11-26

## 2021-11-24 RX ORDER — ONDANSETRON 8 MG/1
4 TABLET, FILM COATED ORAL EVERY 6 HOURS
Refills: 0 | Status: DISCONTINUED | OUTPATIENT
Start: 2021-11-24 | End: 2021-11-26

## 2021-11-24 RX ORDER — VANCOMYCIN HCL 1 G
1500 VIAL (EA) INTRAVENOUS
Refills: 0 | Status: COMPLETED | OUTPATIENT
Start: 2021-11-24 | End: 2021-11-24

## 2021-11-24 RX ORDER — DICLOFENAC SODIUM 75 MG/1
1 TABLET, DELAYED RELEASE ORAL
Qty: 0 | Refills: 0 | DISCHARGE

## 2021-11-24 RX ORDER — SODIUM CHLORIDE 9 MG/ML
3 INJECTION INTRAMUSCULAR; INTRAVENOUS; SUBCUTANEOUS EVERY 8 HOURS
Refills: 0 | Status: DISCONTINUED | OUTPATIENT
Start: 2021-11-24 | End: 2021-11-24

## 2021-11-24 RX ORDER — DIPHENHYDRAMINE HCL 50 MG
50 CAPSULE ORAL EVERY 6 HOURS
Refills: 0 | Status: DISCONTINUED | OUTPATIENT
Start: 2021-11-24 | End: 2021-11-26

## 2021-11-24 RX ORDER — SODIUM CHLORIDE 9 MG/ML
1000 INJECTION, SOLUTION INTRAVENOUS
Refills: 0 | Status: DISCONTINUED | OUTPATIENT
Start: 2021-11-24 | End: 2021-11-24

## 2021-11-24 RX ORDER — BUDESONIDE AND FORMOTEROL FUMARATE DIHYDRATE 160; 4.5 UG/1; UG/1
2 AEROSOL RESPIRATORY (INHALATION)
Refills: 0 | Status: DISCONTINUED | OUTPATIENT
Start: 2021-11-24 | End: 2021-11-26

## 2021-11-24 RX ORDER — TRANEXAMIC ACID 100 MG/ML
1000 INJECTION, SOLUTION INTRAVENOUS ONCE
Refills: 0 | Status: DISCONTINUED | OUTPATIENT
Start: 2021-11-24 | End: 2021-11-24

## 2021-11-24 RX ORDER — CEFAZOLIN SODIUM 1 G
2000 VIAL (EA) INJECTION ONCE
Refills: 0 | Status: COMPLETED | OUTPATIENT
Start: 2021-11-24 | End: 2021-11-24

## 2021-11-24 RX ORDER — MONTELUKAST 4 MG/1
10 TABLET, CHEWABLE ORAL DAILY
Refills: 0 | Status: DISCONTINUED | OUTPATIENT
Start: 2021-11-24 | End: 2021-11-26

## 2021-11-24 RX ORDER — HYDROMORPHONE HYDROCHLORIDE 2 MG/ML
0.5 INJECTION INTRAMUSCULAR; INTRAVENOUS; SUBCUTANEOUS
Refills: 0 | Status: DISCONTINUED | OUTPATIENT
Start: 2021-11-24 | End: 2021-11-24

## 2021-11-24 RX ORDER — CEFAZOLIN SODIUM 1 G
VIAL (EA) INJECTION
Refills: 0 | Status: COMPLETED | OUTPATIENT
Start: 2021-11-24 | End: 2021-11-25

## 2021-11-24 RX ORDER — HYDROCHLOROTHIAZIDE 25 MG
25 TABLET ORAL DAILY
Refills: 0 | Status: DISCONTINUED | OUTPATIENT
Start: 2021-11-26 | End: 2021-11-26

## 2021-11-24 RX ORDER — OXYBUTYNIN CHLORIDE 5 MG
5 TABLET ORAL
Refills: 0 | Status: DISCONTINUED | OUTPATIENT
Start: 2021-11-24 | End: 2021-11-26

## 2021-11-24 RX ORDER — ACETAMINOPHEN 500 MG
975 TABLET ORAL ONCE
Refills: 0 | Status: COMPLETED | OUTPATIENT
Start: 2021-11-24 | End: 2021-11-24

## 2021-11-24 RX ORDER — CEFAZOLIN SODIUM 1 G
2000 VIAL (EA) INJECTION EVERY 8 HOURS
Refills: 0 | Status: COMPLETED | OUTPATIENT
Start: 2021-11-24 | End: 2021-11-25

## 2021-11-24 RX ORDER — MAGNESIUM HYDROXIDE 400 MG/1
30 TABLET, CHEWABLE ORAL DAILY
Refills: 0 | Status: DISCONTINUED | OUTPATIENT
Start: 2021-11-24 | End: 2021-11-26

## 2021-11-24 RX ORDER — FENTANYL CITRATE 50 UG/ML
25 INJECTION INTRAVENOUS
Refills: 0 | Status: DISCONTINUED | OUTPATIENT
Start: 2021-11-24 | End: 2021-11-24

## 2021-11-24 RX ORDER — LEVOTHYROXINE SODIUM 125 MCG
50 TABLET ORAL DAILY
Refills: 0 | Status: DISCONTINUED | OUTPATIENT
Start: 2021-11-24 | End: 2021-11-26

## 2021-11-24 RX ORDER — ASPIRIN/CALCIUM CARB/MAGNESIUM 324 MG
325 TABLET ORAL
Refills: 0 | Status: DISCONTINUED | OUTPATIENT
Start: 2021-11-24 | End: 2021-11-26

## 2021-11-24 RX ORDER — ACETAMINOPHEN 500 MG
975 TABLET ORAL EVERY 8 HOURS
Refills: 0 | Status: DISCONTINUED | OUTPATIENT
Start: 2021-11-24 | End: 2021-11-26

## 2021-11-24 RX ORDER — ONDANSETRON 8 MG/1
4 TABLET, FILM COATED ORAL ONCE
Refills: 0 | Status: DISCONTINUED | OUTPATIENT
Start: 2021-11-24 | End: 2021-11-24

## 2021-11-24 RX ORDER — SENNA PLUS 8.6 MG/1
2 TABLET ORAL AT BEDTIME
Refills: 0 | Status: DISCONTINUED | OUTPATIENT
Start: 2021-11-24 | End: 2021-11-26

## 2021-11-24 RX ORDER — PANTOPRAZOLE SODIUM 20 MG/1
40 TABLET, DELAYED RELEASE ORAL
Refills: 0 | Status: DISCONTINUED | OUTPATIENT
Start: 2021-11-24 | End: 2021-11-26

## 2021-11-24 RX ORDER — APREPITANT 80 MG/1
40 CAPSULE ORAL ONCE
Refills: 0 | Status: COMPLETED | OUTPATIENT
Start: 2021-11-24 | End: 2021-11-24

## 2021-11-24 RX ORDER — ALBUTEROL 90 UG/1
1 AEROSOL, METERED ORAL
Refills: 0 | Status: DISCONTINUED | OUTPATIENT
Start: 2021-11-24 | End: 2021-11-26

## 2021-11-24 RX ORDER — HYDROMORPHONE HYDROCHLORIDE 2 MG/ML
0.5 INJECTION INTRAMUSCULAR; INTRAVENOUS; SUBCUTANEOUS
Refills: 0 | Status: DISCONTINUED | OUTPATIENT
Start: 2021-11-24 | End: 2021-11-26

## 2021-11-24 RX ADMIN — SODIUM CHLORIDE 500 MILLILITER(S): 9 INJECTION INTRAMUSCULAR; INTRAVENOUS; SUBCUTANEOUS at 10:15

## 2021-11-24 RX ADMIN — Medication 15 MILLIGRAM(S): at 18:15

## 2021-11-24 RX ADMIN — Medication 975 MILLIGRAM(S): at 13:37

## 2021-11-24 RX ADMIN — OXYCODONE HYDROCHLORIDE 10 MILLIGRAM(S): 5 TABLET ORAL at 21:04

## 2021-11-24 RX ADMIN — Medication 325 MILLIGRAM(S): at 17:48

## 2021-11-24 RX ADMIN — Medication 975 MILLIGRAM(S): at 06:28

## 2021-11-24 RX ADMIN — Medication 975 MILLIGRAM(S): at 23:23

## 2021-11-24 RX ADMIN — Medication 15 MILLIGRAM(S): at 17:48

## 2021-11-24 RX ADMIN — Medication 15 MILLIGRAM(S): at 23:24

## 2021-11-24 RX ADMIN — Medication 300 MILLIGRAM(S): at 17:47

## 2021-11-24 RX ADMIN — Medication 15 MILLIGRAM(S): at 12:39

## 2021-11-24 RX ADMIN — SENNA PLUS 2 TABLET(S): 8.6 TABLET ORAL at 22:36

## 2021-11-24 RX ADMIN — OXYCODONE HYDROCHLORIDE 10 MILLIGRAM(S): 5 TABLET ORAL at 16:36

## 2021-11-24 RX ADMIN — OXYCODONE HYDROCHLORIDE 10 MILLIGRAM(S): 5 TABLET ORAL at 16:06

## 2021-11-24 RX ADMIN — APREPITANT 40 MILLIGRAM(S): 80 CAPSULE ORAL at 06:28

## 2021-11-24 RX ADMIN — OXYCODONE HYDROCHLORIDE 10 MILLIGRAM(S): 5 TABLET ORAL at 23:23

## 2021-11-24 RX ADMIN — ONDANSETRON 4 MILLIGRAM(S): 8 TABLET, FILM COATED ORAL at 16:08

## 2021-11-24 RX ADMIN — Medication 5 MILLIGRAM(S): at 17:47

## 2021-11-24 RX ADMIN — Medication 15 MILLIGRAM(S): at 23:39

## 2021-11-24 RX ADMIN — Medication 2000 MILLIGRAM(S): at 09:50

## 2021-11-24 RX ADMIN — BUDESONIDE AND FORMOTEROL FUMARATE DIHYDRATE 2 PUFF(S): 160; 4.5 AEROSOL RESPIRATORY (INHALATION) at 21:37

## 2021-11-24 RX ADMIN — SODIUM CHLORIDE 100 MILLILITER(S): 9 INJECTION INTRAMUSCULAR; INTRAVENOUS; SUBCUTANEOUS at 22:36

## 2021-11-24 RX ADMIN — Medication 975 MILLIGRAM(S): at 22:37

## 2021-11-24 RX ADMIN — OXYCODONE HYDROCHLORIDE 10 MILLIGRAM(S): 5 TABLET ORAL at 20:04

## 2021-11-24 RX ADMIN — Medication 100 MILLIGRAM(S): at 17:47

## 2021-11-24 NOTE — DISCHARGE NOTE PROVIDER - NSDCMRMEDTOKEN_GEN_ALL_CORE_FT
Advair Diskus 500 mcg-50 mcg inhalation powder: 1 puff(s) inhaled 2 times a day  fluticasone 0.5 mg/2 mL inhalation suspension:   levothyroxine 50 mcg (0.05 mg) oral tablet: 1 tab(s) orally once a day  losartan-hydrochlorothiazide 100 mg-25 mg oral tablet: 1 tab(s) orally once a day  mupirocin 2% topical ointment: 1 application in each affected nostril 2 times a day   omeprazole 40 mg oral delayed release capsule: 1 cap(s) orally once a day  ProAir HFA 90 mcg/inh inhalation aerosol:   Singulair 10 mg oral tablet: 1 tab(s) orally once a day  trospium 60 mg oral capsule, extended release: 1 cap(s) orally once a day (in the morning)   acetaminophen 325 mg oral tablet: 3 tab(s) orally every 8 hours, As Needed  Advair Diskus 500 mcg-50 mcg inhalation powder: 1 puff(s) inhaled 2 times a day  Ecotrin 325 mg oral delayed release tablet: 1 tab(s) orally 2 times a day x 6 weeks - last dose on 1/5/22 MDD:2  fluticasone 0.5 mg/2 mL inhalation suspension:   levothyroxine 50 mcg (0.05 mg) oral tablet: 1 tab(s) orally once a day  losartan-hydrochlorothiazide 100 mg-25 mg oral tablet: 1 tab(s) orally once a day  omeprazole 40 mg oral delayed release capsule: 1 cap(s) orally once a day  oxyCODONE 10 mg oral tablet: 1 tab(s) orally every 4-6 hours PRN mod-severe pain MDD:6  ProAir HFA 90 mcg/inh inhalation aerosol:   Senna S 50 mg-8.6 mg oral tablet: 2 tab(s) orally once a day (at bedtime), As Needed -for constipation MDD:2  Singulair 10 mg oral tablet: 1 tab(s) orally once a day  trospium 60 mg oral capsule, extended release: 1 cap(s) orally once a day (in the morning)   acetaminophen 325 mg oral tablet: 3 tab(s) orally every 8 hours, As Needed  Advair Diskus 500 mcg-50 mcg inhalation powder: 1 puff(s) inhaled 2 times a day  Ecotrin 325 mg oral delayed release tablet: 1 tab(s) orally 2 times a day x 6 weeks - last dose on 1/5/22 MDD:2  fluticasone 0.5 mg/2 mL inhalation suspension:   levothyroxine 50 mcg (0.05 mg) oral tablet: 0.5 tab orally once a day and follow-up with Endocrinologist/PMD in 4-6 weeks  losartan-hydrochlorothiazide 100 mg-25 mg oral tablet: 1 tab(s) orally once a day  omeprazole 40 mg oral delayed release capsule: 1 cap(s) orally once a day  oxyCODONE 10 mg oral tablet: 1 tab(s) orally every 4-6 hours PRN mod-severe pain MDD:6  ProAir HFA 90 mcg/inh inhalation aerosol:   Senna S 50 mg-8.6 mg oral tablet: 2 tab(s) orally once a day (at bedtime), As Needed -for constipation MDD:2  Singulair 10 mg oral tablet: 1 tab(s) orally once a day  trospium 60 mg oral capsule, extended release: 1 cap(s) orally once a day (in the morning)

## 2021-11-24 NOTE — DISCHARGE NOTE PROVIDER - NSDCFUADDINST_GEN_ALL_CORE_FT
The patient will be seen in the office between 2-3 weeks for wound check.   **Your first post-operative visit has been scheduled prior to your admission. PLEASE CONTACT OFFICE TO CONFIRM THE APPOINTMENT DATE. Sutures/Staples/Tape will be removed at that time.  **  The silver based dressing is to be removed 7 days from the date of surgery.   ** CONTACT THE OFFICE IF THE FOLLOWING DEVELOP:  - the dressing becomes soiled or saturated  - you develop a fever greater that 101F  - the wound becomes red or you develop blistering around the wound  * Patient may shower after post-op day #3.   * The patient will continue home PT consistent with  total knee replacement protocol. Transition to outpatient PT will occur at the time of the first office visit.   * The patient will practice knee extension exercises regularly to minimize hamstring contraction.   * The patient is FULL weight bearing.  * The patient will continue ASPIRIN for 6 weeks after surgery for blood clot prevention.* While on aspirin, the patient will take daily omeprazole or other similar medication to protect the stomach from irritation.   * The patient will take OXYCODONE AND TYLENOL for pain control and adjust according to prescription and patient needs. Contact the office if pain increases while taking prescribed pain medications or related concerns develop.  * Celebrex will be taken twice daily for 3 weeks for pain control and prevention of excessive bone growth. Additional prescription may be requested at your office follow-up visit.   * The patient will take Senna S while taking oxycodone to prevent narcotic associated constipation.  Additionally, increase water intake (drink at least 8 glasses of water daily) and try adding fiber to the diet by eating fruits, vegetables and foods that are rich in grains. If constipation is experienced, contact the medical/primary care provider to discuss further treatment options.  * To avoid injury at home:  - continue use of rolling walker until cleared by physical therapist  - have family or friend remove all throw rug or objects in hallways that may present a trip hazard.  - if you experience any dizziness or medical concerns, call your medical doctor or  911.  * The implant may activate metal detection devices.  The patient will be seen in the office between 2-3 weeks for wound check.   **Your first post-operative visit has been scheduled prior to your admission. PLEASE CONTACT OFFICE TO CONFIRM THE APPOINTMENT DATE. Sutures/Staples/Tape will be removed at that time.  **  The silver based dressing is to be removed 7 days from the date of surgery.   ** CONTACT THE OFFICE IF THE FOLLOWING DEVELOP:  - the dressing becomes soiled or saturated  - you develop a fever greater that 101F  - the wound becomes red or you develop blistering around the wound  * Patient may shower after post-op day #3.   * The patient will continue home PT consistent with  total knee replacement protocol. Transition to outpatient PT will occur at the time of the first office visit.   * The patient will practice knee extension exercises regularly to minimize hamstring contraction.   * The patient is FULL weight bearing.  * The patient will continue ASPIRIN for 6 weeks after surgery for blood clot prevention.* While on aspirin, the patient will take daily omeprazole or other similar medication to protect the stomach from irritation.   * The patient will take OXYCODONE AND TYLENOL for pain control and adjust according to prescription and patient needs. Contact the office if pain increases while taking prescribed pain medications or related concerns develop.  * Celebrex will be taken twice daily for 3 weeks for pain control and prevention of excessive bone growth. Additional prescription may be requested at your office follow-up visit.   * The patient will take Senna S while taking oxycodone to prevent narcotic associated constipation.  Additionally, increase water intake (drink at least 8 glasses of water daily) and try adding fiber to the diet by eating fruits, vegetables and foods that are rich in grains. If constipation is experienced, contact the medical/primary care provider to discuss further treatment options.  * To avoid injury at home:  - continue use of rolling walker until cleared by physical therapist  - have family or friend remove all throw rug or objects in hallways that may present a trip hazard.  - if you experience any dizziness or medical concerns, call your medical doctor or  911.  * The implant may activate metal detection devices.     follow-up with Endocrinologist/PMD in 4-6 weeks for repeat thyroid function tests. For now, take Levothyroxine 50mg HALF TABLET once daily by mouth.

## 2021-11-24 NOTE — DISCHARGE NOTE PROVIDER - NSDCFUADDAPPT_GEN_ALL_CORE_FT
follow-up with Endocrinologist/PMD in 4-6 weeks for repeat thyroid function tests. For now, take Levothyroxine 50mg HALF TABLET once daily by mouth.

## 2021-11-24 NOTE — DISCHARGE NOTE PROVIDER - NSDCFUSCHEDAPPT_GEN_ALL_CORE_FT
[facility] ; NPP Rheum [address] [facility]  [facility] ; 12/15/2021 ; [facility] [address] [facility] ; [facility]/10[facility] ; NPP OB/ [facility]  [facility]/11[facility] ; [facility] [address] [facility] 02/16/[facility] ; NPP Ortho [place] [address] [facility]

## 2021-11-24 NOTE — CONSULT NOTE ADULT - SUBJECTIVE AND OBJECTIVE BOX
67 y/o F with Hx of Asthma last exacerbation 1 year ago, HTN, over active bladder, asthma and hypothyroidism endometrial cancer s/p hysterectomy, recently had pituitary adenoma removed by Dr Kumar at [facility] 9/21, she has left knee pain since Spring, pain is getting progressively worse, MRI of the knee revealed complete radial tear of the posterior horn of the medial meniscus, broad-based full-thickness chondral loss involving the majority of the central weightbearing portion of the medial femoral condyle medial tibial plateau, subchondral fracture with associated marrow edema within the outer aspect of the medial femoral condyle, mild to moderate medial patellofemoral arthrosis,  left knee total joint replacement with Dr De La Vega on 11/24/21 s/p procedure, Patient tolerated procedure well, patient's pain is well controlled, he has no chest pain, sob, dizziness, fever, chills, nausea, vomiting.     Allergies:  	sulfa drugs: Drug Category, Rash    Home Medications:   * Incomplete Medication History as of 22-Nov-2021 18:37 documented in Structured Notes  · 	losartan-hydrochlorothiazide 100 mg-25 mg oral tablet: Last Dose Taken:  , 1 tab(s) orally once a day  · 	Singulair 10 mg oral tablet: Last Dose Taken:  , 1 tab(s) orally once a day  · 	ProAir HFA 90 mcg/inh inhalation aerosol: Last Dose Taken:    · 	trospium 60 mg oral capsule, extended release: Last Dose Taken:  , 1 cap(s) orally once a day (in the morning)  · 	diclofenac sodium 75 mg oral delayed release tablet: Last Dose Taken:  , 1 tab(s) orally 2 times a day  · 	omeprazole 40 mg oral delayed release capsule: Last Dose Taken:  , 1 cap(s) orally once a day  · 	Advair Diskus 500 mcg-50 mcg inhalation powder: Last Dose Taken:  , 1 puff(s) inhaled 2 times a day  · 	levothyroxine 50 mcg (0.05 mg) oral tablet: Last Dose Taken:  , 1 tab(s) orally once a day      PAST MEDICAL HISTORY:  Acute pain left shoulder    Asthma     Endometrial cancer     GERD (gastroesophageal reflux disease)     Hypertension     IBS (irritable bowel syndrome)     Overactive bladder     Pituitary adenoma     Risk factors for obstructive sleep apnea     Unilateral primary osteoarthritis, left knee     Uterine polyp.     PAST SURGICAL HISTORY:  H/O total knee replacement, right     Pituitary adenoma s/p removal 9/21    S/P arthroscopy of shoulder     S/P cholecystectomy     S/P dilatation and curettage     S/P right knee arthroscopy     S/P tendon repair R hand    S/P total hysterectomy.     FAMILY HISTORY:  FH: colon cancer    Father  Still living? No  Family history of aneurysm, Age at diagnosis: Age Unknown  FH: diabetes mellitus, Age at diagnosis: Age Unknown  FH: hypertension, Age at diagnosis: Age Unknown  FH: myocardial infarction, Age at diagnosis: Age Unknown.     COVID-19 Vaccine Assessment:  · History of COVID-19 vaccination	Yes  · Brand of COVID-19 vaccination	Pfizer dose 1, 2, and 3  · Date of last vaccination	31-Mar-2021  · Additional Information	pre-op covid swab 11/21  · Have you had a COVID-19 booster?	Yes  · Brand of COVID-19 booster	Pfizer  · Date of latest COVID-19 booster	16-Oct-2021  · Will the patient accept the COVID-19 vaccine if eligible and it is available?	Not applicable     Social History:   67 y/o F with Hx of Asthma last exacerbation 1 year ago, HTN, over active bladder, asthma and hypothyroidism endometrial cancer s/p hysterectomy, recently had pituitary adenoma removed by Dr Kumar at [facility] 9/21, she has left knee pain since Spring, pain is getting progressively worse, MRI of the knee revealed complete radial tear of the posterior horn of the medial meniscus, broad-based full-thickness chondral loss involving the majority of the central weightbearing portion of the medial femoral condyle medial tibial plateau, subchondral fracture with associated marrow edema within the outer aspect of the medial femoral condyle, mild to moderate medial patellofemoral arthrosis,  left knee total joint replacement with Dr De La Vega on 11/24/21 s/p procedure, Patient tolerated procedure well, patient's pain is well controlled, he has no chest pain, sob, dizziness, fever, chills, nausea, vomiting.     REVIEW OF SYSTEMS:    CONSTITUTIONAL: No fever, some fatigue  RESPIRATORY: No cough, No shortness of breath  CARDIOVASCULAR: No chest pain, palpitations  GASTROINTESTINAL: No abdominal, No nausea, vomiting  NEUROLOGICAL: No headaches,  loss of strength.  MISCELLANEOUS: Left knee pain is well controlled     Allergies:  	sulfa drugs: Drug Category, Rash    Home Medications:   * Incomplete Medication History as of 22-Nov-2021 18:37 documented in Structured Notes  · 	losartan-hydrochlorothiazide 100 mg-25 mg oral tablet: Last Dose Taken:  , 1 tab(s) orally once a day  · 	Singulair 10 mg oral tablet: Last Dose Taken:  , 1 tab(s) orally once a day  · 	ProAir HFA 90 mcg/inh inhalation aerosol: Last Dose Taken:    · 	trospium 60 mg oral capsule, extended release: Last Dose Taken:  , 1 cap(s) orally once a day (in the morning)  · 	diclofenac sodium 75 mg oral delayed release tablet: Last Dose Taken:  , 1 tab(s) orally 2 times a day  · 	omeprazole 40 mg oral delayed release capsule: Last Dose Taken:  , 1 cap(s) orally once a day  · 	Advair Diskus 500 mcg-50 mcg inhalation powder: Last Dose Taken:  , 1 puff(s) inhaled 2 times a day  · 	levothyroxine 50 mcg (0.05 mg) oral tablet: Last Dose Taken:  , 1 tab(s) orally once a day      PAST MEDICAL HISTORY:    Acute pain left shoulder    Asthma     Endometrial cancer     GERD (gastroesophageal reflux disease)     Hypertension     IBS (irritable bowel syndrome)     Overactive bladder     Pituitary adenoma     Risk factors for obstructive sleep apnea     Unilateral primary osteoarthritis, left knee     Uterine polyp.     PAST SURGICAL HISTORY:  H/O total knee replacement, right     Pituitary adenoma s/p removal 9/21    S/P arthroscopy of shoulder     S/P cholecystectomy     S/P dilatation and curettage     S/P right knee arthroscopy     S/P tendon repair R hand    S/P total hysterectomy.     FAMILY HISTORY:  FH: colon cancer    Father  Still living? No  Family history of aneurysm, Age at diagnosis: Age Unknown  FH: diabetes mellitus, Age at diagnosis: Age Unknown  FH: hypertension, Age at diagnosis: Age Unknown  FH: myocardial infarction, Age at diagnosis: Age Unknown.     COVID-19 Vaccine Assessment:  · History of COVID-19 vaccination	Yes  · Brand of COVID-19 vaccination	Pfizer dose 1, 2, and 3  · Date of last vaccination	31-Mar-2021  · Additional Information	pre-op covid swab 11/21  · Have you had a COVID-19 booster?	Yes  · Brand of COVID-19 booster	Pfizer  · Date of latest COVID-19 booster	16-Oct-2021  · Will the patient accept the COVID-19 vaccine if eligible and it is available?	Not applicable     Social History:

## 2021-11-24 NOTE — DISCHARGE NOTE PROVIDER - HOSPITAL COURSE
The patient underwent a LEFT TOTAL KNEE REPLACEMENT on 11/24. The patient received antibiotics consistent with SCIP guidelines. The patient underwent the procedure and had no intra-operative complications. Post-operatively, the patient was seen by medicine and PT. The patient received ASPIRIN for DVTP. The patient received pain medications per orthopedic pain management pathway and the pain was appropriately controlled. The patient did not have any post-operative medical complications. The patient was discharged in stable condition.

## 2021-11-24 NOTE — CONSULT NOTE ADULT - ASSESSMENT
67 y/o F with Hx of Asthma last exacerbation 1 year ago, HTN, over active bladder, asthma and hypothyroidism endometrial cancer s/p hysterectomy, recently had pituitary adenoma removed by Dr Kumar at [facility] 9/21, she has left knee pain since Spring, pain is getting progressively worse, MRI of the knee revealed complete radial tear of the posterior horn of the medial meniscus, broad-based full-thickness chondral loss involving the majority of the central weightbearing portion of the medial femoral condyle medial tibial plateau, subchondral fracture with associated marrow edema within the outer aspect of the medial femoral condyle, mild to moderate medial patellofemoral arthrosis,  left knee total joint replacement with Dr De La Vega on 11/24/21 s/p procedure, Patient tolerated procedure well, patient's pain is well controlled, he has no chest pain, sob, dizziness, fever, chills, nausea, vomiting.     * Incomplete Medication History as of 22-Nov-2021 18:37 documented in Structured Notes  · 	losartan-hydrochlorothiazide 100 mg-25 mg oral tablet: Last Dose Taken:  , 1 tab(s) orally once a day  · 	Singulair 10 mg oral tablet: Last Dose Taken:  , 1 tab(s) orally once a day  · 	ProAir HFA 90 mcg/inh inhalation aerosol: Last Dose Taken:    · 	trospium 60 mg oral capsule, extended release: Last Dose Taken:  , 1 cap(s) orally once a day (in the morning)  · 	diclofenac sodium 75 mg oral delayed release tablet: Last Dose Taken:  , 1 tab(s) orally 2 times a day  · 	omeprazole 40 mg oral delayed release capsule: Last Dose Taken:  , 1 cap(s) orally once a day  · 	Advair Diskus 500 mcg-50 mcg inhalation powder: Last Dose Taken:  , 1 puff(s) inhaled 2 times a day  · 	levothyroxine 50 mcg (0.05 mg) oral tablet: Last Dose Taken:  , 1 tab(s) orally once a day 65 y/o F with Hx of Asthma last exacerbation 1 year ago, HTN, over active bladder, asthma and hypothyroidism endometrial cancer s/p hysterectomy, recently had pituitary adenoma removed by Dr Kumar at [facility] 9/21, she has left knee pain since Spring, pain is getting progressively worse, MRI of the knee revealed complete radial tear of the posterior horn of the medial meniscus, broad-based full-thickness chondral loss involving the majority of the central weightbearing portion of the medial femoral condyle medial tibial plateau, subchondral fracture with associated marrow edema within the outer aspect of the medial femoral condyle, mild to moderate medial patellofemoral arthrosis,  left knee total joint replacement with Dr De La Vega on 11/24/21 s/p procedure    Plan:     Left knee OA s/p TKR post op day 0:       Pain meds and DVT prophylaxis as per Ortho service  Bowel meds  IV fluids  Monitor CBC  PT eval.   will monitor vitals if hypotensive will give bolus of fluids.     HTN: losartan-hydrochlorothiazide 100 mg-25 mg once a day with holding parameters     Asthma: will continue with Singulair 10 mg once a day, ProAir HFA 90 mcg/inh as needed, Advair Diskus 500 mcg-50 mcg 1 puff(s) inhaled 2 times a day.     Overactive urinary bladder: Trospium 60 mg once a day (in the morning)    GERD: Omeprazole 40 mg 1 cap(s) orally once a day.     Hypothyroidism: Levothyroxine 50 mcg once a day.

## 2021-11-25 ENCOUNTER — TRANSCRIPTION ENCOUNTER (OUTPATIENT)
Age: 66
End: 2021-11-25

## 2021-11-25 LAB
ANION GAP SERPL CALC-SCNC: 14 MMOL/L — SIGNIFICANT CHANGE UP (ref 5–17)
BUN SERPL-MCNC: 16 MG/DL — SIGNIFICANT CHANGE UP (ref 8–20)
CALCIUM SERPL-MCNC: 9.7 MG/DL — SIGNIFICANT CHANGE UP (ref 8.6–10.2)
CHLORIDE SERPL-SCNC: 103 MMOL/L — SIGNIFICANT CHANGE UP (ref 98–107)
CO2 SERPL-SCNC: 21 MMOL/L — LOW (ref 22–29)
COVID-19 NUCLEOCAPSID GAM AB INTERP: NEGATIVE — SIGNIFICANT CHANGE UP
COVID-19 NUCLEOCAPSID TOTAL GAM ANTIBODY RESULT: 0.07 INDEX — SIGNIFICANT CHANGE UP
COVID-19 SPIKE DOMAIN AB INTERP: POSITIVE
COVID-19 SPIKE DOMAIN ANTIBODY RESULT: >250 U/ML — HIGH
CREAT SERPL-MCNC: 0.68 MG/DL — SIGNIFICANT CHANGE UP (ref 0.5–1.3)
GLUCOSE SERPL-MCNC: 94 MG/DL — SIGNIFICANT CHANGE UP (ref 70–99)
HCT VFR BLD CALC: 36.2 % — SIGNIFICANT CHANGE UP (ref 34.5–45)
HGB BLD-MCNC: 11.2 G/DL — LOW (ref 11.5–15.5)
MCHC RBC-ENTMCNC: 28.9 PG — SIGNIFICANT CHANGE UP (ref 27–34)
MCHC RBC-ENTMCNC: 30.9 GM/DL — LOW (ref 32–36)
MCV RBC AUTO: 93.5 FL — SIGNIFICANT CHANGE UP (ref 80–100)
PLATELET # BLD AUTO: 344 K/UL — SIGNIFICANT CHANGE UP (ref 150–400)
POTASSIUM SERPL-MCNC: 4.4 MMOL/L — SIGNIFICANT CHANGE UP (ref 3.5–5.3)
POTASSIUM SERPL-SCNC: 4.4 MMOL/L — SIGNIFICANT CHANGE UP (ref 3.5–5.3)
RBC # BLD: 3.87 M/UL — SIGNIFICANT CHANGE UP (ref 3.8–5.2)
RBC # FLD: 12.5 % — SIGNIFICANT CHANGE UP (ref 10.3–14.5)
SARS-COV-2 IGG+IGM SERPL QL IA: 0.07 INDEX — SIGNIFICANT CHANGE UP
SARS-COV-2 IGG+IGM SERPL QL IA: >250 U/ML — HIGH
SARS-COV-2 IGG+IGM SERPL QL IA: NEGATIVE — SIGNIFICANT CHANGE UP
SARS-COV-2 IGG+IGM SERPL QL IA: POSITIVE
SODIUM SERPL-SCNC: 137 MMOL/L — SIGNIFICANT CHANGE UP (ref 135–145)
WBC # BLD: 16.12 K/UL — HIGH (ref 3.8–10.5)
WBC # FLD AUTO: 16.12 K/UL — HIGH (ref 3.8–10.5)

## 2021-11-25 PROCEDURE — 99232 SBSQ HOSP IP/OBS MODERATE 35: CPT

## 2021-11-25 RX ADMIN — BUDESONIDE AND FORMOTEROL FUMARATE DIHYDRATE 2 PUFF(S): 160; 4.5 AEROSOL RESPIRATORY (INHALATION) at 08:40

## 2021-11-25 RX ADMIN — Medication 50 MILLIGRAM(S): at 21:45

## 2021-11-25 RX ADMIN — OXYCODONE HYDROCHLORIDE 10 MILLIGRAM(S): 5 TABLET ORAL at 09:38

## 2021-11-25 RX ADMIN — Medication 975 MILLIGRAM(S): at 14:29

## 2021-11-25 RX ADMIN — Medication 5 MILLIGRAM(S): at 05:45

## 2021-11-25 RX ADMIN — Medication 975 MILLIGRAM(S): at 22:44

## 2021-11-25 RX ADMIN — Medication 15 MILLIGRAM(S): at 05:46

## 2021-11-25 RX ADMIN — Medication 100 MILLIGRAM(S): at 08:52

## 2021-11-25 RX ADMIN — OXYCODONE HYDROCHLORIDE 10 MILLIGRAM(S): 5 TABLET ORAL at 13:11

## 2021-11-25 RX ADMIN — OXYCODONE HYDROCHLORIDE 10 MILLIGRAM(S): 5 TABLET ORAL at 03:30

## 2021-11-25 RX ADMIN — Medication 100 MILLIGRAM(S): at 00:07

## 2021-11-25 RX ADMIN — OXYCODONE HYDROCHLORIDE 10 MILLIGRAM(S): 5 TABLET ORAL at 02:30

## 2021-11-25 RX ADMIN — OXYCODONE HYDROCHLORIDE 10 MILLIGRAM(S): 5 TABLET ORAL at 21:44

## 2021-11-25 RX ADMIN — OXYCODONE HYDROCHLORIDE 10 MILLIGRAM(S): 5 TABLET ORAL at 15:14

## 2021-11-25 RX ADMIN — Medication 50 MILLIGRAM(S): at 10:12

## 2021-11-25 RX ADMIN — Medication 975 MILLIGRAM(S): at 05:45

## 2021-11-25 RX ADMIN — SENNA PLUS 2 TABLET(S): 8.6 TABLET ORAL at 21:44

## 2021-11-25 RX ADMIN — OXYCODONE HYDROCHLORIDE 10 MILLIGRAM(S): 5 TABLET ORAL at 22:44

## 2021-11-25 RX ADMIN — OXYCODONE HYDROCHLORIDE 10 MILLIGRAM(S): 5 TABLET ORAL at 06:45

## 2021-11-25 RX ADMIN — OXYCODONE HYDROCHLORIDE 10 MILLIGRAM(S): 5 TABLET ORAL at 18:13

## 2021-11-25 RX ADMIN — PANTOPRAZOLE SODIUM 40 MILLIGRAM(S): 20 TABLET, DELAYED RELEASE ORAL at 05:46

## 2021-11-25 RX ADMIN — OXYCODONE HYDROCHLORIDE 10 MILLIGRAM(S): 5 TABLET ORAL at 00:23

## 2021-11-25 RX ADMIN — Medication 15 MILLIGRAM(S): at 06:00

## 2021-11-25 RX ADMIN — Medication 50 MICROGRAM(S): at 05:45

## 2021-11-25 RX ADMIN — Medication 5 MILLIGRAM(S): at 17:26

## 2021-11-25 RX ADMIN — OXYCODONE HYDROCHLORIDE 10 MILLIGRAM(S): 5 TABLET ORAL at 18:29

## 2021-11-25 RX ADMIN — Medication 975 MILLIGRAM(S): at 15:04

## 2021-11-25 RX ADMIN — BUDESONIDE AND FORMOTEROL FUMARATE DIHYDRATE 2 PUFF(S): 160; 4.5 AEROSOL RESPIRATORY (INHALATION) at 21:05

## 2021-11-25 RX ADMIN — Medication 975 MILLIGRAM(S): at 21:44

## 2021-11-25 RX ADMIN — Medication 975 MILLIGRAM(S): at 06:45

## 2021-11-25 RX ADMIN — OXYCODONE HYDROCHLORIDE 10 MILLIGRAM(S): 5 TABLET ORAL at 16:06

## 2021-11-25 RX ADMIN — Medication 325 MILLIGRAM(S): at 17:26

## 2021-11-25 RX ADMIN — Medication 325 MILLIGRAM(S): at 05:45

## 2021-11-25 RX ADMIN — OXYCODONE HYDROCHLORIDE 10 MILLIGRAM(S): 5 TABLET ORAL at 12:11

## 2021-11-25 RX ADMIN — MONTELUKAST 10 MILLIGRAM(S): 4 TABLET, CHEWABLE ORAL at 11:48

## 2021-11-25 RX ADMIN — OXYCODONE HYDROCHLORIDE 10 MILLIGRAM(S): 5 TABLET ORAL at 05:45

## 2021-11-25 RX ADMIN — OXYCODONE HYDROCHLORIDE 10 MILLIGRAM(S): 5 TABLET ORAL at 08:58

## 2021-11-25 NOTE — DISCHARGE NOTE NURSING/CASE MANAGEMENT/SOCIAL WORK - PATIENT PORTAL LINK FT
You can access the FollowMyHealth Patient Portal offered by [facility] by registering at the following website: [email or web address]. By joining [employer]’s FollowMyHealth portal, you will also be able to view your health information using other applications (apps) compatible with our system.

## 2021-11-26 VITALS
TEMPERATURE: 99 F | HEART RATE: 112 BPM | SYSTOLIC BLOOD PRESSURE: 124 MMHG | DIASTOLIC BLOOD PRESSURE: 84 MMHG | OXYGEN SATURATION: 94 % | RESPIRATION RATE: 18 BRPM

## 2021-11-26 LAB
ANION GAP SERPL CALC-SCNC: 15 MMOL/L — SIGNIFICANT CHANGE UP (ref 5–17)
BUN SERPL-MCNC: 13.6 MG/DL — SIGNIFICANT CHANGE UP (ref 8–20)
CALCIUM SERPL-MCNC: 9.3 MG/DL — SIGNIFICANT CHANGE UP (ref 8.6–10.2)
CHLORIDE SERPL-SCNC: 102 MMOL/L — SIGNIFICANT CHANGE UP (ref 98–107)
CO2 SERPL-SCNC: 23 MMOL/L — SIGNIFICANT CHANGE UP (ref 22–29)
CREAT SERPL-MCNC: 0.68 MG/DL — SIGNIFICANT CHANGE UP (ref 0.5–1.3)
GLUCOSE SERPL-MCNC: 96 MG/DL — SIGNIFICANT CHANGE UP (ref 70–99)
HCT VFR BLD CALC: 36.9 % — SIGNIFICANT CHANGE UP (ref 34.5–45)
HGB BLD-MCNC: 11.3 G/DL — LOW (ref 11.5–15.5)
MCHC RBC-ENTMCNC: 28.8 PG — SIGNIFICANT CHANGE UP (ref 27–34)
MCHC RBC-ENTMCNC: 30.6 GM/DL — LOW (ref 32–36)
MCV RBC AUTO: 94.1 FL — SIGNIFICANT CHANGE UP (ref 80–100)
PLATELET # BLD AUTO: 350 K/UL — SIGNIFICANT CHANGE UP (ref 150–400)
POTASSIUM SERPL-MCNC: 4.1 MMOL/L — SIGNIFICANT CHANGE UP (ref 3.5–5.3)
POTASSIUM SERPL-SCNC: 4.1 MMOL/L — SIGNIFICANT CHANGE UP (ref 3.5–5.3)
RBC # BLD: 3.92 M/UL — SIGNIFICANT CHANGE UP (ref 3.8–5.2)
RBC # FLD: 12.9 % — SIGNIFICANT CHANGE UP (ref 10.3–14.5)
SODIUM SERPL-SCNC: 140 MMOL/L — SIGNIFICANT CHANGE UP (ref 135–145)
TSH SERPL-MCNC: <0.1 UIU/ML — LOW (ref 0.27–4.2)
TSH SERPL-MCNC: <0.1 UIU/ML — LOW (ref 0.27–4.2)
WBC # BLD: 14.25 K/UL — HIGH (ref 3.8–10.5)
WBC # FLD AUTO: 14.25 K/UL — HIGH (ref 3.8–10.5)

## 2021-11-26 PROCEDURE — 86769 SARS-COV-2 COVID-19 ANTIBODY: CPT

## 2021-11-26 PROCEDURE — 36415 COLL VENOUS BLD VENIPUNCTURE: CPT

## 2021-11-26 PROCEDURE — 97110 THERAPEUTIC EXERCISES: CPT

## 2021-11-26 PROCEDURE — 97163 PT EVAL HIGH COMPLEX 45 MIN: CPT

## 2021-11-26 PROCEDURE — 80048 BASIC METABOLIC PNL TOTAL CA: CPT

## 2021-11-26 PROCEDURE — C1776: CPT

## 2021-11-26 PROCEDURE — 97116 GAIT TRAINING THERAPY: CPT

## 2021-11-26 PROCEDURE — 73560 X-RAY EXAM OF KNEE 1 OR 2: CPT

## 2021-11-26 PROCEDURE — 82962 GLUCOSE BLOOD TEST: CPT

## 2021-11-26 PROCEDURE — 84443 ASSAY THYROID STIM HORMONE: CPT

## 2021-11-26 PROCEDURE — C1713: CPT

## 2021-11-26 PROCEDURE — 94640 AIRWAY INHALATION TREATMENT: CPT

## 2021-11-26 PROCEDURE — 99232 SBSQ HOSP IP/OBS MODERATE 35: CPT

## 2021-11-26 PROCEDURE — 85027 COMPLETE CBC AUTOMATED: CPT

## 2021-11-26 RX ORDER — ASPIRIN/CALCIUM CARB/MAGNESIUM 324 MG
1 TABLET ORAL
Qty: 84 | Refills: 0
Start: 2021-11-26 | End: 2022-01-06

## 2021-11-26 RX ORDER — ACETAMINOPHEN 500 MG
3 TABLET ORAL
Qty: 0 | Refills: 0 | DISCHARGE
Start: 2021-11-26

## 2021-11-26 RX ORDER — OXYCODONE HYDROCHLORIDE 5 MG/1
1 TABLET ORAL
Qty: 40 | Refills: 0
Start: 2021-11-26

## 2021-11-26 RX ORDER — SENNOSIDES/DOCUSATE SODIUM 8.6MG-50MG
2 TABLET ORAL
Qty: 20 | Refills: 0
Start: 2021-11-26

## 2021-11-26 RX ORDER — LEVOTHYROXINE SODIUM 125 MCG
1 TABLET ORAL
Qty: 0 | Refills: 0 | DISCHARGE

## 2021-11-26 RX ADMIN — OXYCODONE HYDROCHLORIDE 10 MILLIGRAM(S): 5 TABLET ORAL at 01:52

## 2021-11-26 RX ADMIN — Medication 975 MILLIGRAM(S): at 13:58

## 2021-11-26 RX ADMIN — OXYCODONE HYDROCHLORIDE 10 MILLIGRAM(S): 5 TABLET ORAL at 11:25

## 2021-11-26 RX ADMIN — OXYCODONE HYDROCHLORIDE 10 MILLIGRAM(S): 5 TABLET ORAL at 04:01

## 2021-11-26 RX ADMIN — Medication 25 MILLIGRAM(S): at 05:42

## 2021-11-26 RX ADMIN — PANTOPRAZOLE SODIUM 40 MILLIGRAM(S): 20 TABLET, DELAYED RELEASE ORAL at 05:41

## 2021-11-26 RX ADMIN — OXYCODONE HYDROCHLORIDE 10 MILLIGRAM(S): 5 TABLET ORAL at 10:36

## 2021-11-26 RX ADMIN — BUDESONIDE AND FORMOTEROL FUMARATE DIHYDRATE 2 PUFF(S): 160; 4.5 AEROSOL RESPIRATORY (INHALATION) at 09:50

## 2021-11-26 RX ADMIN — Medication 5 MILLIGRAM(S): at 05:42

## 2021-11-26 RX ADMIN — Medication 975 MILLIGRAM(S): at 14:11

## 2021-11-26 RX ADMIN — Medication 325 MILLIGRAM(S): at 05:42

## 2021-11-26 RX ADMIN — Medication 50 MICROGRAM(S): at 05:42

## 2021-11-26 RX ADMIN — Medication 975 MILLIGRAM(S): at 05:42

## 2021-11-26 RX ADMIN — OXYCODONE HYDROCHLORIDE 10 MILLIGRAM(S): 5 TABLET ORAL at 13:58

## 2021-11-26 RX ADMIN — LOSARTAN POTASSIUM 100 MILLIGRAM(S): 100 TABLET, FILM COATED ORAL at 05:41

## 2021-11-26 RX ADMIN — MONTELUKAST 10 MILLIGRAM(S): 4 TABLET, CHEWABLE ORAL at 10:35

## 2021-11-26 RX ADMIN — OXYCODONE HYDROCHLORIDE 10 MILLIGRAM(S): 5 TABLET ORAL at 05:00

## 2021-11-26 RX ADMIN — OXYCODONE HYDROCHLORIDE 10 MILLIGRAM(S): 5 TABLET ORAL at 14:50

## 2021-11-26 RX ADMIN — OXYCODONE HYDROCHLORIDE 10 MILLIGRAM(S): 5 TABLET ORAL at 07:34

## 2021-11-26 RX ADMIN — OXYCODONE HYDROCHLORIDE 10 MILLIGRAM(S): 5 TABLET ORAL at 08:25

## 2021-11-26 RX ADMIN — OXYCODONE HYDROCHLORIDE 10 MILLIGRAM(S): 5 TABLET ORAL at 00:52

## 2021-11-26 RX ADMIN — Medication 50 MILLIGRAM(S): at 04:01

## 2021-11-26 NOTE — PROGRESS NOTE ADULT - ASSESSMENT
65 y/o F with Hx of Asthma last exacerbation 1 year ago, HTN, over active bladder, asthma and hypothyroidism endometrial cancer s/p hysterectomy, recently had pituitary adenoma removed by Dr Kumar at [facility] 9/21, she has left knee pain since Spring, pain is getting progressively worse, MRI of the knee revealed complete radial tear of the posterior horn of the medial meniscus, broad-based full-thickness chondral loss involving the majority of the central weightbearing portion of the medial femoral condyle medial tibial plateau, subchondral fracture with associated marrow edema within the outer aspect of the medial femoral condyle, mild to moderate medial patellofemoral arthrosis,  left knee total joint replacement with Dr De La Vega on 11/24/21 s/p procedure    Plan:     Left knee OA s/p TKR post op day 2:     PT/OT/pain mgmt  DVT prophylaxis- as per ortho  Abx as per SCIP- given   Incentive spirometry  Prophylaxis of opioid  induced constipation.      HTN: losartan-hydrochlorothiazide 100 mg-25 mg once a day with holding parameters     Asthma: will continue with Singulair 10 mg once a day, ProAir HFA 90 mcg/inh as needed, Advair Diskus 500 mcg-50 mcg 1 puff(s) inhaled 2 times a day.   Stable .     Overactive urinary bladder: Trospium 60 mg once a day (in the morning)    GERD: Omeprazole 40 mg 1 cap(s) orally once a day.     Hypothyroidism - continue home dose Synthroid     Postop Leucocytosis - no S/S of infection - likely reactive ,   follow up todays CBC     Hypothyroidism: Levothyroxine 50 mcg once a day,   noted with episodes of tachycardia , follow TSH     Episodes of asymptomatic tachycardia noted , Preop EKG also with   tachycardia - weill check TSH ,may be due to pain . Pain control .      DVT prophylaxis  - as per ortho protocol- on ASA BID .   Opioid induced constipation  prophylaxis - bowel regimen     Dispo plan is Home likely today pending labs   D/W nurse / ortho PA .   If labs acceptable:    Medically stable to d/c once cleared by physical therapy / ortho . 
65 y/o F with Hx of Asthma last exacerbation 1 year ago, HTN, over active bladder, asthma and hypothyroidism endometrial cancer s/p hysterectomy, recently had pituitary adenoma removed by Dr Kumar at [facility] 9/21, she has left knee pain since Spring, pain is getting progressively worse, MRI of the knee revealed complete radial tear of the posterior horn of the medial meniscus, broad-based full-thickness chondral loss involving the majority of the central weightbearing portion of the medial femoral condyle medial tibial plateau, subchondral fracture with associated marrow edema within the outer aspect of the medial femoral condyle, mild to moderate medial patellofemoral arthrosis,  left knee total joint replacement with Dr De La Vega on 11/24/21 s/p procedure    Plan:     Left knee OA s/p TKR post op day 01:    PT/OT/pain mgmt  DVT prophylaxis- as per ortho  Abx as per SCIP- given   Incentive spirometry  Prophylaxis of opioid  induced constipation.      HTN: losartan-hydrochlorothiazide 100 mg-25 mg once a day with holding parameters     Asthma: will continue with Singulair 10 mg once a day, ProAir HFA 90 mcg/inh as needed, Advair Diskus 500 mcg-50 mcg 1 puff(s) inhaled 2 times a day.   Stable .     Overactive urinary bladder: Trospium 60 mg once a day (in the morning)    GERD: Omeprazole 40 mg 1 cap(s) orally once a day.     Hypothyroidism - continue home dose Synthroid     Postop Leucocytosis - no S/S of infection - likely reactive     Hypothyroidism: Levothyroxine 50 mcg once a day.     DVT prophylaxis  - as per ortho protocol  Opioid induced constipation  prophylaxis - bowel regimen     Dispo plan is Home likely in AM .   D/W nurse / ortho PA .    Medically stable to d/c once cleared by physical therapy / ortho .

## 2021-11-26 NOTE — PROGRESS NOTE ADULT - SUBJECTIVE AND OBJECTIVE BOX
DELISA EAGLE  [id number]    History: Patient seen and eval at bedside. Patient is doing well and is comfortable. The patient's pain is controlled using the prescribed pain medications. The patient is participating in physical therapy. Denies nausea, vomiting, chest pain, shortness of breath, abdominal pain or fever.    T(C): 37 (11-26-21 @ 04:54), Max: 37 (11-26-21 @ 04:54)  HR: 113 (11-26-21 @ 04:54) (67 - 113)  BP: 137/78 (11-26-21 @ 04:54) (124/77 - 137/78)  RR: 18 (11-26-21 @ 04:54) (18 - 18)  SpO2: 94% (11-26-21 @ 04:54) (93% - 98%)                          11.2   16.12 )-----------( 344      ( 25 Nov 2021 07:54 )             36.2     11-25    137  |  103  |  16.0  ----------------------------<  94  4.4   |  21.0<L>  |  0.68    Ca    9.7      25 Nov 2021 07:54        PE: NAD, alert, awake  Left LE:   Knee dressing C/D/I, no drainage, no bleeding  EHL/TA/FHL/GS intact, DP pulse 2+  Gross sensation to LT intact distally s/s/DP/SP/tib distrib, Calf soft, NT B/L    Primary Orthopedic Assessment:  •s/p LEFT total knee replacement POD#2    Plan:   DVT prophylaxis as prescribed - ASA, including use of compression devices and ankle pumps  Continue physical therapy: Weightbearing as tolerated of the left lower extremity with assistance of a walker  Incentive spirometry encouraged  Pain control as clinically indicated  Med following - will discuss regarding tachycardia  Discharge planning: home today pending PT and med clearance
Ortho Post Op Check    Name: DELISA EAGLE    MR #: [id number]    Procedure:  Left total knee arthroplasty   Surgeon: Dr De La Vega    PAST MEDICAL & SURGICAL HISTORY:  Hypertension    Asthma    GERD (gastroesophageal reflux disease)    Overactive bladder    Acute pain  left shoulder    Uterine polyp    Endometrial cancer    Risk factors for obstructive sleep apnea    IBS (irritable bowel syndrome)    Pituitary adenoma    Unilateral primary osteoarthritis, left knee    S/P right knee arthroscopy    S/P arthroscopy of shoulder    S/P cholecystectomy    S/P dilatation and curettage    S/P tendon repair  R hand    S/P total hysterectomy    Pituitary adenoma  s/p removal 9/21    H/O total knee replacement, right        Pt comfortable without complaints, pain controlled. States she is taking the mild level pain medication Q3 hours to try and keep the pain controllable. Patient states she was not on any restrictions or medication regarding the pituitary adenoma resection in Sept 2021  Denies CP, SOB, N/V, numbness/tingling     General Exam:  Vital Signs Last 24 Hrs  T(C): 36.4 (11-25-21 @ 05:05), Max: 36.4 (11-25-21 @ 05:05)  T(F): 97.6 (11-25-21 @ 05:05), Max: 97.6 (11-25-21 @ 05:05)  HR: 98 (11-25-21 @ 05:05) (98 - 98)  BP: 119/78 (11-25-21 @ 05:05) (119/78 - 119/78)  BP(mean): --  RR: 18 (11-25-21 @ 05:05) (18 - 18)  SpO2: 95% (11-25-21 @ 05:05) (95% - 95%)    General: Pt Alert and oriented, NAD, controlled pain.  Left leg ace wrap dressings C/D/I. No bleeding.  Pulses: 2+ dorsalis pedis pulse. Cap refill < 2 sec.  Sensation: Grossly intact to light touch without deficit.  Motor: + EHL/FHL/TA/GS  Calf soft, supple and nontender     MEDICATIONS  (STANDING):  acetaminophen     Tablet .. 975 milliGRAM(s) Oral every 8 hours  aspirin 325 milliGRAM(s) Oral two times a day  budesonide 160 MICROgram(s)/formoterol 4.5 MICROgram(s) Inhaler 2 Puff(s) Inhalation two times a day  ceFAZolin   IVPB      ceFAZolin   IVPB 2000 milliGRAM(s) IV Intermittent every 8 hours  levothyroxine 50 MICROGram(s) Oral daily  montelukast 10 milliGRAM(s) Oral daily  oxybutynin 5 milliGRAM(s) Oral two times a day  pantoprazole    Tablet 40 milliGRAM(s) Oral before breakfast  senna 2 Tablet(s) Oral at bedtime  sodium chloride 0.9%. 1000 milliLiter(s) (100 mL/Hr) IV Continuous <Continuous>  vancomycin  IVPB 1500 milliGRAM(s) IV Intermittent once    MEDICATIONS  (PRN):  ALBUTerol    90 MICROgram(s) HFA Inhaler 1 Puff(s) Inhalation two times a day PRN Wheezing  aluminum hydroxide/magnesium hydroxide/simethicone Suspension 30 milliLiter(s) Oral four times a day PRN Indigestion  diphenhydrAMINE 50 milliGRAM(s) Oral every 6 hours PRN Rash and/or Itching  HYDROmorphone   Tablet 4 milliGRAM(s) Oral every 3 hours PRN Severe Pain (7 - 10)  HYDROmorphone  Injectable 0.5 milliGRAM(s) IV Push every 3 hours PRN breakthrough pain  magnesium hydroxide Suspension 30 milliLiter(s) Oral daily PRN Constipation  ondansetron Injectable 4 milliGRAM(s) IV Push every 6 hours PRN Nausea and/or Vomiting  oxyCODONE    IR 5 milliGRAM(s) Oral every 3 hours PRN Mild Pain (1 - 3)  oxyCODONE    IR 10 milliGRAM(s) Oral every 3 hours PRN Moderate Pain (4 - 6)      Post-op X-Ray:    EXAM:  XR KNEE 1-2 VIEWS LT                          PROCEDURE DATE:  11/24/2021          INTERPRETATION:  Clinical history: 66-year-old female, TKA.    Two views of the left knee without comparison demonstrate that the patient is post total arthroplasty, prosthesis is in satisfactory position.    IMPRESSION:    Unremarkable postoperative views    --- End of Report ---            SUSAN GUARDADO DO; Attending Radiologist  This document has been electronically signed. Nov 24 2021  5:01PM      A/P: 66yFemale POD#1 s/p Left total knee arthroplasty    - Stable  - Pain Control  - DVT ppx: ASA  - Weight bearing status: WBAT  - DC to home tomorrow 
Patient seen and examined . S/p L TKA   , POD # 2. C/O increased pain over night , now better controlled  , no n/v ,   voiding without difficulty , participating with physical therapy .      CC : L knee chronic pain postop well controlled       MEDICATIONS  (STANDING):  acetaminophen     Tablet .. 975 milliGRAM(s) Oral every 8 hours  aspirin 325 milliGRAM(s) Oral two times a day  budesonide 160 MICROgram(s)/formoterol 4.5 MICROgram(s) Inhaler 2 Puff(s) Inhalation two times a day  hydrochlorothiazide 25 milliGRAM(s) Oral daily  levothyroxine 50 MICROGram(s) Oral daily  losartan 100 milliGRAM(s) Oral daily  montelukast 10 milliGRAM(s) Oral daily  oxybutynin 5 milliGRAM(s) Oral two times a day  pantoprazole    Tablet 40 milliGRAM(s) Oral before breakfast  senna 2 Tablet(s) Oral at bedtime  sodium chloride 0.9%. 1000 milliLiter(s) (100 mL/Hr) IV Continuous <Continuous>  vancomycin  IVPB 1500 milliGRAM(s) IV Intermittent once    MEDICATIONS  (PRN):  ALBUTerol    90 MICROgram(s) HFA Inhaler 1 Puff(s) Inhalation two times a day PRN Wheezing  aluminum hydroxide/magnesium hydroxide/simethicone Suspension 30 milliLiter(s) Oral four times a day PRN Indigestion  bisacodyl Suppository 10 milliGRAM(s) Rectal once PRN Constipation  diphenhydrAMINE 50 milliGRAM(s) Oral every 6 hours PRN Rash and/or Itching  HYDROmorphone   Tablet 4 milliGRAM(s) Oral every 3 hours PRN Severe Pain (7 - 10)  HYDROmorphone  Injectable 0.5 milliGRAM(s) IV Push every 3 hours PRN breakthrough pain  magnesium hydroxide Suspension 30 milliLiter(s) Oral daily PRN Constipation  ondansetron Injectable 4 milliGRAM(s) IV Push every 6 hours PRN Nausea and/or Vomiting  oxyCODONE    IR 5 milliGRAM(s) Oral every 3 hours PRN Mild Pain (1 - 3)  oxyCODONE    IR 10 milliGRAM(s) Oral every 3 hours PRN Moderate Pain (4 - 6)      LABS:                          11.2   16.12 )-----------( 344      ( 25 Nov 2021 07:54 )             36.2     11-25    137  |  103  |  16.0  ----------------------------<  94  4.4   |  21.0<L>  |  0.68    Ca    9.7      25 Nov 2021 07:54      REVIEW OF SYSTEMS:    As above , all other systems are reviewed and are negative     Vital Signs Last 24 Hrs  T(C): 37 (26 Nov 2021 04:54), Max: 37 (26 Nov 2021 04:54)  T(F): 98.6 (26 Nov 2021 04:54), Max: 98.6 (26 Nov 2021 04:54)  HR: 113 (26 Nov 2021 04:54) (67 - 113)  BP: 137/78 (26 Nov 2021 04:54) (129/75 - 137/78)  BP(mean): --  RR: 18 (26 Nov 2021 04:54) (18 - 18)  SpO2: 94% (26 Nov 2021 04:54) (93% - 98%)    PHYSICAL EXAM:    GENERAL: NAD, well-groomed, well-developed  HEAD:  Atraumatic, Normocephalic  EYES: EOMI, PERRLA, conjunctiva and sclera clear  NECK: Supple, No JVD, Normal thyroid  NERVOUS SYSTEM:  Alert & Oriented X3, no focal deficit  CHEST/LUNG: CTA b/l ,  no  rales, rhonchi, wheezing, or rubs  HEART: Regular rate and rhythm; No murmurs, rubs, or gallops  ABDOMEN: Soft, Nontender, Nondistended; Bowel sounds present  EXTREMITIES:  2+ Peripheral Pulses, No clubbing, cyanosis, or edema,   L knee dressing + , clean and dry   LYMPH: No lymphadenopathy noted  SKIN: No rashes or lesions  
  History: Patient is status post left total knee arthroplasty on 11/24, POD # 0.   Patient is doing well and is comfortable.   The patient's pain is controlled using the prescribed pain medications. Patient urinated on ace wrap.   Denies nausea, vomiting, chest pain, shortness of breath, abdominal pain or fever. No new complaints.    Vital Signs Last 24 Hrs  T(C): 36.6 (24 Nov 2021 14:59), Max: 36.7 (24 Nov 2021 06:08)  T(F): 97.9 (24 Nov 2021 14:59), Max: 98 (24 Nov 2021 06:08)  HR: 98 (24 Nov 2021 14:59) (88 - 100)  BP: 125/83 (24 Nov 2021 14:59) (110/67 - 131/74)  BP(mean): --  RR: 18 (24 Nov 2021 14:59) (13 - 18)  SpO2: 91% (24 Nov 2021 14:59) (91% - 100%)    Physical exam: The left knee wrapped with ace, urine on ace. Removed. Dressing is c/d/i. New ace placed. No drainage or discharge.  The calf is supple nontender.   Passive range of motion is acceptable to due postoperative pain.   No calf tenderness. Sensation to light touch is grossly intact distally.   Motor function distally is 5/5. Extensor hallucis longus and flexor hallucis longus are intact.   No foot drop. 2+ dorsalis pedis pulse. Capillary refill is less than 2 seconds. No cyanosis.    Primary Orthopedic Assessment:  • s/p LEFT total knee replacement    Plan:   • DVT prophylaxis as prescribed, including use of compression devices and ankle pumps  • Continue physical therapy  • Weightbearing as tolerated of the left lower extremity with assistance of a walker  • Incentive spirometry encouraged  • Pain control as clinically indicated  • Ancef per scip
Patient seen and examined . S/p L TKA  , POD # 1. Pain well controlled , no n/v , voiding without difficulty ,   participating with physical therapy     CC : L knee chronic pain post op well controlled           MEDICATIONS  (STANDING):  acetaminophen     Tablet .. 975 milliGRAM(s) Oral every 8 hours  aspirin 325 milliGRAM(s) Oral two times a day  budesonide 160 MICROgram(s)/formoterol 4.5 MICROgram(s) Inhaler 2 Puff(s) Inhalation two times a day  levothyroxine 50 MICROGram(s) Oral daily  montelukast 10 milliGRAM(s) Oral daily  oxybutynin 5 milliGRAM(s) Oral two times a day  pantoprazole    Tablet 40 milliGRAM(s) Oral before breakfast  senna 2 Tablet(s) Oral at bedtime  sodium chloride 0.9%. 1000 milliLiter(s) (100 mL/Hr) IV Continuous <Continuous>  vancomycin  IVPB 1500 milliGRAM(s) IV Intermittent once    MEDICATIONS  (PRN):  ALBUTerol    90 MICROgram(s) HFA Inhaler 1 Puff(s) Inhalation two times a day PRN Wheezing  aluminum hydroxide/magnesium hydroxide/simethicone Suspension 30 milliLiter(s) Oral four times a day PRN Indigestion  diphenhydrAMINE 50 milliGRAM(s) Oral every 6 hours PRN Rash and/or Itching  HYDROmorphone   Tablet 4 milliGRAM(s) Oral every 3 hours PRN Severe Pain (7 - 10)  HYDROmorphone  Injectable 0.5 milliGRAM(s) IV Push every 3 hours PRN breakthrough pain  magnesium hydroxide Suspension 30 milliLiter(s) Oral daily PRN Constipation  ondansetron Injectable 4 milliGRAM(s) IV Push every 6 hours PRN Nausea and/or Vomiting  oxyCODONE    IR 5 milliGRAM(s) Oral every 3 hours PRN Mild Pain (1 - 3)  oxyCODONE    IR 10 milliGRAM(s) Oral every 3 hours PRN Moderate Pain (4 - 6)      LABS:                          11.2   16.12 )-----------( 344      ( 25 Nov 2021 07:54 )             36.2     11-25    137  |  103  |  16.0  ----------------------------<  94  4.4   |  21.0<L>  |  0.68    Ca    9.7      25 Nov 2021 07:54      RADIOLOGY & ADDITIONAL TESTS:  < from: Xray Knee 1 or 2 Views, Left (11.24.21 @ 09:44) >     EXAM:  XR KNEE 1-2 VIEWS LT                          PROCEDURE DATE:  11/24/2021          INTERPRETATION:  Clinical history: 66-year-old female, TKA.    Two views of the left knee without comparison demonstrate that the patient is post total arthroplasty, prosthesis is in satisfactory position.    IMPRESSION:    Unremarkable postoperative views      SUSAN GUARDADO DO; Attending Radiologist  This document has been electronically signed. Nov 24 2021  5:01PM    < end of copied text >        REVIEW OF SYSTEMS:    As above , all other systems are reviewed and are negative .     Vital Signs Last 24 Hrs  T(C): 36.6 (25 Nov 2021 08:54), Max: 36.6 (24 Nov 2021 14:59)  T(F): 97.8 (25 Nov 2021 08:54), Max: 97.9 (24 Nov 2021 14:59)  HR: 88 (25 Nov 2021 08:54) (88 - 101)  BP: 124/77 (25 Nov 2021 08:54) (118/73 - 131/74)  BP(mean): --  RR: 18 (25 Nov 2021 08:54) (14 - 18)  SpO2: 96% (25 Nov 2021 08:54) (91% - 96%)  PHYSICAL EXAM:    GENERAL: NAD, well-groomed, well-developed  HEAD:  Atraumatic, Normocephalic  EYES: EOMI, PERRLA, conjunctiva and sclera clear  NECK: Supple, No JVD, Normal thyroid  NERVOUS SYSTEM:  Alert & Oriented X3, no focal deficit  CHEST/LUNG: CTA b/l ,  no  rales, rhonchi, wheezing, or rubs  HEART: Regular rate and rhythm; No murmurs, rubs, or gallops  ABDOMEN: Soft, Nontender, Nondistended; Bowel sounds present  EXTREMITIES:  2+ Peripheral Pulses, No clubbing, cyanosis, or edema,   L knee dressing + , clean and dry   LYMPH: No lymphadenopathy noted  SKIN: No rashes or lesions

## 2021-11-27 ENCOUNTER — NON-APPOINTMENT (OUTPATIENT)
Age: 66
End: 2021-11-27

## 2021-12-02 ENCOUNTER — LABORATORY RESULT (OUTPATIENT)
Age: 66
End: 2021-12-02

## 2021-12-04 PROBLEM — D35.2 BENIGN NEOPLASM OF PITUITARY GLAND: Chronic | Status: ACTIVE | Noted: 2021-11-18

## 2021-12-04 PROBLEM — M17.12 UNILATERAL PRIMARY OSTEOARTHRITIS, LEFT KNEE: Chronic | Status: ACTIVE | Noted: 2021-11-18

## 2021-12-09 ENCOUNTER — APPOINTMENT (OUTPATIENT)
Dept: RHEUMATOLOGY | Facility: CLINIC | Age: 66
End: 2021-12-09
Payer: COMMERCIAL

## 2021-12-09 VITALS
DIASTOLIC BLOOD PRESSURE: 72 MMHG | BODY MASS INDEX: 33.09 KG/M2 | OXYGEN SATURATION: 98 % | WEIGHT: 205 LBS | SYSTOLIC BLOOD PRESSURE: 118 MMHG | TEMPERATURE: 98 F | HEART RATE: 110 BPM

## 2021-12-09 DIAGNOSIS — E55.9 VITAMIN D DEFICIENCY, UNSPECIFIED: ICD-10-CM

## 2021-12-09 LAB
25(OH)D3 SERPL-MCNC: 24.4 NG/ML
ALBUMIN SERPL ELPH-MCNC: 3.8 G/DL
ALP BLD-CCNC: 88 U/L
ALT SERPL-CCNC: 8 U/L
ANA PAT FLD IF-IMP: ABNORMAL
ANA PATTERN: ABNORMAL
ANA SER IF-ACNC: ABNORMAL
ANA TITER: ABNORMAL
ANION GAP SERPL CALC-SCNC: 12 MMOL/L
AST SERPL-CCNC: 11 U/L
BASOPHILS # BLD AUTO: 0.08 K/UL
BASOPHILS NFR BLD AUTO: 0.6 %
BILIRUB SERPL-MCNC: 0.4 MG/DL
BUN SERPL-MCNC: 13 MG/DL
C3 SERPL-MCNC: 187 MG/DL
C4 SERPL-MCNC: 30 MG/DL
CALCIUM SERPL-MCNC: 9.8 MG/DL
CHLORIDE SERPL-SCNC: 101 MMOL/L
CO2 SERPL-SCNC: 26 MMOL/L
CREAT SERPL-MCNC: 0.76 MG/DL
CREAT SPEC-SCNC: 145 MG/DL
CREAT/PROT UR: 0.1 RATIO
CRP SERPL-MCNC: 51 MG/L
DSDNA AB SER-ACNC: <12 IU/ML
EOSINOPHIL # BLD AUTO: 0.61 K/UL
EOSINOPHIL NFR BLD AUTO: 4.5 %
ERYTHROCYTE [SEDIMENTATION RATE] IN BLOOD BY WESTERGREN METHOD: 80 MM/HR
GLUCOSE SERPL-MCNC: 114 MG/DL
HCT VFR BLD CALC: 40.5 %
HGB BLD-MCNC: 11.9 G/DL
IMM GRANULOCYTES NFR BLD AUTO: 0.3 %
LYMPHOCYTES # BLD AUTO: 3.83 K/UL
LYMPHOCYTES NFR BLD AUTO: 28.4 %
MAN DIFF?: NORMAL
MCHC RBC-ENTMCNC: 28.8 PG
MCHC RBC-ENTMCNC: 29.4 GM/DL
MCV RBC AUTO: 98.1 FL
MONOCYTES # BLD AUTO: 0.91 K/UL
MONOCYTES NFR BLD AUTO: 6.8 %
NEUTROPHILS # BLD AUTO: 8.01 K/UL
NEUTROPHILS NFR BLD AUTO: 59.4 %
PLATELET # BLD AUTO: 516 K/UL
POTASSIUM SERPL-SCNC: 4.1 MMOL/L
PROT SERPL-MCNC: 6.6 G/DL
PROT UR-MCNC: 14 MG/DL
RBC # BLD: 4.13 M/UL
RBC # FLD: 13 %
SODIUM SERPL-SCNC: 139 MMOL/L
THYROGLOB AB SERPL-ACNC: <20 IU/ML
THYROPEROXIDASE AB SERPL IA-ACNC: 29 IU/ML
TSH SERPL-ACNC: 0.04 UIU/ML
WBC # FLD AUTO: 13.48 K/UL

## 2021-12-09 PROCEDURE — 99214 OFFICE O/P EST MOD 30 MIN: CPT

## 2021-12-09 RX ORDER — OMEPRAZOLE MAGNESIUM 20 MG/1
20 CAPSULE, DELAYED RELEASE ORAL
Refills: 0 | Status: DISCONTINUED | COMMUNITY
End: 2021-12-09

## 2021-12-09 RX ORDER — PREDNISONE 5 MG/1
5 TABLET ORAL
Qty: 35 | Refills: 0 | Status: DISCONTINUED | COMMUNITY
Start: 2021-06-18 | End: 2021-12-09

## 2021-12-09 RX ORDER — ALBUTEROL SULFATE 90 UG/1
108 (90 BASE) INHALANT RESPIRATORY (INHALATION)
Qty: 8 | Refills: 0 | Status: DISCONTINUED | COMMUNITY
Start: 2020-10-15 | End: 2021-12-09

## 2021-12-09 RX ORDER — DUPILUMAB 200 MG/1.14ML
200 INJECTION, SOLUTION SUBCUTANEOUS
Qty: 2 | Refills: 0 | Status: DISCONTINUED | COMMUNITY
Start: 2020-07-30 | End: 2021-12-09

## 2021-12-09 RX ORDER — TROSPIUM CHLORIDE 20 MG/1
TABLET, FILM COATED ORAL
Refills: 0 | Status: DISCONTINUED | COMMUNITY
End: 2021-12-09

## 2021-12-09 RX ORDER — DICLOFENAC SODIUM 75 MG/1
75 TABLET, DELAYED RELEASE ORAL
Refills: 0 | Status: DISCONTINUED | COMMUNITY
End: 2021-12-09

## 2021-12-09 RX ORDER — DICLOFENAC SODIUM 75 MG/1
75 TABLET, DELAYED RELEASE ORAL
Qty: 1 | Refills: 2 | Status: DISCONTINUED | COMMUNITY
Start: 2021-08-04 | End: 2021-12-09

## 2021-12-09 RX ORDER — METHYLPREDNISOLONE 4 MG/1
4 TABLET ORAL
Qty: 1 | Refills: 0 | Status: DISCONTINUED | COMMUNITY
Start: 2021-04-08 | End: 2021-12-09

## 2021-12-09 RX ORDER — FLUTICASONE PROPIONATE AND SALMETEROL 50; 500 UG/1; UG/1
500-50 POWDER RESPIRATORY (INHALATION)
Qty: 180 | Refills: 0 | Status: DISCONTINUED | COMMUNITY
Start: 2020-08-17 | End: 2021-12-09

## 2021-12-09 RX ORDER — OMEPRAZOLE 20 MG/1
20 CAPSULE, DELAYED RELEASE ORAL
Refills: 0 | Status: DISCONTINUED | COMMUNITY
End: 2021-12-09

## 2021-12-09 RX ORDER — PREDNISONE 20 MG/1
20 TABLET ORAL
Qty: 12 | Refills: 0 | Status: DISCONTINUED | COMMUNITY
Start: 2020-07-30 | End: 2021-12-09

## 2021-12-09 RX ORDER — METHYLPREDNISOLONE 4 MG/1
4 TABLET ORAL
Qty: 1 | Refills: 0 | Status: DISCONTINUED | COMMUNITY
Start: 2020-11-09 | End: 2021-12-09

## 2021-12-09 NOTE — PHYSICAL EXAM
[Sclera] : the sclera and conjunctiva were normal [Extraocular Movements] : extraocular movements were intact [Outer Ear] : the ears and nose were normal in appearance [Neck Appearance] : the appearance of the neck was normal [Respiration, Rhythm And Depth] : normal respiratory rhythm and effort [Heart Rate And Rhythm] : heart rate was normal and rhythm regular [Heart Sounds] : normal S1 and S2 [Abdomen Soft] : soft [Abdomen Tenderness] : non-tender [Cervical Lymph Nodes Enlarged Anterior Bilaterally] : anterior cervical [Supraclavicular Lymph Nodes Enlarged Bilaterally] : supraclavicular [No CVA Tenderness] : no ~M costovertebral angle tenderness [Motor Tone] : muscle strength and tone were normal [] : no rash [No Focal Deficits] : no focal deficits [Impaired Insight] : insight and judgment were intact [Mood] : the mood was normal [FreeTextEntry1] : no synovitis or effusion on exam noted today; good ROM in b/l shoulders

## 2021-12-09 NOTE — HISTORY OF PRESENT ILLNESS
[FreeTextEntry1] : 65 y/o F here for follow up w/ +ALE >1:1280 speckled w/ Allergist and referred here. \par Reports she had pituitary microadenoma removed 9/2021 and Lt knee replacement 11/24/21.\par States she had some swelling of her face and saw the Allergist and told all testing normal except her TSH was low w/ Nl free T4 and high +ALE. States she gets itching on arms w/ the cold and gets scratch marks from itching at times and discussing w/ her Derm.\par She states she was noting her ears turn bright red in the cold. States she notes her fingertips turn red/white in the cold/ rayanud's. \par Denies any swelling or redness or warmth of any joints.\par Denies any fevers.\par Denies any malar rash or photosensitivity.\par States she has hx of painful oral ulcers/ ?aphthous once in a while and not lately. She is [id number]+.\par Denies any nasal or genital ulcers. \par Denies any dry eyes.\par Notes dry mouth at times.\par Denies any infectious diarrhea or  symptoms at this time.\par Denies any hx of blood clots, no stroke; 1 healthy pregnancy without complications; no miscarriages. \par She had Dexa 5/2020 and will monitor q 2 yrs. \par \par \par \par

## 2021-12-09 NOTE — ASSESSMENT
[FreeTextEntry1] : 66-year-old female, here for follow up w/ OA knee w/ +ALE >1:1280 speckled w/ Allergist with mainly reports of some raynaud's symptoms, dry mouth at times without much other symptoms on CTD/SLE at this time. \par Reports she had pituitary microadenoma removed 9/2021 and Lt knee replacement 11/24/21.\par -reviewed labs 12/2/20201 w/ high ESR=80; high CRP=51; high WBC=13.48 (from 11.33); high uxzv=034 (from 413); +ALE 1:320 homo/speckled' DS-DNA neg.; C3 high 187; Nl C4; urine prot/creat ratio= 0.1; CMP ok; low vit D\par -labs from [facility] 5/1/2020 w/ RF normal; +ALE >1:1280 speckled\par -No synovitis or effusion on exam noted today and advised to monitor.\par \par +ALE >1:1280: repeat ALE 1:320 homo/speckled; mainly reports of some raynaud's symptoms, dry mouth at times\par -Raynaud's: no ulcers and reports of color changes to red and purple in the hands & ears w/ the cold. Discussed to keep warm and if not controlled can add Ca channel blocker if BP allows\par -Clinically without much other symptoms of CTD/lupus at this time and educated on symptoms to monitor for in detail if she evolves.\par -reviewed labs w/ disease activity markers 12/2/20201 stable w/ DS-DNA negative; high C3 (not low); C4 WNL; urine prot/creat ratio=0.1\par -lab as below w/ disease activity markers as below to be complete for monitoring on f/u \par \par High WBC/ERR/CRP/ platelets: she will see her Ortho soon to confirm no infection of Lt knee w/ replacement 11/24/21 w/ decent ROM without pus noted today\par -referred back to her heme/onc for evaluation for unclear etiology \par -no synovitis; no PMR or GCA symptoms at this time\par -will monitor on repeat\par \par hx of oral ulcers: not lately and once in a while & tend to be painful/ likely aphthous ulcers/ +[id number]-[id number]\par -SLE ulcers tend to be painless \par -no genital or nasal ulcers at all\par \par LBP: intermittent; not much pain today \par -xray LS spine 5/19/2020 read as mild degenerative spondylosis; SI intact \par -Advil PRN w/ food needed sparingly helps\par \par Dexa: 5/19/2020 read as normal reviewed \par -Dexa referral given to do after 5/19/22 for f/u to monitor \par \par Hypovitaminosis D: low vitD w/ prescription for vitD repletion sent as discussed.\par \par HTN: QM=800/72, asymptomatic on lisinopril-HCTZ and monitors w/ PCP \par \par -educated on symptoms to monitor for in detail and alert us if any concerns.\par -knows to stay up to date on health maintenance w/ PCP\par -f/u in 6 mo w/ labs or sooner as needed \par

## 2021-12-09 NOTE — REASON FOR VISIT
[Follow-Up: _____] : a [unfilled] follow-up visit [FreeTextEntry1] : +ALE; review labs; bone health \par  \par

## 2021-12-15 ENCOUNTER — APPOINTMENT (OUTPATIENT)
Dept: ORTHOPEDIC SURGERY | Facility: CLINIC | Age: 66
End: 2021-12-15
Payer: COMMERCIAL

## 2021-12-15 VITALS
WEIGHT: 205 LBS | BODY MASS INDEX: 32.95 KG/M2 | HEART RATE: 112 BPM | SYSTOLIC BLOOD PRESSURE: 112 MMHG | HEIGHT: 66 IN | DIASTOLIC BLOOD PRESSURE: 73 MMHG

## 2021-12-15 DIAGNOSIS — Z96.652 PRESENCE OF LEFT ARTIFICIAL KNEE JOINT: ICD-10-CM

## 2021-12-15 PROCEDURE — 73562 X-RAY EXAM OF KNEE 3: CPT | Mod: LT

## 2021-12-15 PROCEDURE — 99024 POSTOP FOLLOW-UP VISIT: CPT

## 2021-12-15 NOTE — HISTORY OF PRESENT ILLNESS
[Procedure: ___] : status post [unfilled] [Clean/Dry/Intact] : clean, dry and intact [Healed] : healed [Swelling] : swollen [Neuro Intact] : an unremarkable neurological exam [Vascular Intact] : ~T peripheral vascular exam normal [Negative Tasneem's] : maneuvers demonstrated a negative Tasneem's sign [Xray (Date:___)] : [unfilled] Xray -  [Doing Well] : is doing well [No Sign of Infection] : is showing no signs of infection [Adequate Pain Control] : has adequate pain control [2] : the patient reports pain that is 2/10 in severity [Chills] : no chills [Constipation] : no constipation [Diarrhea] : no diarrhea [Dysuria] : no dysuria [Fever] : no fever [Nausea] : no nausea [Vomiting] : no vomiting [Erythema] : not erythematous [Discharge] : absent of discharge [Dehiscence] : not dehisced [de-identified] : S/P Left total knee arthroplasty.\par 2.  Computerassisted tibial resection, OrthAlign procedure, DOS: 11/24/21. [de-identified] : Pt is 3 weeks post-op. She stopped taking oxycodone 4 days ago. She had blood work done with endocrine and her WBC was 13. She had temp of 100.5 for 3-4 days after the sx. She then had a had low grade temp of 99.7. She is using Tylenol. She is on aspirin for DVTP. She is walking with a cane.  [de-identified] : Left knee exam shows healing incision with no sign of infection. ROM 0-85 degrees [de-identified] : 3V xray of the left knee done in the office today and reviewed by Dr. Karlos De La Vega demonstrates s/p implants in good positioning with no evidence of wear, loosening, or subsidence.  [de-identified] : At the pt's request, we ordered CBC. Pt should continue taking aspirin BID for DVTP. She should continue to do low impact exercises. Pt understands the importance of prophylaxis for invasive dental procedures. F/u with us in 3 weeks.

## 2021-12-15 NOTE — END OF VISIT
[FreeTextEntry3] : I, Vishnu Diego, acted solely as a scribe for Dr. Karlos De La Vega on this date 12/15/2021.

## 2022-01-24 ENCOUNTER — APPOINTMENT (OUTPATIENT)
Dept: GYNECOLOGIC ONCOLOGY | Facility: CLINIC | Age: 67
End: 2022-01-24
Payer: MEDICARE

## 2022-01-24 VITALS
RESPIRATION RATE: 16 BRPM | HEART RATE: 104 BPM | HEIGHT: 66 IN | WEIGHT: 205 LBS | OXYGEN SATURATION: 95 % | DIASTOLIC BLOOD PRESSURE: 80 MMHG | SYSTOLIC BLOOD PRESSURE: 132 MMHG | BODY MASS INDEX: 32.95 KG/M2

## 2022-01-24 PROCEDURE — 99213 OFFICE O/P EST LOW 20 MIN: CPT

## 2022-01-24 RX ORDER — SUCRALFATE 1 G/1
1 TABLET ORAL
Refills: 0 | Status: DISCONTINUED | COMMUNITY
End: 2022-01-24

## 2022-01-24 RX ORDER — OXYCODONE 5 MG/1
5 TABLET ORAL EVERY 8 HOURS
Qty: 20 | Refills: 0 | Status: DISCONTINUED | COMMUNITY
Start: 2021-12-15 | End: 2022-01-24

## 2022-01-24 RX ORDER — MELOXICAM 15 MG/1
15 TABLET ORAL
Qty: 30 | Refills: 0 | Status: DISCONTINUED | COMMUNITY
Start: 2020-06-24 | End: 2022-01-24

## 2022-01-24 NOTE — PHYSICAL EXAM
[Chaperone Present] : A chaperone was present in the examining room during all aspects of the physical examination [Absent] : Adnexa(ae): Absent [Normal] : Bimanual Exam: Normal [FreeTextEntry1] : Yojana Mujica MA

## 2022-01-24 NOTE — REASON FOR VISIT
[FreeTextEntry1] : [facility]\par \par [facility] [phone] at [address] [place] [id number]\par

## 2022-01-24 NOTE — HISTORY OF PRESENT ILLNESS
[FreeTextEntry1] : This 68y/o with history of stage 1A grade 1 endometrial cancer (no myometrial invasion) s/p RA TLH BSO b/l pelvic and para-aortic sentinel lymph node dissection, CHIOMA pineda on 03/27/18 presents for surveillance exam today. She denies any pelvic/abdominal pain or VB. Reports having a colonoscopy in spring 2021-polyps removed. She gets frequent colonoscopies due to family history of colon cancer and personal history of polyps. DEXA on 5/20/20 was normal. She reports having a mammogram in the Spring 2021, ordered by Dr. Payton Danielson and is due in March 2022. \par \par Since her last visit, she had total knee replacement in Nov 2021. She also had pituitary gland surgery in the Fall. She is undergoing PT for her knee and is doing well overall. \par \par

## 2022-01-24 NOTE — ASSESSMENT
[FreeTextEntry1] : Pt is clinically JEROME on exam today. \par \par The signs and symptoms of recurrence were reviewed and the patient knows to return to see me sooner if she has any of these or any other concerns prior to her next visit.  Reasonable expectations for cure were also discussed.\par \par

## 2022-02-04 ENCOUNTER — APPOINTMENT (OUTPATIENT)
Dept: ORTHOPEDIC SURGERY | Facility: CLINIC | Age: 67
End: 2022-02-04
Payer: MEDICARE

## 2022-02-04 VITALS
WEIGHT: 205 LBS | HEART RATE: 88 BPM | DIASTOLIC BLOOD PRESSURE: 78 MMHG | BODY MASS INDEX: 32.95 KG/M2 | HEIGHT: 66 IN | SYSTOLIC BLOOD PRESSURE: 117 MMHG

## 2022-02-04 LAB
BASOPHILS # BLD AUTO: 0.09 K/UL
BASOPHILS NFR BLD AUTO: 0.8 %
EOSINOPHIL # BLD AUTO: 0.21 K/UL
EOSINOPHIL NFR BLD AUTO: 1.9 %
HCT VFR BLD CALC: 44.2 %
HGB BLD-MCNC: 13.1 G/DL
IMM GRANULOCYTES NFR BLD AUTO: 0.4 %
LYMPHOCYTES # BLD AUTO: 3.94 K/UL
LYMPHOCYTES NFR BLD AUTO: 34.9 %
MAN DIFF?: NORMAL
MCHC RBC-ENTMCNC: 28.4 PG
MCHC RBC-ENTMCNC: 29.6 GM/DL
MCV RBC AUTO: 95.7 FL
MONOCYTES # BLD AUTO: 0.72 K/UL
MONOCYTES NFR BLD AUTO: 6.4 %
NEUTROPHILS # BLD AUTO: 6.3 K/UL
NEUTROPHILS NFR BLD AUTO: 55.6 %
PLATELET # BLD AUTO: 321 K/UL
RBC # BLD: 4.62 M/UL
RBC # FLD: 15 %
WBC # FLD AUTO: 11.3 K/UL

## 2022-02-04 PROCEDURE — 99024 POSTOP FOLLOW-UP VISIT: CPT

## 2022-02-04 PROCEDURE — 73562 X-RAY EXAM OF KNEE 3: CPT | Mod: LT

## 2022-02-04 NOTE — HISTORY OF PRESENT ILLNESS
[___ Weeks Post Op] : [unfilled] weeks post op [0] : no pain reported [Xray (Date:___)] : [unfilled] Xray -  [Doing Well] : is doing well [Excellent Pain Control] : has excellent pain control [Chills] : no chills [Constipation] : no constipation [Dysuria] : no dysuria [Fever] : no fever [de-identified] : S/P Left total knee arthroplasty.\par 2. Computer_assisted tibial resection, OrthAlign procedure, DOS: 11/24/21. \par  [de-identified] : she is 10 weeks from left TKA \par she is in PT\par she denies pain\par she walks without using walking assistive devise \par her WBC is back to baseline 11 point \par  [de-identified] : Left knee exam shows healing incision with no sign of infection. ROM 0-100 degrees. The surgical incision site(s) swollen, but clean, dry and intact, healed, not erythematous, absent of discharge and not dehisced. Additional findings included an unremarkable neurological exam, peripheral vascular exam normal and maneuvers demonstrated a negative Tasneem's sign. \par  [de-identified] : 3V xray of the left knee done in the office today and demonstrates s/p implants in good positioning with no evidence of wear, loosening, or subsidence. \par  [de-identified] : 3V xray of the left knee done in the office today and reviewed by Dr. Karlos De La Vega demonstrates s/p implants in good positioning with no evidence of wear, loosening, or subsidence. \par

## 2022-02-07 ENCOUNTER — RX RENEWAL (OUTPATIENT)
Age: 67
End: 2022-02-07

## 2022-02-09 ENCOUNTER — TRANSCRIPTION ENCOUNTER (OUTPATIENT)
Age: 67
End: 2022-02-09

## 2022-02-13 ENCOUNTER — TRANSCRIPTION ENCOUNTER (OUTPATIENT)
Age: 67
End: 2022-02-13

## 2022-02-16 ENCOUNTER — APPOINTMENT (OUTPATIENT)
Dept: ORTHOPEDIC SURGERY | Facility: CLINIC | Age: 67
End: 2022-02-16
Payer: MEDICARE

## 2022-02-16 VITALS
SYSTOLIC BLOOD PRESSURE: 141 MMHG | HEART RATE: 110 BPM | BODY MASS INDEX: 32.95 KG/M2 | DIASTOLIC BLOOD PRESSURE: 86 MMHG | WEIGHT: 205 LBS | HEIGHT: 66 IN

## 2022-02-16 PROCEDURE — 99024 POSTOP FOLLOW-UP VISIT: CPT

## 2022-02-16 PROCEDURE — 73562 X-RAY EXAM OF KNEE 3: CPT | Mod: LT

## 2022-02-16 NOTE — END OF VISIT
[FreeTextEntry3] : I, Vishnu Diego, acted solely as a scribe for Dr. Karlos De La Vega on this date 02/16/2022.

## 2022-02-16 NOTE — HISTORY OF PRESENT ILLNESS
[Procedure: ___] : status post [unfilled] [0] : no pain reported [Clean/Dry/Intact] : clean, dry and intact [Healed] : healed [Swelling] : swollen [Neuro Intact] : an unremarkable neurological exam [Vascular Intact] : ~T peripheral vascular exam normal [Negative Tasneem's] : maneuvers demonstrated a negative Tasneem's sign [Xray (Date:___)] : [unfilled] Xray -  [Doing Well] : is doing well [Excellent Pain Control] : has excellent pain control [No Sign of Infection] : is showing no signs of infection [Chills] : no chills [Constipation] : no constipation [Diarrhea] : no diarrhea [Dysuria] : no dysuria [Fever] : no fever [Nausea] : no nausea [Vomiting] : no vomiting [Erythema] : not erythematous [Discharge] : absent of discharge [Dehiscence] : not dehisced [de-identified] : S/P Left total knee arthroplasty.\par 2. Computer_assisted tibial resection, OrthAlign procedure, DOS: 11/24/21.  [de-identified] : Pt is 12 weeks post-op. She has no pain. She is ambulating without any assistive devices. She is in PT. She is happy with the surgical outcome. She states that she only has some achiness when it's cold outside.  [de-identified] : Left knee exam shows healed incision with no sign of infection. ROM 0-120 degrees [de-identified] : 3V xray of the left knee done in the office today and reviewed by Dr. Karlos De La Vega demonstrates s/p implants in good positioning with no evidence of wear, loosening, or subsidence.  [de-identified] : She should continue to do low impact exercises. Pt understands the importance of prophylaxis for invasive dental procedures. F/u with us a year from surgery.

## 2022-02-21 ENCOUNTER — TRANSCRIPTION ENCOUNTER (OUTPATIENT)
Age: 67
End: 2022-02-21

## 2022-02-28 ENCOUNTER — LABORATORY RESULT (OUTPATIENT)
Age: 67
End: 2022-02-28

## 2022-03-03 ENCOUNTER — APPOINTMENT (OUTPATIENT)
Dept: RHEUMATOLOGY | Facility: CLINIC | Age: 67
End: 2022-03-03
Payer: MEDICARE

## 2022-03-03 DIAGNOSIS — R70.0 ELEVATED ERYTHROCYTE SEDIMENTATION RATE: ICD-10-CM

## 2022-03-03 DIAGNOSIS — E83.52 HYPERCALCEMIA: ICD-10-CM

## 2022-03-03 DIAGNOSIS — R79.82 ELEVATED C-REACTIVE PROTEIN (CRP): ICD-10-CM

## 2022-03-03 DIAGNOSIS — R76.8 OTHER SPECIFIED ABNORMAL IMMUNOLOGICAL FINDINGS IN SERUM: ICD-10-CM

## 2022-03-03 DIAGNOSIS — Z78.0 ASYMPTOMATIC MENOPAUSAL STATE: ICD-10-CM

## 2022-03-03 DIAGNOSIS — R68.2 DRY MOUTH, UNSPECIFIED: ICD-10-CM

## 2022-03-03 DIAGNOSIS — D72.829 ELEVATED WHITE BLOOD CELL COUNT, UNSPECIFIED: ICD-10-CM

## 2022-03-03 DIAGNOSIS — I73.00 RAYNAUD'S SYNDROME W/OUT GANGRENE: ICD-10-CM

## 2022-03-03 LAB
CALCIUM SERPL-MCNC: 10.7 MG/DL
PARATHYROID HORMONE INTACT: 26 PG/ML

## 2022-03-03 PROCEDURE — 99214 OFFICE O/P EST MOD 30 MIN: CPT | Mod: 95

## 2022-03-03 NOTE — PHYSICAL EXAM
[General Appearance - Alert] : alert [General Appearance - In No Acute Distress] : in no acute distress [Sclera] : the sclera and conjunctiva were normal [Extraocular Movements] : extraocular movements were intact [] : no rash [Impaired Insight] : insight and judgment were intact [Mood] : the mood was normal [FreeTextEntry1] : no synovitis or effusion on exam noted today; good ROM in b/l shoulders, no pelvic/girdle stiffness and able to stand up without using her hands

## 2022-03-03 NOTE — ASSESSMENT
[FreeTextEntry1] : 67-year-old female, for follow up w/ OA knee w/ +ALE >1:1280 speckled w/ Allergist with mainly reports of some raynaud's symptoms, dry mouth at times without much other symptoms on CTD/SLE at this time. \par Reports she had pituitary microadenoma removed 9/2021 and Lt knee replacement 11/24/21.\par -reviewed labs 2/28/22 w/ high decreasing ESR=53 (from 80); high decreasing CRP= 7 (from 51); high WBC=13.56 (from 11.30 to see f/u w/ her heme/onc, Dr. Palmer); DS-DNA neg.; C3 high =167; Nl C4; urine prot/creat ratio= 0.1; CMP w/ high Ca=10.7 w/ normal vit D\par -labs from [facility] 5/1/2020 w/ RF normal; +ALE >1:1280 speckled\par -No synovitis or effusion on exam noted today and advised to monitor.\par -dry mouth: discussed biotene mouthwash or toothpaste or spray PRN; check early Sjogren abs to be complete\par \par Hypercalcemia: high Ca=10.7 on CMP 2/2/822 informed\par -denies being on any Ca supplements and will avoid it \par -will check ionized Ca and parathyroid on labs ordered on it \par -reports seeing her PCP, Dr. Kaiser for her scheduled f/u visit tomorrow so will monitor there\par \par +ALE >1:1280: repeat ALE 1:320 homo/speckled; mainly reports of some raynaud's symptoms, dry mouth at times\par -Raynaud's: no ulcers and reports of color changes to red and purple in the hands & ears w/ the cold. Discussed to keep warm and if not controlled can add Ca channel blocker if BP allows\par -Clinically without much other symptoms of CTD/lupus at this time and educated on symptoms to monitor for in detail if she evolves.\par -reviewed labs w/ disease activity markers 2/28/22 stable w/ DS-DNA negative; high C3 (not low); C4 WNL; urine prot/creat ratio=0.1\par -lab as below w/ disease activity markers as below to be complete for monitoring on f/u \par \par High WBC/ERR/CRP: reports of night sweats \par -referred back to her heme/onc for evaluation for unclear etiology \par -no synovitis; no PMR or GCA symptoms at this time\par -will monitor on repeat\par \par hx of oral ulcers: not lately and once in a while & tend to be painful/ likely aphthous ulcers/ +HSV-1IgG\par -SLE ulcers tend to be painless \par -no genital or nasal ulcers at all\par \par LBP: intermittent; not much pain today \par -xray LS spine 5/19/2020 read as mild degenerative spondylosis; SI intact \par -Advil PRN w/ food needed sparingly helps\par \par Dexa: 5/19/2020 read as normal reviewed \par -Dexa referral given to do after 5/19/22 for f/u to monitor \par \par -educated on symptoms to monitor for in detail and alert us if any concerns.\par -knows to stay up to date on health maintenance w/ PCP\par -f/u in 3 mo w/ labs, Dexa or sooner as needed \par . \par

## 2022-03-03 NOTE — HISTORY OF PRESENT ILLNESS
[FreeTextEntry1] : Verbal consent given for telehealth video visit on 3/3/22 by patient, via [employer] with visit done from my [employer] laptop at [address] [place] [address] and patient at her home in [place] which got disconnected so then finished visit using [employer]ity.\par Verbal consent given for telehealth video visit on 3/22/22 by patient, via [employer] with visit done from my [employer] lphone at [address] [place] [address] and patient at her home on her phone in [place].\par \par 66 y/o F here for follow up w/ +ALE >1:1280 speckled w/ Allergist and referred here. \par Reports she had pituitary microadenoma removed 9/2021 and Lt knee replacement 11/24/21.\par States she had some swelling of her face and saw the Allergist and told all testing normal except her TSH was low w/ Nl free T4 and high +ALE. States she gets itching on arms w/ the cold and gets scratch marks from itching at times and discussing w/ her Derm.\par She states she was noting her ears turn bright red in the cold. States she notes her fingertips turn red/white in the cold/ rayanud's. \par Denies any swelling or redness or warmth of any joints.\par Denies any fevers.\par Denies any malar rash or photosensitivity.\par States she has hx of painful oral ulcers/ ?aphthous once in a while and not lately. She is [id number]+.\par Denies any nasal or genital ulcers. \par Denies any dry eyes.\par Notes dry mouth at times.\par Denies any infectious diarrhea or  symptoms at this time.\par Denies any hx of blood clots, no stroke; 1 healthy pregnancy without complications; no miscarriages. \par \par She had Dexa 5/2020 and will monitor q 2 yrs. \par \par She has hx of high ESR/CRP decreasing now. \par Has full ROM in b/l shoulders, no pelvic/girdle stiffness and able to stand up without using her hands, no temporal pain/unremitting headaches, no vision changes, no jaw pain.\par Denies any fevers and no infection. States night sweats at times and will f/u w/ her Heme/Onc, Dr. Palmer w/ high WBC of unclear etiology also.\par Noted to have high Ca on labs and knows to avoid any Ca supplements and will recheck w/ her PCP, Dr. Kaiser on her scheduled f/u visit there tomorrow to monitor. \par \par \par \par

## 2022-03-07 LAB — CA-I SERPL-SCNC: 1.06 MMOL/L

## 2022-03-10 ENCOUNTER — RESULT REVIEW (OUTPATIENT)
Age: 67
End: 2022-03-10

## 2022-03-28 NOTE — H&P PST ADULT - MALLAMPATI CLASS
ONCOLOGY/HEMATOLOGY DAILY PROGRESS NOTE    Patient: Galina Watts Date: 3/28/2022   : 1961 Attending: Nathan Carbajal MD   60 year old female      ADMISSION DATE:  3/22/2022   CURRENT HOSPITAL DAY:  Hospital Day: 7  DATE OF ENCOUNTER:  3/28/2022  CODE STATUS:  Full Resuscitation  Problem List:   Patient Active Problem List   Diagnosis   • Carpal tunnel syndrome on both sides   • Right lower lobe pulmonary nodule- fu in 6 mo CT scan chest for stability 2022   • S/P mediastinoscopy lymph node biopsy   • MGUS (monoclonal gammopathy of unknown significance)   • Fever   • Nausea and vomiting   • Diarrhea   • Hypotension due to hypovolemia   • Severe sepsis (CMS/HCC)   • NSTEMI (non-ST elevated myocardial infarction) (CMS/HCC)   • Coronary artery calcification     Chief Complaint/Reason for consult: Lymphadenopathy, abnormal CBC in setting of elevated CPK, fevers, lymphadenopathy, rash, worsening LFTs    Subjective/HPI: Ms. Galina Watts is a 60 year old female seen in consultation for the above.  Pt feels well. No new chest pain, Fevers resolved. No cough. On systemic steroids.   No hematochezia, melena, hematuria, epistaxis, hematemesis, hemoptysis, or gingival bleeding. No dizziness, lightheadedness, palpitations, focal weakness, vision change, or confusion.     Review of systems: 12-point review of systems was performed and found to be negative except as noted above in HPI.    Medications :  Scheduled  • ivermectin  200 mcg/kg Oral Daily   • potassium CHLORIDE  40 mEq Oral Once   • predniSONE  60 mg Oral Daily with breakfast   • cefepime (MAXIPIME) IVPB  1,000 mg Intravenous 3 times per day   • sodium chloride (PF)  10 mL Injection Once   • calcium carbonate  1,250 mg Oral BID WC   • famotidine  20 mg Oral 2 times per day   • sodium chloride (PF)  2 mL Intracatheter 2 times per day   • Potassium Standard Replacement Protocol   Does not apply See Admin Instructions   • Magnesium Standard Replacement Protocol   Does not  apply See Admin Instructions   • Phosphorus Standard Replacement Protocol   Does not apply See Admin Instructions       PRN  • sodium chloride 0.9 % flush bag 25 mL  25 mL Intravenous PRN For total dose see MAR   • sodium chloride 0.9 % flush bag 25 mL  25 mL Intravenous PRN For total dose see MAR   • sodium chloride (NORMAL SALINE) 0.9 % bolus 500 mL  500 mL Intravenous PRN For total dose see MAR   • ondansetron (ZOFRAN) injection 4 mg  4 mg Intravenous BID PRN For total dose see MAR   • acetaminophen (TYLENOL) tablet 650 mg  650 mg Oral Q4H PRN For total dose see MAR   • albuterol inhaler 2 puff  2 puff Inhalation Q4H Resp PRN For total dose see MAR       Continuous infusion      Allergies:   ALLERGIES:   Allergen Reactions   • Amoxicillin-Pot Clavulanate PRURITUS   • Vancomycin RASH     NOT AN ALLERGY, red man syndrome, prolong infusions if ordered & pre-medication with benadryl x1.          Vitals :     Vitals Last Value 24-Hour Range   Temperature 97.4 °F (36.3 °C) (03/28/22 1318) Temp  Min: 97.4 °F (36.3 °C)  Max: 98 °F (36.7 °C)   Pulse 96 (03/28/22 1636) Pulse  Min: 75  Max: 102   Respiratory (!) 24 (03/28/22 1318) Resp  Min: 19  Max: 38   Non-Invasive  Blood Pressure 127/80 (03/28/22 1318) BP  Min: 113/78  Max: 132/88   Pulse Oximetry 96 % (03/28/22 1318) SpO2  Min: 94 %  Max: 96 %     Vitals Today Admitted   Weight 59.5 kg (131 lb 2.8 oz) (03/28/22 1318) Weight: 58.1 kg (128 lb) (03/22/22 0957)   Height N/A Height: 5' 4\" (162.6 cm) (03/22/22 0957)   Body Mass Index N/A BMI (Calculated): 21.97 (03/22/22 0957)     Physical Examination :  GENERAL: Awake conversant in no distress  HEENT: Anicteric sclera. No oral lesions. MM dry  NECK: Supple with no cervica or supraclavicular adenopathy. No palpable masses.  LUNGS: Clear to auscultation bilaterally and no dullness on percussion.  CARDIOVASCULAR: Regular rate and rhythm. No S3, S4 or any murmurs.  ABDOMEN: Soft, nontender, no hepatosplenomegaly. Bowel sounds  are normal. No abnormal masses are palpable.  EXTREMITIES: No edema of lower extremities/upper extremities bilaterally. No clubbing/cyanosis  NEUROLOGIC: No focal deficits appreciated.  LYMPHATIC SYSTEM: No cervical, supraclavicular or axillary lymphadenopathy  SKIN: No rashes lesions seen.      Laboratory Results:  Recent Labs   Lab 03/28/22  1655 03/28/22  1516 03/28/22  0324 03/27/22  1416 03/27/22  0239 03/26/22  1535 03/26/22  0752 03/26/22  0348 03/23/22  0346 03/22/22  1021 03/22/22  1000 03/22/22  0959   WBC 16.2*  --  13.6*  --  11.9*  --  7.6 7.3   < >  --   --  17.8*   HCT 30.1*  --  27.6*  --  27.7*   < > 27.4* 28.9*   < >  --   --  35.3*   HGB 10.3*  --  9.5*  --  9.7*   < > 9.5* 9.9*   < >  --   --  11.7*   PLT 81*  --  70*  --  75*  --  66* 66*   < >  --   --  111*   INR  --   --   --   --   --   --  1.2  --   --   --   --  1.1   PTT  --   --   --   --   --   --  38*  --   --   --   --  33*   SODIUM  --   --  138  --  138  --   --  139   < >  --    < >  --    POTASSIUM  --  3.1* 3.3* 3.2* 3.0*   < >  --  3.1*   < >  --    < >  --    CHLORIDE  --   --  104  --  106  --   --  112*   < >  --    < >  --    CO2  --   --  28  --  24  --   --  15*   < >  --    < >  --    CALCIUM  --   --  8.0*  --  7.9*  --   --  7.7*   < >  --    < >  --    GLUCOSE  --   --  160*  --  148*  --   --  124*   < >  --    < >  --    BUN  --   --  10  --  14  --   --  19   < >  --    < >  --    CREATININE  --   --  0.52  --  0.57  --   --  0.70   < >  --    < >  --    AST  --   --  219*  --  322*  --   --  354*   < >  --    < >  --    GPT  --   --  91*  --  85*  --   --  66*   < >  --    < >  --    ALKPT  --   --  61  --  60  --   --  49   < >  --    < >  --    BILIRUBIN  --   --  0.7  --  0.5  --   --  0.4   < >  --    < >  --    ALBUMIN  --   --  3.0*  --  3.0*  --   --  3.3*   < >  --    < >  --    LIPA  --   --   --   --   --   --   --   --   --  145  --   --     < > = values in this interval not displayed.        Imaging:  IMPRESSION:     1.  No evidence of pulmonary embolism to the distal segmental level.    2.  Small bilateral pleural effusions, right greater than left.  3.  Redemonstrated mediastinal, bilateral hilar and bilateral axillary  lymphadenopathy, similar to prior. This is of uncertain etiology.    Assessment and Plan:  1. Lymphadenopathy associated with high grade fevers,   Abnormal CBC in setting of elevated CPK, fevers, lymphadenopathy, rash, worsening LFTs  Fever of unknown origin?  Elevated IgE noted, Elevated ESR, resolved,  Lymphadenopathy w/u from prior mediastinoscopy 12/2021 which was negative for malignancy.  Bone marrow biopsy negative  CBC this admission shows atypical lymphocytes    2. Maculopapular rash  3. MGUS; Faint IgA lambda monoclonal protein 0.4mg  4. DIC? ADAMTS pending  5. Nephrotic range proteinuria; noted plans for possible kidney biopsy  6. Diarrhea    I have discussed my findings and further recommendations with the patient and answered all questions to their satifaction and she has verbalized understanding and is in agreement. Thank you very much for giving me the opportunity to evaluate and be involved in the care of Galina Watts. Please dont hesitate to contact me with any further questions.    Catracho Campos MD  Pager: [phone]                                       Class II - visualization of the soft palate, fauces, and uvula

## 2022-04-04 NOTE — OCCUPATIONAL THERAPY INITIAL EVALUATION ADULT - PATIENT/FAMILY/SIGNIFICANT OTHER GOALS STATEMENT, OT EVAL
Khloe called me back straight away.    She is agreeable to starting. Her questions were answered and I transferred her to our scheduling department to assist in scheduling.     Sofi Sandoval  RN Care Coordinator  [facility]     go home and get better How Severe Are Your Spot(S)?: mild What Is The Reason For Today's Visit?: Full Body Skin Examination What Is The Reason For Today's Visit? (Being Monitored For X): concerning skin lesions on an annual basis

## 2022-05-05 ENCOUNTER — APPOINTMENT (OUTPATIENT)
Dept: ORTHOPEDIC SURGERY | Facility: CLINIC | Age: 67
End: 2022-05-05
Payer: MEDICARE

## 2022-05-05 DIAGNOSIS — M19.011 PRIMARY OSTEOARTHRITIS, RIGHT SHOULDER: ICD-10-CM

## 2022-05-05 PROCEDURE — 99213 OFFICE O/P EST LOW 20 MIN: CPT

## 2022-05-05 NOTE — ADDENDUM
[FreeTextEntry1] : Documented by Cesar Freire acting as a scribe for Dr. Sullivan and Ross Ríos PA-C on 05/05/2022. \par \par All medical record entries made by the Scribe were at my, Ross Ríos's, direction and\par personally dictated by me on 05/05/2022. I have reviewed the chart and agree that the record\par accurately reflects my personal performance of the history, physical exam, procedure and imaging.

## 2022-05-05 NOTE — PHYSICAL EXAM
[de-identified] : Physical Exam:\par General: Well appearing, no acute distress, A&Ox3\par Neurologic: No focal deficits\par Head: NCAT without abrasions, lacerations, or ecchymosis to head, face, or scalp\par Eyes: No scleral icterus, no gross abnormalities\par Respiratory: Equal chest wall expansion bilaterally, no accessory muscle use\par Lymphatic: No lymphadenopathy palpated\par Skin: Warm and dry\par Psychiatric: Normal mood and affect\par \par C-Spine\par Palpation: Tenderness to paraspinal muscles and trapezius muscle\par ROM: side bending, symmetrical. Pain with extension and flexion of the neck.\par Reflexes: C5-7 [normal]\par \par Right Shoulder\par ·	Inspection/Palpation: No tenderness, no crepitus, no deformities\par ·	Range of Motion: no crepitus with ROM; Active ; ER at side 85; IR to L1\par ·	Strength: forward elevation in scapular plane 5/5, internal rotation 5/5, external rotation 5/5, adduction 5/5 and abduction 4/5 \par ·	Stability: no joint instability on provocative testing\par ·	Tests: Ordonez test negative Neer negative positive drop arm test secondary to pain, bear hug test positive, Napolean sign POS, cross arm adduction positive, lift off sign positive, hornblowers sign POS, speeds test negative, Yergason's test negative, Beth's Active Compression test negative, whipple test positive, bicep's load II test negative\par \par Left Shoulder\par ·	Inspection/Palpation: no tenderness, swelling or deformities\par ·	Range of Motion: full and painless in all planes, no crepitus\par ·	Strength: forward elevation in scapular plane 5/5, internal rotation 5/5, external rotation 5/5, adduction 5/5 and abduction 5/5\par ·	Stability: no joint instability on provocative testing\par Tests: Ordonez test negative, Neer sign negative, negative drop arm test secondary to pain, bear hug test negative, Napolean sign negative, cross arm adduction negative, lift off sign positive, hornblowers sign negative, speeds test negative, Yergason's test negative, no bicipital groove tenderness, Beth's Active Compression test negative\par \par Elbow Exam\par \par Skin: Clean, dry, intact. No ecchymosis. No swelling. No palpable joint effusion.\par ROM: RIGHT 0-140, full supination/pronation.  LEFT 0-140, full supination/pronation.\par Painful ROM: None\par Tenderness: [No] medial epicondyle pain. No lateral epicondyle pain. [No] olecranon pain. No pain at radial head.\par Strength: 5/5 elbow flexion, 5/5 elbow extension, 5/5 supination, 5/5 pronation\par Stability: Stable to varus/valgus stress\par Vasc: 2+ radial pulse, <2s cap refill\par Sensation: In tact to light touch throughout\par Neuro: Negative tinels at ulnar canal, AIN/PIN/Ulnar nerve in tact to motor/sensation.\par \par Special Tests:\par Dorsiflexion Against Resistance: Negative\par Flexion of Wrist Against Resistance: NEG\par Resistance Against Pronation: NEG\par Resistance Against Supination: Negative\par Tinel's Sign Cubital Tunnel: Negative

## 2022-05-05 NOTE — DISCUSSION/SUMMARY
[de-identified] : DELISA is a 66 year female who presents today for follow up for right shoulder pain secondary to adhesive capsulitis.  At appt on Aug 2021 she received a cortisone injection to R shoulder. Patient received two prior cortisone injection that provided her 3-4 months of relief. Currently, she reports slow improvement in R shoulder pain with HEP 2x/week, and NSAIDs. Currently, she reports reduction in ROM and strength. Patient had Left knee replacement in Nov 2021, and brain surgery for pituitary tumor removal in Feb 2021. She is taking Celebrex prescribed by Dr. De La Vega. \par \par We had a thorough discussion regarding the nature of her pain, the pathophysiology, as well as all treatment options. Non operative treatment range from PT, HEP, oral steroids, NSAIDs. Patient is on medicare, and has 8 PT visit, thus elects for HEP. A home exercise sheet and bands were provided and discussed with patient to follow. A prescription of Medrol Dose STEPHANE was given and patient was informed about its risks and benefits. Patient will follow up in 6-8 wks for repeat clinical assessment. If refractory then we will consider PT. All questions were answered and the patient verbalized understanding. The patient is in agreement with this treatment plan.

## 2022-05-05 NOTE — HISTORY OF PRESENT ILLNESS
[Stable] : stable [___ mths] : [unfilled] month(s) ago [4] : a current pain level of 4/10 [7] : an average pain level of 7/10 [1] : a minimum pain level of 1/10 [9] : a maximum pain level of 9/10 [Intermit.] : ~He/She~ states the symptoms seem to be intermittent [Lifting] : worsened by lifting [Physical Therapy] : relieved by physical therapy [Rest] : relieved by rest [de-identified] : DELISA is a 66 year female who presents today for follow up for right shoulder pain secondary to adhesive capsulitis.  At appt on Aug 2021 she received a cortisone injection to R shoulder. Patient received two prior cortisone injection that provided her 3-4 months of relief. Currently, she reports slow improvement in R shoulder pain with HEP 2x/week, and NSAIDs. Currently, she reports reduction in ROM and strength. Patient had Left knee replacement in Nov 2021, and brain surgery for pituitary tumor removal in Feb 2021. She is taking Celebrex prescribed by Dr. De La Vega. \par \par  [de-identified] : oral steroids

## 2022-05-12 ENCOUNTER — NON-APPOINTMENT (OUTPATIENT)
Age: 67
End: 2022-05-12

## 2022-05-13 ENCOUNTER — TRANSCRIPTION ENCOUNTER (OUTPATIENT)
Age: 67
End: 2022-05-13

## 2022-06-06 ENCOUNTER — APPOINTMENT (OUTPATIENT)
Dept: RHEUMATOLOGY | Facility: CLINIC | Age: 67
End: 2022-06-06

## 2022-08-01 ENCOUNTER — APPOINTMENT (OUTPATIENT)
Dept: ORTHOPEDIC SURGERY | Facility: CLINIC | Age: 67
End: 2022-08-01

## 2022-08-01 ENCOUNTER — APPOINTMENT (OUTPATIENT)
Dept: GYNECOLOGIC ONCOLOGY | Facility: CLINIC | Age: 67
End: 2022-08-01

## 2022-08-01 VITALS
HEIGHT: 66 IN | WEIGHT: 200 LBS | DIASTOLIC BLOOD PRESSURE: 86 MMHG | HEART RATE: 98 BPM | BODY MASS INDEX: 32.14 KG/M2 | OXYGEN SATURATION: 99 % | SYSTOLIC BLOOD PRESSURE: 124 MMHG

## 2022-08-01 VITALS — WEIGHT: 200 LBS | BODY MASS INDEX: 32.14 KG/M2 | HEIGHT: 66 IN

## 2022-08-01 DIAGNOSIS — N76.0 ACUTE VAGINITIS: ICD-10-CM

## 2022-08-01 DIAGNOSIS — N39.0 URINARY TRACT INFECTION, SITE NOT SPECIFIED: ICD-10-CM

## 2022-08-01 PROCEDURE — 99213 OFFICE O/P EST LOW 20 MIN: CPT | Mod: 25

## 2022-08-01 PROCEDURE — 81002 URINALYSIS NONAUTO W/O SCOPE: CPT | Mod: 59

## 2022-08-01 PROCEDURE — 20605 DRAIN/INJ JOINT/BURSA W/O US: CPT

## 2022-08-01 RX ORDER — AMOXICILLIN 500 MG/1
500 TABLET, FILM COATED ORAL
Qty: 4 | Refills: 2 | Status: DISCONTINUED | COMMUNITY
Start: 2022-04-04 | End: 2022-08-01

## 2022-08-01 RX ORDER — METHYLPREDNISOLONE 4 MG/1
4 TABLET ORAL
Qty: 1 | Refills: 0 | Status: DISCONTINUED | COMMUNITY
Start: 2022-05-05 | End: 2022-08-01

## 2022-08-01 RX ORDER — ERGOCALCIFEROL 1.25 MG/1
1.25 MG CAPSULE, LIQUID FILLED ORAL
Qty: 4 | Refills: 1 | Status: DISCONTINUED | COMMUNITY
Start: 2020-05-18 | End: 2022-08-01

## 2022-08-01 RX ORDER — TRAMADOL HYDROCHLORIDE 50 MG/1
50 TABLET, COATED ORAL
Qty: 28 | Refills: 0 | Status: DISCONTINUED | COMMUNITY
Start: 2021-09-03 | End: 2022-08-01

## 2022-08-01 RX ORDER — ERGOCALCIFEROL 1.25 MG/1
1.25 MG CAPSULE, LIQUID FILLED ORAL
Qty: 4 | Refills: 1 | Status: DISCONTINUED | COMMUNITY
Start: 2021-12-09 | End: 2022-08-01

## 2022-08-01 NOTE — ASSESSMENT
[FreeTextEntry1] : Pt is clinically JEROME on exam today. \par \par U/A in office -+ nitrates + LE. Urine culture to be done at lab since small sample provided in office.  \par \par Vaginal culture was obtained and will call pt with results. \par \par Advised pt to follow up with her rheumatologist and to discuss elevated CA+ levels from Feb 2022. Pt is unsure if CA+ was repeated. She avoids CA+ supplements. \par \par Advised patient that Dr. Danielson moved out of [facility] to [place] and explained what her follow up care should be moving forward for patient. Pt can see Dr. Canales or Dr. Carmen for any issues.\par \par

## 2022-08-01 NOTE — HISTORY OF PRESENT ILLNESS
[8] : 8 [6] : 6 [de-identified] : 67 year old female followed for LEFT first CMC arthritis. 4.5 months s/p CSI. Symptoms reoccurred.  [FreeTextEntry1] : lt hand  [de-identified] : none

## 2022-08-01 NOTE — PHYSICAL EXAM
[Chaperone Present] : A chaperone was present in the examining room during all aspects of the physical examination [Abnormal] : Examination of vagina: Abnormal [Absent] : Adnexa(ae): Absent [Normal] : Anus and perineum: Normal sphincter tone, no masses, no prolapse. [Fully active, able to carry on all pre-disease performance without restriction] : Status 0 - Fully active, able to carry on all pre-disease performance without restriction [FreeTextEntry1] : Nicole Blair was present as chaperone during the entire physical examination. [de-identified] : mild erythema of vulva

## 2022-08-01 NOTE — REASON FOR VISIT
[FreeTextEntry1] : [facility]\par \par [facility] [phone] at [address] [place] [id number]\par

## 2022-08-01 NOTE — PROCEDURE
[Medium Joint Injection] : medium joint injection [Left] : of the left [CMC Joint] : CMC joint [Pain] : pain [Inflammation] : inflammation [Alcohol] : alcohol [Ethyl Chloride sprayed topically] : ethyl chloride sprayed topically [Sterile technique used] : sterile technique used [___ cc    1%] : Lidocaine ~Vcc of 1%  [___ cc    10mg] : Triamcinolone (Kenalog) ~Vcc of 10 mg  [Risks, benefits, alternatives discussed / Verbal consent obtained] : the risks benefits, and alternatives have been discussed, and verbal consent was obtained

## 2022-08-01 NOTE — HISTORY OF PRESENT ILLNESS
[FreeTextEntry1] : This 68y/o with history of stage 1A grade 1 endometrial cancer (no myometrial invasion) s/p RA TLH BSO b/l pelvic and para-aortic sentinel lymph node dissection, CHIOMA pineda on 03/27/18 presents for surveillance exam today. She denies any pelvic/abdominal pain or VB. She complains of vaginal burning with slight dysuria and increased urinary frequency. She is concerned she may have a UTI. Denies vaginal/vulvar itching. She is sexually active and uses lubricants. \par \par Pt was also found to have an elevated CA+ level of 10.7 and elevated ALE in Feb 2022. She was advised to avoid CA + supplements. Ionized CA was low at 1.06. She follows with rheumatology for Raynaud's.  She also follows with orthopedist for shoulder adhesive capsulitis and receives steroid injections with improvement  of pain. \par \par Health maintenance:\par \par [id number] by Dr. Payton Danielson-reports wnl \par [id number] wnl, polyps removed, family h/o colon cancer  (father)\par DEXA-5/2020-wnl \par \par

## 2022-08-05 LAB
A VAGINAE DNA VAG QL NAA+PROBE: NORMAL
BACTERIA UR CULT: ABNORMAL
BVAB2 DNA VAG QL NAA+PROBE: NORMAL
C KRUSEI DNA VAG QL NAA+PROBE: NEGATIVE
C TRACH RRNA SPEC QL NAA+PROBE: NEGATIVE
MEGA1 DNA VAG QL NAA+PROBE: NORMAL
N GONORRHOEA RRNA SPEC QL NAA+PROBE: NEGATIVE
T VAGINALIS RRNA SPEC QL NAA+PROBE: NEGATIVE

## 2022-08-24 NOTE — PATIENT PROFILE ADULT - BRADEN MOBILITY
[FreeTextEntry1] : Sched ex stress test to eval for evid of myocardial ischemia.\par Reassurance.\par Advised to eliminate marijuana and nicotine, cut down on caffeine.\par Encouraged psych eval for anxiety.\par Advised home BP monitoring and follow up 3 mo with readings.\par 
(4) no limitation

## 2022-09-22 NOTE — ASU PREOP CHECKLIST - AICD PRESENT
IF YOU SMOKE OR USE TOBACCO PLEASE READ THE FOLLOWING:  Why is smoking bad for me?  Smoking increases the risk of heart disease, lung disease, vascular disease, stroke, and cancer. If you smoke, STOP!    For more information:  Quit Now [place]  1-800-QUIT-NOW  https://[place].quitlogix.org/en-[place]/    Steps to Quit Smoking  Smoking tobacco is the leading cause of preventable death. It can affect almost every organ in the body. Smoking puts you and those around you at risk for developing many serious chronic diseases. Quitting smoking can be difficult, but it is one of the best things that you can do for your health. It is never too late to quit.  How do I get ready to quit?  When you decide to quit smoking, create a plan to help you succeed. Before you quit:  • Pick a date to quit. Set a date within the next 2 weeks to give you time to prepare.  • Write down the reasons why you are quitting. Keep this list in places where you will see it often.  • Tell your family, friends, and co-workers that you are quitting. Support from your loved ones can make quitting easier.  • Talk with your health care provider about your options for quitting smoking.  • Find out what treatment options are covered by your health insurance.  • Identify people, places, things, and activities that make you want to smoke (triggers). Avoid them.  What first steps can I take to quit smoking?  • Throw away all cigarettes at home, at work, and in your car.  • Throw away smoking accessories, such as ashtrays and lighters.  • Clean your car. Make sure to empty the ashtray.  • Clean your home, including curtains and carpets.  What strategies can I use to quit smoking?  Talk with your health care provider about combining strategies, such as taking medicines while you are also receiving in-person counseling. Using these two strategies together makes you more likely to succeed in quitting than if you used either strategy on its own.  • If you are  pregnant or breastfeeding, talk with your health care provider about finding counseling or other support strategies to quit smoking. Do not take medicine to help you quit smoking unless your health care provider tells you to do so.  To quit smoking:  Quit right away  • Quit smoking completely, instead of gradually reducing how much you smoke over a period of time. Research shows that stopping smoking right away is more successful than gradually quitting.  • Attend in-person counseling to help you build problem-solving skills. You are more likely to succeed in quitting if you attend counseling sessions regularly. Even short sessions of 10 minutes can be effective.  Take medicine  You may take medicines to help you quit smoking. Some medicines require a prescription and some you can purchase over-the-counter. Medicines may have nicotine in them to replace the nicotine in cigarettes. Medicines may:  • Help to stop cravings.  • Help to relieve withdrawal symptoms.  Your health care provider may recommend:  • Nicotine patches, gum, or lozenges.  • Nicotine inhalers or sprays.  • Non-nicotine medicine that is taken by mouth.  Find resources  Find resources and support systems that can help you to quit smoking and remain smoke-free after you quit. These resources are most helpful when you use them often. They include:  • Online chats with a counselor.  • Telephone quitlines.  • Printed self-help materials.  • Support groups or group counseling.  • Text messaging programs.  • Mobile phone apps or applications. Use apps that can help you stick to your quit plan by providing reminders, tips, and encouragement. There are many free apps for mobile devices as well as websites. Examples include Quit Guide from the CDC and smokefree.gov  What things can I do to make it easier to quit?    • Reach out to your family and friends for support and encouragement. Call telephone quitlines (5-800-QUIT-NOW), reach out to support groups, or  work with a counselor for support.  • Ask people who smoke to avoid smoking around you.  • Avoid places that trigger you to smoke, such as bars, parties, or smoke-break areas at work.  • Spend time with people who do not smoke.  • Lessen the stress in your life. Stress can be a smoking trigger for some people. To lessen stress, try:  ? Exercising regularly.  ? Doing deep-breathing exercises.  ? Doing yoga.  ? Meditating.  ? Performing a body scan. This involves closing your eyes, scanning your body from head to toe, and noticing which parts of your body are particularly tense. Try to relax the muscles in those areas.  How will I feel when I quit smoking?  Day 1 to 3 weeks  Within the first 24 hours of quitting smoking, you may start to feel withdrawal symptoms. These symptoms are usually most noticeable 2-3 days after quitting, but they usually do not last for more than 2-3 weeks. You may experience these symptoms:  • Mood swings.  • Restlessness, anxiety, or irritability.  • Trouble concentrating.  • Dizziness.  • Strong cravings for sugary foods and nicotine.  • Mild weight gain.  • Constipation.  • Nausea.  • Coughing or a sore throat.  • Changes in how the medicines that you take for unrelated issues work in your body.  • Depression.  • Trouble sleeping (insomnia).  Week 3 and afterward  After the first 2-3 weeks of quitting, you may start to notice more positive results, such as:  • Improved sense of smell and taste.  • Decreased coughing and sore throat.  • Slower heart rate.  • Lower blood pressure.  • Clearer skin.  • The ability to breathe more easily.  • Fewer sick days.  Quitting smoking can be very challenging. Do not get discouraged if you are not successful the first time. Some people need to make many attempts to quit before they achieve long-term success. Do your best to stick to your quit plan, and talk with your health care provider if you have any questions or concerns.  Summary  • Smoking tobacco  is the leading cause of preventable death. Quitting smoking is one of the best things that you can do for your health.  • When you decide to quit smoking, create a plan to help you succeed.  • Quit smoking right away, not slowly over a period of time.  • When you start quitting, seek help from your health care provider, family, or friends.  This information is not intended to replace advice given to you by your health care provider. Make sure you discuss any questions you have with your health care provider.  Document Revised: 09/11/2020 Document Reviewed: 03/07/2020  Elsevier Patient Education © 2022 Elsevier Inc.     no

## 2022-10-23 ENCOUNTER — NON-APPOINTMENT (OUTPATIENT)
Age: 67
End: 2022-10-23

## 2022-12-09 ENCOUNTER — APPOINTMENT (OUTPATIENT)
Dept: ORTHOPEDIC SURGERY | Facility: CLINIC | Age: 67
End: 2022-12-09

## 2022-12-09 VITALS — BODY MASS INDEX: 32.14 KG/M2 | WEIGHT: 200 LBS | HEIGHT: 66 IN

## 2022-12-09 DIAGNOSIS — Z47.1 AFTERCARE FOLLOWING JOINT REPLACEMENT SURGERY: ICD-10-CM

## 2022-12-09 DIAGNOSIS — Z96.651 AFTERCARE FOLLOWING JOINT REPLACEMENT SURGERY: ICD-10-CM

## 2022-12-09 DIAGNOSIS — Z96.652 AFTERCARE FOLLOWING JOINT REPLACEMENT SURGERY: ICD-10-CM

## 2022-12-09 PROCEDURE — 73562 X-RAY EXAM OF KNEE 3: CPT | Mod: LT

## 2022-12-09 PROCEDURE — 99213 OFFICE O/P EST LOW 20 MIN: CPT

## 2022-12-09 RX ORDER — LOSARTAN POTASSIUM 50 MG/1
50 TABLET, FILM COATED ORAL
Qty: 90 | Refills: 0 | Status: ACTIVE | COMMUNITY
Start: 2022-10-19

## 2022-12-09 RX ORDER — NIFEDIPINE 30 MG/1
30 TABLET, EXTENDED RELEASE ORAL
Qty: 90 | Refills: 0 | Status: ACTIVE | COMMUNITY
Start: 2022-11-28

## 2022-12-09 NOTE — HISTORY OF PRESENT ILLNESS
[0] : a current pain level of 0/10 [Pain Location] : pain [Stable] : stable [de-identified] : 59 y/o F pt presents for the left knee. She is s/p left TKA on 11/24/2021. She was happy with the surgical outcome. She denies pain, she is able to talke care of / play with grandchildren. She denies a stiffness weakness buckling or locking. She is s/p right TKA 3 years ago and is doing well.

## 2022-12-09 NOTE — PHYSICAL EXAM
[LE] : Sensory: Intact in bilateral lower extremities [ALL] : dorsalis pedis, posterior tibial, femoral, popliteal, and radial 2+ and symmetric bilaterally [Normal] : Alert and in no acute distress [Poor Appearance] : well-appearing [de-identified] : GENERAL APPEARANCE: Well nourished and hydrated, pleasant, alert, and oriented x 3. Appears their stated age. \par HEENT: Normocephalic, extraocular eye motion intact. Nasal septum midline. Oral cavity clear. External auditory canal clear. \par RESPIRATORY: Breath sounds clear and audible in all lobes. No wheezing, No accessory muscle use.\par CARDIOVASCULAR: No apparent abnormalities. No lower leg edema. No varicosities. Pedal pulses are palpable.\par NEUROLOGIC: Sensation is normal, no muscle weakness in the upper or lower extremities.\par DERMATOLOGIC: No apparent skin lesions, moist, warm, no rash.\par SPINE: Cervical spine appears normal and moves freely; thoracic spine appears normal and moves freely; lumbosacral spine appears normal and moves freely, normal, nontender.\par MUSCULOSKELETAL: Hands, wrists, and elbows are normal and move freely, shoulders are normal and move freely.  [de-identified] : Left knee exam shows healed incision with no sign of infection. ROM 0 to 120 degree without pain no antalgic gait no effusion  [de-identified] : 3V xray of the left knee done in office today and reviewed by Dr. Karlos De La Vega demonstrates s/p  total knee implants in good positioning with no evidence of wear, loosening, or subsidence.

## 2022-12-09 NOTE — DISCUSSION/SUMMARY
[de-identified] : 66 y/o F pt presents with left knee pain. She is s/p left TKA on 11/24/2021. Based on today's imaging, we reassured the pt that her implants are stable with no evidence of aseptic loosening or wear. She is doing well and is happy with the surgical outcome. She should continue to do low impact exercises. Pt understands the importance of prophylaxis for invasive dental procedures. F/u with us in a year for radiographic surveillance. \par \par The patient is a 67 year individual with end stage arthritis of their left knee joint. Based upon the patient's continued symptoms and failure to respond to conservative treatment (including HA injections, cortisone injections, over the counter medications, and PT), pt successfully completed left total knee arthroplasty. A long discussion took place with the patient describing what a total joint replacement is and what the procedure would entail. A total knee arthroplasty model, similar to the implant that was used during the operation, was utilized to demonstrate and to discuss the various bearing surfaces of the implants. The hospitalization and post-operative care and rehabilitation were also discussed. The use of perioperative antibiotics and DVT prophylaxis were discussed. The risk, benefits and alternatives to a surgical intervention were discussed at length with the patient. The patient was also advised of risks related to the medical comorbidities, elevated body mass index (BMI), and smoking where applicable. We discussed how to reduce modifiable risk factors and encouraged smoking cessation were applicable. A lengthy discussion took place to review the most common complications including but not limited to: deep vein thrombosis, pulmonary embolus, heart attack, stroke, infection, wound breakdown, numbness, damage to nerves, tendon, muscles, arteries or other blood vessels, death and other possible complications from anesthesia. The patient was told that we will take steps to minimize these risks by using sterile technique, antibiotics and DVT prophylaxis when appropriate and follow the patient postoperatively in the office setting to monitor progress. The possibility of recurrent pain, no improvement in pain and actual worsening of pain were also discussed with the patient.\par The discharge plan of care focused on the patient going home following surgery. The patient was encouraged to make the necessary arrangements to have someone stay with them when they are discharged home. Following discharge, a home care nurse was to the patient. The home care nurse would open the patient’s home care case and request home physical therapy services. Home physical therapy was to commence following discharge provided it was appropriate and covered by the health insurance benefit plan. \par The benefits of surgery were discussed with the patient including the potential for improving her current clinical condition through operative intervention. Alternatives to surgical intervention including continued conservative management were also discussed in detail. All questions were answered to the satisfaction of the patient. The treatment plan of care, as well as a model of a total knee arthroplasty equivalent to the one that will be used for their total joint replacement, was shared with the patient. The patient agreed to the plan of care as well as the use of implants in their total joint replacement.

## 2023-01-27 ENCOUNTER — NON-APPOINTMENT (OUTPATIENT)
Age: 68
End: 2023-01-27

## 2023-02-03 NOTE — DISCHARGE NOTE PROVIDER - CARE PROVIDER_API CALL
Karlos De La Vega)  Orthopaedic Surgery  [address], [facility], [address]  [place] [address]  Phone: [phone]  Fax: [phone]  Follow Up Time:    No

## 2023-02-14 ENCOUNTER — APPOINTMENT (OUTPATIENT)
Dept: GYNECOLOGIC ONCOLOGY | Facility: CLINIC | Age: 68
End: 2023-02-14
Payer: MEDICARE

## 2023-02-14 VITALS
OXYGEN SATURATION: 97 % | HEART RATE: 94 BPM | SYSTOLIC BLOOD PRESSURE: 103 MMHG | DIASTOLIC BLOOD PRESSURE: 72 MMHG | WEIGHT: 198 LBS | HEIGHT: 66 IN | BODY MASS INDEX: 31.82 KG/M2

## 2023-02-14 DIAGNOSIS — C54.1 MALIGNANT NEOPLASM OF ENDOMETRIUM: ICD-10-CM

## 2023-02-14 PROCEDURE — 99212 OFFICE O/P EST SF 10 MIN: CPT

## 2023-02-14 RX ORDER — FLUTICASONE PROPIONATE 50 UG/1
50 SPRAY, METERED NASAL
Qty: 16 | Refills: 0 | Status: DISCONTINUED | COMMUNITY
Start: 2022-06-23 | End: 2023-02-14

## 2023-02-14 RX ORDER — AMOXICILLIN 500 MG/1
500 CAPSULE ORAL
Qty: 20 | Refills: 0 | Status: DISCONTINUED | COMMUNITY
Start: 2022-08-01 | End: 2023-02-14

## 2023-02-14 RX ORDER — AZELASTINE HYDROCHLORIDE 137 UG/1
137 SPRAY, METERED NASAL
Qty: 30 | Refills: 0 | Status: DISCONTINUED | COMMUNITY
Start: 2022-10-24 | End: 2023-02-14

## 2023-02-14 RX ORDER — HYDROCODONE BITARTRATE AND ACETAMINOPHEN 5; 325 MG/1; MG/1
5-325 TABLET ORAL
Qty: 12 | Refills: 0 | Status: DISCONTINUED | COMMUNITY
Start: 2022-10-17 | End: 2023-02-14

## 2023-02-14 RX ORDER — PREDNISOLONE ACETATE 10 MG/ML
1 SUSPENSION/ DROPS OPHTHALMIC
Qty: 5 | Refills: 0 | Status: DISCONTINUED | COMMUNITY
Start: 2022-10-17 | End: 2023-02-14

## 2023-02-14 RX ORDER — GUAIFENESIN AND CODEINE PHOSPHATE 10; 100 MG/5ML; MG/5ML
100-10 SOLUTION ORAL
Qty: 118 | Refills: 0 | Status: DISCONTINUED | COMMUNITY
Start: 2022-09-22 | End: 2023-02-14

## 2023-02-14 RX ORDER — OFLOXACIN 3 MG/ML
0.3 SOLUTION/ DROPS OPHTHALMIC
Qty: 5 | Refills: 0 | Status: DISCONTINUED | COMMUNITY
Start: 2022-10-17 | End: 2023-02-14

## 2023-02-14 RX ORDER — NITROFURANTOIN (MONOHYDRATE/MACROCRYSTALS) 25; 75 MG/1; MG/1
100 CAPSULE ORAL
Qty: 14 | Refills: 0 | Status: DISCONTINUED | COMMUNITY
Start: 2022-08-01 | End: 2023-02-14

## 2023-02-14 RX ORDER — BENZONATATE 100 MG/1
100 CAPSULE ORAL
Qty: 30 | Refills: 0 | Status: DISCONTINUED | COMMUNITY
Start: 2022-10-24 | End: 2023-02-14

## 2023-02-14 RX ORDER — CYCLOBENZAPRINE HYDROCHLORIDE 10 MG/1
10 TABLET, FILM COATED ORAL
Qty: 20 | Refills: 0 | Status: DISCONTINUED | COMMUNITY
Start: 2022-07-15 | End: 2023-02-14

## 2023-02-14 NOTE — REASON FOR VISIT
[FreeTextEntry1] : [facility]\par \par [facility] [phone] at [address] [place] [id number]\par

## 2023-02-14 NOTE — HISTORY OF PRESENT ILLNESS
[FreeTextEntry1] : This 67y/o with history of stage 1A grade 1 endometrial cancer (no myometrial invasion) s/p RA TLH BSO b/l pelvic and para-aortic sentinel lymph node dissection, CHIOMA pineda on 03/27/18 presents for surveillance exam today. She denies any pelvic/abdominal pain or VB.  Denies vaginal/vulvar itching. She is sexually active and uses lubricants. \par \par Pt was also found to have an elevated CA+ level of 10.7 and elevated ALE in Feb 2022. She was advised to avoid CA + supplements. Ionized CA was low at 1.06. She follows with rheumatology for Raynaud's. She also follows with orthopedist for shoulder adhesive capsulitis and receives steroid injections with improvement of pain. \par \par She reports having a recent mammo ordered by Dr. Payton Danielson with abnormality found in left breast. Underwent MRI breasts-Birad 4 and is scheduled for breast biopsy. \par \par Health maintenance:\par \par [id number] by Dr. Payton Danielson-reports wnl \par [id number] wnl, polyps removed, family h/o colon cancer (father)\par DEXA-5/2020-wnl \par

## 2023-02-14 NOTE — ASSESSMENT
[FreeTextEntry1] : 69y/o with history of stage 1A grade 1 endometrial cancer (no myometrial invasion) s/p RA TLH BSO b/l pelvic and para-aortic sentinel lymph node dissection, CHIOMA pineda on 03/27/18.\par \par Pt is clinically JEROME on exam today. \par \par I discussed with the patient that she is now approximately five years from her gynecological cancer diagnosis.  I explained to the patient that, while the cure rate for any gynecological cancer is not 100%, the risk of her cancer recurring after five years is very small.  I explained that it is our routine for patients to be discharged from my practice at this important milestone and the importance of routine gynecological examinations with her gynecologist was discussed.  The signs and symptoms of recurrence were reviewed and the patient knows to return to see me if she has any of these or any other concerns.\par

## 2023-02-14 NOTE — PHYSICAL EXAM
[Chaperone Present] : A chaperone was present in the examining room during all aspects of the physical examination [Abnormal] : Examination of vagina: Abnormal [Absent] : Adnexa(ae): Absent [Normal] : Anus and perineum: Normal sphincter tone, no masses, no prolapse. [Fully active, able to carry on all pre-disease performance without restriction] : Status 0 - Fully active, able to carry on all pre-disease performance without restriction [FreeTextEntry1] : Nicole Blair was present as chaperone during the entire physical examination.

## 2023-02-17 RX ORDER — AMOXICILLIN 500 MG/1
500 TABLET, FILM COATED ORAL
Qty: 4 | Refills: 3 | Status: ACTIVE | COMMUNITY
Start: 2023-02-17 | End: 1900-01-01

## 2023-03-01 ENCOUNTER — APPOINTMENT (OUTPATIENT)
Dept: RHEUMATOLOGY | Facility: CLINIC | Age: 68
End: 2023-03-01

## 2023-03-02 ENCOUNTER — NON-APPOINTMENT (OUTPATIENT)
Age: 68
End: 2023-03-02

## 2023-03-13 ENCOUNTER — APPOINTMENT (OUTPATIENT)
Dept: ORTHOPEDIC SURGERY | Facility: CLINIC | Age: 68
End: 2023-03-13
Payer: MEDICARE

## 2023-03-13 VITALS — WEIGHT: 198 LBS | BODY MASS INDEX: 31.82 KG/M2 | HEIGHT: 66 IN

## 2023-03-13 DIAGNOSIS — M75.82 OTHER SHOULDER LESIONS, LEFT SHOULDER: ICD-10-CM

## 2023-03-13 DIAGNOSIS — M75.22 BICIPITAL TENDINITIS, LEFT SHOULDER: ICD-10-CM

## 2023-03-13 PROCEDURE — 99214 OFFICE O/P EST MOD 30 MIN: CPT

## 2023-03-13 PROCEDURE — 73030 X-RAY EXAM OF SHOULDER: CPT | Mod: 50

## 2023-03-13 NOTE — ADDENDUM
[FreeTextEntry1] : Documented by Alondra Taylor acting as a scribe for Dr. Sullivan and Ross Ríos PA-C on 03/13/2023.   All medical record entries made by the Scribe were at my, Dr. Sullivan, and Ross Ríos's, direction and personally dictated by me on 03/13/2023. I have reviewed the chart and agree that the record accurately reflects my personal performance of the history, physical exam, procedure and imaging.

## 2023-03-13 NOTE — PHYSICAL EXAM
[de-identified] : Physical Exam: \par General: Well appearing, no acute distress \par Neurologic: A&Ox3, No focal deficits \par Head: NCAT without abrasions, lacerations, or ecchymosis to head, face, or scalp \par Eyes: No scleral icterus, no gross abnormalities \par Respiratory: Equal chest wall expansion bilaterally, no accessory muscle use \par Lymphatic: No lymphadenopathy palpated \par Skin: Warm and dry \par Psychiatric: Normal mood and affect\par \par Examination of the Left shoulder shows no obvious deformity, swelling or erythema. Mild tenderness to palpation over the anterior shoulder. No AC joint tenderness. The patient demonstrates active/passive ROM of Forward Flexion to 150 degrees, External Rotation to 70 degrees and Internal Rotation to a mid lumbar level. The patient has a positive Ordonez and Neers test. No pain with cross body adduction, lift off testing, AC compression testing or Yergason testing. The patient has 4/5 strength to forward flexion with pronation, internal and external rotation. Compartments are soft and nontender. The patient has 2+ cap refill and sensation is intact in the hand. \par \par Examination of the Right shoulder shows no obvious deformity, swelling or erythema. Mild tenderness to palpation over the anterior shoulder. No AC joint tenderness. The patient demonstrates active/passive ROM of Forward Flexion to 150 degrees, External Rotation to 80 degrees and Internal Rotation to a mid lumbar level. The patient has a positive Ordonez and Neers test. No pain with cross body adduction, lift off testing, AC compression testing or Yergason testing. The patient has 4/5 strength to forward flexion with pronation, internal and external rotation. Compartments are soft and nontender. The patient has 2+ cap refill and sensation is intact in the hand. \par  [de-identified] : The following radiographs were ordered and read by me during this patients visit. I reviewed each radiograph in detail with the patient and discussed the findings as highlighted below.   \par \par 4 views of the right shoulder show no fracture, dislocation or bony lesions. There is no evidence of degenerative change in the glenohumeral and acromioclavicular joints with maintenance of the joint space. Otherwise unremarkable. \par \par 4 views of the left shoulder show no fracture, dislocation or bony lesions. There is no evidence of degenerative change in the glenohumeral and acromioclavicular joints with maintenance of the joint space. Otherwise unremarkable.

## 2023-03-13 NOTE — HISTORY OF PRESENT ILLNESS
[de-identified] : Patient is a 68-year-old female right-hand-dominant here today for evaluation of "heaviness" in both of her shoulders.  She has occasional left elbow pain as well.  She describes weakness in both arms.  She has a history of brain cancer with removal of her pituitary gland.  Since that time she has felt weak.  She reports decreasing activities including lifting her granddaughter.  She does not have any specific pain in both shoulders.  She has a history of a left shoulder arthroscopy with debridement and decompression by Dr. Chow several years ago.  She is not taking any medications for this.

## 2023-03-13 NOTE — DISCUSSION/SUMMARY
[de-identified] : We had a thorough discussion regarding the nature of her pain, the pathophysiology, as well as all treatment options. Based on clinical exam and radiograph findings she has rotator cuff tendinitis of bilateral shoulders and bicipital tendinitis of the left shoulder. Patient was given prescription of formal physical therapy that she will perform 2x/wk for 6-8 wks. If symptoms persist or worsen she should follow up for repeat clinical assessment. All questions were answered and the patient verbalized understanding. The patient is in agreement with this treatment plan.

## 2023-04-03 ENCOUNTER — APPOINTMENT (OUTPATIENT)
Dept: ORTHOPEDIC SURGERY | Facility: CLINIC | Age: 68
End: 2023-04-03
Payer: MEDICARE

## 2023-04-03 VITALS — HEIGHT: 66 IN | BODY MASS INDEX: 30.86 KG/M2 | WEIGHT: 192 LBS

## 2023-04-03 DIAGNOSIS — Z86.39 PERSONAL HISTORY OF OTHER ENDOCRINE, NUTRITIONAL AND METABOLIC DISEASE: ICD-10-CM

## 2023-04-03 PROCEDURE — 99213 OFFICE O/P EST LOW 20 MIN: CPT | Mod: 25

## 2023-04-03 PROCEDURE — 73110 X-RAY EXAM OF WRIST: CPT | Mod: RT

## 2023-04-03 PROCEDURE — 20605 DRAIN/INJ JOINT/BURSA W/O US: CPT | Mod: NC

## 2023-04-03 NOTE — HISTORY OF PRESENT ILLNESS
[8] : 8 [3] : 3 [Dull/Aching] : dull/aching [Stabbing] : stabbing [Injection therapy] : injection therapy [Retired] : Work status: retired [de-identified] : 68 year old female followed for LT first [facility] arthritis. Last CSI in august,. Also c/o of pain at the base of the right thumb for the past 1 month.  [] : no [FreeTextEntry1] : left and right thumb  [FreeTextEntry5] : PT p/w bilateral thumb pain, hx of arthritis in the left thumb had CSI 8/1/22 with relief. she reports the right hand began about 1 month ago.  [de-identified] : lifting, grabbing

## 2023-04-03 NOTE — DISCUSSION/SUMMARY
[de-identified] : Discussed the nature of the diagnosis and risk and benefits of different modalities of treatment.\par Xrays reviewed.\par Discussed conservative management vs CSI. \par Pt would like a CSI in [facility] [place] [facility]. \par Done today and tolerated well.\par All questions answered.\par  Minocycline Counseling: Patient advised regarding possible photosensitivity and discoloration of the teeth, skin, lips, tongue and gums.  Patient instructed to avoid sunlight, if possible.  When exposed to sunlight, patients should wear protective clothing, sunglasses, and sunscreen.  The patient was instructed to call the office immediately if the following severe adverse effects occur:  hearing changes, easy bruising/bleeding, severe headache, or vision changes.  The patient verbalized understanding of the proper use and possible adverse effects of minocycline.  All of the patient's questions and concerns were addressed.

## 2023-05-20 NOTE — OCCUPATIONAL THERAPY INITIAL EVALUATION ADULT - OCCUPATION
End of Shift Note: Medical    What matters most to the patient this shift: pain control     Significant Events: IV dilaudid changed to Q4    Pain Management: Last Pain Score: Numeric Rating Scale 0-10: 7 (05/20/23 1418)      Pain medication:  Dilaudid .  Last given: 1623  Diet: Regular Diet      Bowel Function:  Normal  LBM:      Activity:  Activity: Resting in bed (05/20/23 1546)  Mobility Assistive Device:     Level of Assistance:  Level of Assistance: Independent (05/20/23 1546)  Positioning: Positioning: Semi-fowlers (05/20/23 1546)  LDAs: peripheral IV   Patient's Anticipated Discharge Needs: Other (comment) (return to [facility]) (05/19/23 6873)  Expected Discharge Date:   Expected Discharge Time:        retired

## 2023-05-25 NOTE — END OF VISIT
Patient would like to have an EGD along with her Colonoscopy     Per last OV:  5/17/23-patient is complaining of heart burn symptoms.    Thank you,  Yolanda Rollins RN  GI Pre-Admit       [FreeTextEntry3] : I, Mya De Leon, acted solely as a scribe for Dr. Karlos De La Vega on 12/09/2022

## 2023-06-05 NOTE — PATIENT PROFILE ADULT - NSPROPTRIGHTBILLOFRIGHTS_GEN_A_NUR
I refilled all meds for 30 days   
Made apt for 7/6.   
NOT SEEN SINCE 2/2022 - I DID NOT REFILL MED TODAY   
patient

## 2023-07-11 NOTE — PATIENT PROFILE ADULT - PRO INTERPRETER NEED 2
English Cyclophosphamide Counseling:  I discussed with the patient the risks of cyclophosphamide including but not limited to hair loss, hormonal abnormalities, decreased fertility, abdominal pain, diarrhea, nausea and vomiting, bone marrow suppression and infection. The patient understands that monitoring is required while taking this medication.

## 2023-09-15 ASSESSMENT — KOOS JR
IMPORTED KOOS JR SCORE: 70.7
IMPORTED FORM: YES
KOOS JR RAW SCORE: 12
HOW SEVERE IS YOUR KNEE STIFFNESS AFTER FIRST WAKING IN MORNING: SEVERE
STANDING UPRIGHT: MILD
BENDING TO THE FLOOR TO PICK UP OBJECT: MODERATE
IMPORTED KOOS JR SCORE: 57.14
KOOS JR RAW SCORE: 8
HOW SEVERE IS YOUR KNEE STIFFNESS AFTER FIRST WAKING IN MORNING: MODERATE
GOING UP OR DOWN STAIRS: MODERATE
STRAIGHTENING KNEE FULLY: MODERATE
KOOS JR RAW SCORE: 6
BENDING TO THE FLOOR TO PICK UP OBJECT: SEVERE
IMPORTED KOOS JR SCORE: 44.91
KOOS JR RAW SCORE: 17
HOW SEVERE IS YOUR KNEE STIFFNESS AFTER FIRST WAKING IN MORNING: MILD
RISING FROM SITTING: MILD
STRAIGHTENING KNEE FULLY: MILD
GOING UP OR DOWN STAIRS: MILD
TWISING OR PIVOTING ON KNEE: SEVERE
BENDING TO THE FLOOR TO PICK UP OBJECT: MILD
TWISING OR PIVOTING ON KNEE: MILD
TWISING OR PIVOTING ON KNEE: MODERATE
RISING FROM SITTING: MODERATE
IMPORTED LATERALITY: RIGHT
STANDING UPRIGHT: MODERATE
IMPORTED KOOS JR SCORE: 65.99

## 2023-09-20 ENCOUNTER — APPOINTMENT (OUTPATIENT)
Dept: ORTHOPEDIC SURGERY | Facility: CLINIC | Age: 68
End: 2023-09-20
Payer: MEDICARE

## 2023-09-20 VITALS
HEART RATE: 81 BPM | BODY MASS INDEX: 29.25 KG/M2 | SYSTOLIC BLOOD PRESSURE: 116 MMHG | DIASTOLIC BLOOD PRESSURE: 76 MMHG | HEIGHT: 66 IN | WEIGHT: 182 LBS

## 2023-09-20 PROCEDURE — 99214 OFFICE O/P EST MOD 30 MIN: CPT | Mod: 25

## 2023-09-20 PROCEDURE — 73502 X-RAY EXAM HIP UNI 2-3 VIEWS: CPT

## 2023-10-03 ENCOUNTER — OUTPATIENT (OUTPATIENT)
Dept: OUTPATIENT SERVICES | Facility: HOSPITAL | Age: 68
LOS: 1 days | End: 2023-10-03

## 2023-10-03 ENCOUNTER — APPOINTMENT (OUTPATIENT)
Dept: MRI IMAGING | Facility: CLINIC | Age: 68
End: 2023-10-03
Payer: MEDICARE

## 2023-10-03 DIAGNOSIS — Z98.890 OTHER SPECIFIED POSTPROCEDURAL STATES: Chronic | ICD-10-CM

## 2023-10-03 DIAGNOSIS — Z90.49 ACQUIRED ABSENCE OF OTHER SPECIFIED PARTS OF DIGESTIVE TRACT: Chronic | ICD-10-CM

## 2023-10-03 DIAGNOSIS — M70.61 TROCHANTERIC BURSITIS, RIGHT HIP: ICD-10-CM

## 2023-10-03 DIAGNOSIS — Z90.710 ACQUIRED ABSENCE OF BOTH CERVIX AND UTERUS: Chronic | ICD-10-CM

## 2023-10-03 DIAGNOSIS — D35.2 BENIGN NEOPLASM OF PITUITARY GLAND: Chronic | ICD-10-CM

## 2023-10-03 DIAGNOSIS — Z96.651 PRESENCE OF RIGHT ARTIFICIAL KNEE JOINT: Chronic | ICD-10-CM

## 2023-10-03 PROCEDURE — 73721 MRI JNT OF LWR EXTRE W/O DYE: CPT | Mod: 26,RT

## 2023-10-14 ENCOUNTER — NON-APPOINTMENT (OUTPATIENT)
Age: 68
End: 2023-10-14

## 2023-12-20 ENCOUNTER — APPOINTMENT (OUTPATIENT)
Dept: ORTHOPEDIC SURGERY | Facility: CLINIC | Age: 68
End: 2023-12-20
Payer: MEDICARE

## 2023-12-20 VITALS
SYSTOLIC BLOOD PRESSURE: 120 MMHG | HEART RATE: 87 BPM | HEIGHT: 66 IN | DIASTOLIC BLOOD PRESSURE: 74 MMHG | WEIGHT: 182 LBS | BODY MASS INDEX: 29.25 KG/M2

## 2023-12-20 DIAGNOSIS — Z96.651 PRESENCE OF RIGHT ARTIFICIAL KNEE JOINT: ICD-10-CM

## 2023-12-20 DIAGNOSIS — Z96.652 PRESENCE OF LEFT ARTIFICIAL KNEE JOINT: ICD-10-CM

## 2023-12-20 DIAGNOSIS — M70.61 TROCHANTERIC BURSITIS, RIGHT HIP: ICD-10-CM

## 2023-12-20 PROCEDURE — 99214 OFFICE O/P EST MOD 30 MIN: CPT | Mod: 25

## 2023-12-20 PROCEDURE — 20610 DRAIN/INJ JOINT/BURSA W/O US: CPT | Mod: RT

## 2023-12-20 PROCEDURE — 73502 X-RAY EXAM HIP UNI 2-3 VIEWS: CPT | Mod: RT

## 2023-12-29 ENCOUNTER — APPOINTMENT (OUTPATIENT)
Age: 68
End: 2023-12-29
Payer: MEDICARE

## 2023-12-29 VITALS
BODY MASS INDEX: 28.93 KG/M2 | WEIGHT: 180 LBS | HEIGHT: 66 IN | SYSTOLIC BLOOD PRESSURE: 110 MMHG | HEART RATE: 87 BPM | TEMPERATURE: 98.1 F | DIASTOLIC BLOOD PRESSURE: 78 MMHG

## 2023-12-29 DIAGNOSIS — M16.11 UNILATERAL PRIMARY OSTEOARTHRITIS, RIGHT HIP: ICD-10-CM

## 2023-12-29 PROCEDURE — 99213 OFFICE O/P EST LOW 20 MIN: CPT | Mod: 25

## 2023-12-29 PROCEDURE — 20610 DRAIN/INJ JOINT/BURSA W/O US: CPT | Mod: RT

## 2023-12-29 NOTE — PROCEDURE
[de-identified] : I injected the patient's right hip bursa today with cortisone for greater trochanteric bursitis.  I discussed at length with the patient the planned steroid and lidocaine injection. The risks, benefits, convalescence and alternatives were reviewed. The possible side effects discussed included but were not limited to: pain, swelling, heat, bleeding, and redness. Symptoms are generally mild but if they are extensive then contact the office. Giving pain relievers by mouth such as NSAIDs or Tylenol can generally treat the reactions to steroid and lidocaine. Rare cases of infection have been noted. Rash, hives and itching may occur post injection. If you have muscle pain or cramps, flushing and or swelling of the face, rapid heart beat, nausea, dizziness, fever, chills, headache, difficulty breathing, swelling in the arms or legs, or have a prickly feeling of your skin, contact a health care provider immediately. Following this discussion, the hip was prepped with Alcohol and under sterile condition the 80 mg Depo-Medrol and 6 cc Lidocaine injection was performed with a 20 gauge needle through a superolateral injection site. The needle was introduced into the joint, aspiration was performed to ensure intra-articular placement and the medication was injected. Upon withdrawal of the needle the site was cleaned with alcohol and a band aid applied. The patient tolerated the injection well and there were no adverse effects. Post injection instructions included no strenuous activity for 24 hours, cryotherapy and if there are any adverse effects to contact the office.

## 2023-12-29 NOTE — PHYSICAL EXAM
[LE] : Sensory: Intact in bilateral lower extremities [ALL] : dorsalis pedis, posterior tibial, femoral, popliteal, and radial 2+ and symmetric bilaterally [de-identified] : GENERAL APPEARANCE: Well nourished and hydrated, pleasant, alert, and oriented x 3. Appears their stated age.  HEENT: Normocephalic, extraocular eye motion intact. Nasal septum midline. Oral cavity clear. External auditory canal clear.  RESPIRATORY: Breath sounds clear and audible in all lobes. No wheezing, No accessory muscle use. CARDIOVASCULAR: No apparent abnormalities. No lower leg edema. No varicosities. Pedal pulses are palpable. NEUROLOGIC: Sensation is normal, no muscle weakness in the upper or lower extremities. DERMATOLOGIC: No apparent skin lesions, moist, warm, no rash. SPINE: Cervical spine appears normal and moves freely; thoracic spine appears normal and moves freely; lumbosacral spine appears normal and moves freely, normal, nontender. MUSCULOSKELETAL: Hands, wrists, and elbows are normal and move freely, shoulders are normal and move freely.  [de-identified] : Right hip exam shows greater trochanteric tenderness, groin pain with straight leg raises.  [de-identified] : 3V xray of the right hip done in the office today and reviewed by Dr. Karlos De La Vega demonstrates	mild right hip osteoarthritis.

## 2023-12-29 NOTE — PHYSICAL EXAM
[LE] : Sensory: Intact in bilateral lower extremities [ALL] : dorsalis pedis, posterior tibial, femoral, popliteal, and radial 2+ and symmetric bilaterally [de-identified] : GENERAL APPEARANCE: Well nourished and hydrated, pleasant, alert, and oriented x 3. Appears their stated age.  HEENT: Normocephalic, extraocular eye motion intact. Nasal septum midline. Oral cavity clear. External auditory canal clear.  RESPIRATORY: Breath sounds clear and audible in all lobes. No wheezing, No accessory muscle use. CARDIOVASCULAR: No apparent abnormalities. No lower leg edema. No varicosities. Pedal pulses are palpable. NEUROLOGIC: Sensation is normal, no muscle weakness in the upper or lower extremities. DERMATOLOGIC: No apparent skin lesions, moist, warm, no rash. SPINE: Cervical spine appears normal and moves freely; thoracic spine appears normal and moves freely; lumbosacral spine appears normal and moves freely, normal, nontender. MUSCULOSKELETAL: Hands, wrists, and elbows are normal and move freely, shoulders are normal and move freely.  [de-identified] : left hip exam shows greater trochanteric tenderness, groin pain with straight leg raises.  [de-identified] : 3V xray of the left hip done in the office today and reviewed by Dr. Karlos De La Vega demonstrates	mild right hip osteoarthritis.

## 2023-12-29 NOTE — PROCEDURE
[de-identified] : I injected the patient's left hip bursa today with cortisone for greater trochanteric bursitis.  I discussed at length with the patient the planned steroid and lidocaine injection. The risks, benefits, convalescence and alternatives were reviewed. The possible side effects discussed included but were not limited to: pain, swelling, heat, bleeding, and redness. Symptoms are generally mild but if they are extensive then contact the office. Giving pain relievers by mouth such as NSAIDs or Tylenol can generally treat the reactions to steroid and lidocaine. Rare cases of infection have been noted. Rash, hives and itching may occur post injection. If you have muscle pain or cramps, flushing and or swelling of the face, rapid heart beat, nausea, dizziness, fever, chills, headache, difficulty breathing, swelling in the arms or legs, or have a prickly feeling of your skin, contact a health care provider immediately. Following this discussion, the hip was prepped with Alcohol and under sterile condition the 80 mg Depo-Medrol and 6 cc Lidocaine injection was performed with a 20 gauge needle through a superolateral injection site. The needle was introduced into the joint, aspiration was performed to ensure intra-articular placement and the medication was injected. Upon withdrawal of the needle the site was cleaned with alcohol and a band aid applied. The patient tolerated the injection well and there were no adverse effects. Post injection instructions included no strenuous activity for 24 hours, cryotherapy and if there are any adverse effects to contact the office.

## 2023-12-29 NOTE — HISTORY OF PRESENT ILLNESS
[de-identified] : 69 y/o F pt presents with right hip pain. The pt has a hx of mild right hip .oa. The patient does have history of trochanteric bursitis bilaterally.  She was here about a week ago for left stroke bursitis and had a left stroke injection.  She reports she did very well with this injection and that her left hip feels good she comes in today for injection on the right side  s/p right TKA on 7/30/2019 and s/p left TKA on DOS: 11/24/21. She reports her knees are doing very well.   [Hip Movement] : worsened by hip movement [Walking] : worsened by walking [Rest] : relieved by rest

## 2023-12-29 NOTE — HISTORY OF PRESENT ILLNESS
[Hip Movement] : worsened by hip movement [Walking] : worsened by walking [Rest] : relieved by rest [de-identified] : 69 y/o F pt presents with left hip pain. She has history of right hip bursitis and has had injections in the past. She reports left sided pain worse wtih laying on her left side.   s/p right TKA on 7/30/2019 and s/p left TKA on DOS: 11/24/21. She reports her knees are doing very well.

## 2023-12-29 NOTE — DISCUSSION/SUMMARY
[Medication Risks Reviewed] : Medication risks reviewed [de-identified] : 67 y/o F pt presents with mild right hip osteoarthritis and hip bursitis. The nature of her condition and treatment options were discussed in detail. The pt is not a candidate for a right GENIA. We recommend starting with conservative treatment such as Bursa injections, low impact exercise, and PT. The pt opted for a right hip bursa injection which she tolerated well. If the pt does not show improvement with right hip bursa injection, we recommend an MRI of the right hip to better understand the etiology of her pain. The pt will f/u in 3 months.

## 2023-12-29 NOTE — DISCUSSION/SUMMARY
[Medication Risks Reviewed] : Medication risks reviewed [de-identified] : 67 y/o F pt presents with mild left hip osteoarthritis and hip bursitis. The nature of her condition and treatment options were discussed in detail. The pt is not a candidate for a left GENIA. We recommend starting with conservative treatment such as Bursa injections, low impact exercise, and PT. The pt opted for a right hip bursa injection which she tolerated well. If the pt does not show improvement with right hip bursa injection, we recommend an MRI of the right hip to better understand the etiology of her pain. The pt will f/u in 3 months.

## 2024-01-17 NOTE — H&P PST ADULT - NS SC CAGE ALCOHOL ANNOYED YOU
Patient called for refill stating she only had enough for 2 more days.  Reeducated patient on refill process and let her know the refill may not be to her pharmacy until the beginning of next week.  She verbalized understanding.      
no

## 2024-02-29 ENCOUNTER — APPOINTMENT (OUTPATIENT)
Dept: ORTHOPEDIC SURGERY | Facility: CLINIC | Age: 69
End: 2024-02-29
Payer: MEDICARE

## 2024-02-29 PROCEDURE — 99214 OFFICE O/P EST MOD 30 MIN: CPT | Mod: 25

## 2024-02-29 PROCEDURE — 20611 DRAIN/INJ JOINT/BURSA W/US: CPT | Mod: RT

## 2024-02-29 NOTE — ADDENDUM
[FreeTextEntry1] : Documented by Paula Basilio acting as a scribe for Dr. Sullivan on 02/29/2024. All medical record entries made by the Scribe were at my, Dr. Sullivan's, direction and personally dictated by me on 02/29/2024. I have reviewed the chart and agree that the record accurately reflects my personal performance of the history, physical exam, procedure and imaging.

## 2024-02-29 NOTE — DISCUSSION/SUMMARY
[de-identified] : We had a thorough discussion regarding the nature of her pain, the pathophysiology, as well as all treatment options. Pt due to her acute pain elected for a corticosteroid injection at today's visit and tolerated the procedure well. She should take it easy for the next 2-3 days while icing the joint. All questions were answered and the patient verbalized understanding. The patient is in agreement with this treatment plan.

## 2024-02-29 NOTE — PROCEDURE
[de-identified] : US guided Injection: Right shoulder (Subacromial). Indication: Pain A discussion was had with the patient regarding this procedure and all questions were answered. All risks, benefits and alternatives were discussed. These included but were not limited to bleeding, infection, and allergic reaction. Alcohol was used to clean the skin, and betadine was used to sterilize and prep the area in the posterior aspect of the right shoulder. Ethyl chloride spray was then used as a topical anesthetic. A 22-gauge needle was used to inject 3cc 1% xylocaine, 2cc 0.25% bupivacaine and 1cc of 40mg/mL triamcinolone acetonide into the right subacromial space. An ultrasound guidance was used for adequate placement of needle. A sterile bandage was then applied. The patient tolerated the procedure well and there were no complications.

## 2024-02-29 NOTE — HISTORY OF PRESENT ILLNESS
[de-identified] : Patient is a 68-year-old female right-hand-dominant here today for evaluation of "heaviness" in both of her shoulders. She continues to have limitations with her ROM and pain. She has a history of brain cancer with removal of her pituitary gland.  Since that time she has felt weak.  She reports decreasing activities including lifting her granddaughter.  She does not have any specific pain in both shoulders.  She has a history of a left shoulder arthroscopy with debridement and decompression by Dr. Chow several years ago.  She is not taking any medications for this.

## 2024-02-29 NOTE — PHYSICAL EXAM
[de-identified] : Physical Exam: \par  General: Well appearing, no acute distress \par  Neurologic: A&Ox3, No focal deficits \par  Head: NCAT without abrasions, lacerations, or ecchymosis to head, face, or scalp \par  Eyes: No scleral icterus, no gross abnormalities \par  Respiratory: Equal chest wall expansion bilaterally, no accessory muscle use \par  Lymphatic: No lymphadenopathy palpated \par  Skin: Warm and dry \par  Psychiatric: Normal mood and affect\par  \par  Examination of the Left shoulder shows no obvious deformity, swelling or erythema. Mild tenderness to palpation over the anterior shoulder. No AC joint tenderness. The patient demonstrates active/passive ROM of Forward Flexion to 150 degrees, External Rotation to 70 degrees and Internal Rotation to a mid lumbar level. The patient has a positive Ordonez and Neers test. No pain with cross body adduction, lift off testing, AC compression testing or Yergason testing. The patient has 4/5 strength to forward flexion with pronation, internal and external rotation. Compartments are soft and nontender. The patient has 2+ cap refill and sensation is intact in the hand. \par  \par  Examination of the Right shoulder shows no obvious deformity, swelling or erythema. Mild tenderness to palpation over the anterior shoulder. No AC joint tenderness. The patient demonstrates active/passive ROM of Forward Flexion to 150 degrees, External Rotation to 80 degrees and Internal Rotation to a mid lumbar level. The patient has a positive Ordonez and Neers test. No pain with cross body adduction, lift off testing, AC compression testing or Yergason testing. The patient has 4/5 strength to forward flexion with pronation, internal and external rotation. Compartments are soft and nontender. The patient has 2+ cap refill and sensation is intact in the hand. \par   [de-identified] : No new imaging today.

## 2024-03-15 NOTE — PHYSICAL THERAPY INITIAL EVALUATION ADULT - WORK/LEISURE ACTIVITY, REHAB EVAL
Pt states that her port is not working , pt is worried about a blood infection . Pt states that she has been having blood nose 5 times a dayand a few other things . Please give pt a call .    independent

## 2024-04-11 ENCOUNTER — APPOINTMENT (OUTPATIENT)
Dept: ORTHOPEDIC SURGERY | Facility: CLINIC | Age: 69
End: 2024-04-11
Payer: MEDICARE

## 2024-04-11 VITALS — HEIGHT: 66 IN | BODY MASS INDEX: 28.93 KG/M2 | WEIGHT: 180 LBS

## 2024-04-11 DIAGNOSIS — M75.81 OTHER SHOULDER LESIONS, RIGHT SHOULDER: ICD-10-CM

## 2024-04-11 PROCEDURE — 99214 OFFICE O/P EST MOD 30 MIN: CPT

## 2024-04-11 NOTE — HISTORY OF PRESENT ILLNESS
[de-identified] : Patient is a 68-year-old female right-hand-dominant here today for evaluation of "heaviness" in both of her shoulders. She continues to have limitations with her ROM and pain. She had a cortisone injection into her right shoulder at the previous visit about 5 weeks ago that did not improve symptoms long-term. She has a history of brain cancer with removal of her pituitary gland. She has a history of a left shoulder arthroscopy with debridement and decompression by Dr. Chow several years ago.

## 2024-04-11 NOTE — DISCUSSION/SUMMARY
[de-identified] : We had a thorough discussion regarding the nature of her pain, the pathophysiology, as well as all treatment options. We discussed Zilretta as an option. Considering the patient's current presentation of pain, physical exam, and radiographs an MRI is indicated at this time for the right shoulder. A prescription for this was given to the patient. We will go over the MRI results with her upon obtaining the results in the office and advise her of further treatment options. She agrees with the above plan and all questions were answered.

## 2024-04-11 NOTE — ADDENDUM
[FreeTextEntry1] : Documented by Paula Basilio acting as a scribe for Dr. Sullivan on 04/11/2024. All medical record entries made by the Scribe were at my, Dr. Sullivan's, direction and personally dictated by me on 04/11/2024. I have reviewed the chart and agree that the record accurately reflects my personal performance of the history, physical exam, procedure and imaging.

## 2024-04-11 NOTE — PHYSICAL EXAM
[de-identified] : Physical Exam: \par  General: Well appearing, no acute distress \par  Neurologic: A&Ox3, No focal deficits \par  Head: NCAT without abrasions, lacerations, or ecchymosis to head, face, or scalp \par  Eyes: No scleral icterus, no gross abnormalities \par  Respiratory: Equal chest wall expansion bilaterally, no accessory muscle use \par  Lymphatic: No lymphadenopathy palpated \par  Skin: Warm and dry \par  Psychiatric: Normal mood and affect\par  \par  Examination of the Left shoulder shows no obvious deformity, swelling or erythema. Mild tenderness to palpation over the anterior shoulder. No AC joint tenderness. The patient demonstrates active/passive ROM of Forward Flexion to 150 degrees, External Rotation to 70 degrees and Internal Rotation to a mid lumbar level. The patient has a positive Ordonez and Neers test. No pain with cross body adduction, lift off testing, AC compression testing or Yergason testing. The patient has 4/5 strength to forward flexion with pronation, internal and external rotation. Compartments are soft and nontender. The patient has 2+ cap refill and sensation is intact in the hand. \par  \par  Examination of the Right shoulder shows no obvious deformity, swelling or erythema. Mild tenderness to palpation over the anterior shoulder. No AC joint tenderness. The patient demonstrates active/passive ROM of Forward Flexion to 150 degrees, External Rotation to 80 degrees and Internal Rotation to a mid lumbar level. The patient has a positive Ordonez and Neers test. No pain with cross body adduction, lift off testing, AC compression testing or Yergason testing. The patient has 4/5 strength to forward flexion with pronation, internal and external rotation. Compartments are soft and nontender. The patient has 2+ cap refill and sensation is intact in the hand. \par   [de-identified] : No new imaging today.

## 2024-04-15 ENCOUNTER — OUTPATIENT (OUTPATIENT)
Dept: OUTPATIENT SERVICES | Facility: HOSPITAL | Age: 69
LOS: 1 days | End: 2024-04-15
Payer: MEDICARE

## 2024-04-15 ENCOUNTER — APPOINTMENT (OUTPATIENT)
Dept: MRI IMAGING | Facility: CLINIC | Age: 69
End: 2024-04-15

## 2024-04-15 DIAGNOSIS — Z90.49 ACQUIRED ABSENCE OF OTHER SPECIFIED PARTS OF DIGESTIVE TRACT: Chronic | ICD-10-CM

## 2024-04-15 DIAGNOSIS — Z98.890 OTHER SPECIFIED POSTPROCEDURAL STATES: Chronic | ICD-10-CM

## 2024-04-15 DIAGNOSIS — M75.81 OTHER SHOULDER LESIONS, RIGHT SHOULDER: ICD-10-CM

## 2024-04-15 DIAGNOSIS — D35.2 BENIGN NEOPLASM OF PITUITARY GLAND: Chronic | ICD-10-CM

## 2024-04-15 DIAGNOSIS — Z90.710 ACQUIRED ABSENCE OF BOTH CERVIX AND UTERUS: Chronic | ICD-10-CM

## 2024-04-15 DIAGNOSIS — Z96.651 PRESENCE OF RIGHT ARTIFICIAL KNEE JOINT: Chronic | ICD-10-CM

## 2024-04-15 DIAGNOSIS — M19.011 PRIMARY OSTEOARTHRITIS, RIGHT SHOULDER: ICD-10-CM

## 2024-04-15 PROCEDURE — 73221 MRI JOINT UPR EXTREM W/O DYE: CPT | Mod: 26,RT

## 2024-04-18 ENCOUNTER — APPOINTMENT (OUTPATIENT)
Dept: ORTHOPEDIC SURGERY | Facility: CLINIC | Age: 69
End: 2024-04-18
Payer: MEDICARE

## 2024-04-18 VITALS
SYSTOLIC BLOOD PRESSURE: 113 MMHG | DIASTOLIC BLOOD PRESSURE: 76 MMHG | BODY MASS INDEX: 28.93 KG/M2 | WEIGHT: 180 LBS | HEIGHT: 66 IN | HEART RATE: 108 BPM

## 2024-04-18 PROCEDURE — 99214 OFFICE O/P EST MOD 30 MIN: CPT

## 2024-04-18 NOTE — HISTORY OF PRESENT ILLNESS
[de-identified] : Patient is a 68-year-old female right-hand-dominant here today for evaluation of "heaviness" in both of her shoulders. She is here today for an MRI review.

## 2024-04-18 NOTE — PHYSICAL EXAM
[de-identified] : Physical Exam: \par  General: Well appearing, no acute distress \par  Neurologic: A&Ox3, No focal deficits \par  Head: NCAT without abrasions, lacerations, or ecchymosis to head, face, or scalp \par  Eyes: No scleral icterus, no gross abnormalities \par  Respiratory: Equal chest wall expansion bilaterally, no accessory muscle use \par  Lymphatic: No lymphadenopathy palpated \par  Skin: Warm and dry \par  Psychiatric: Normal mood and affect\par  \par  Examination of the Left shoulder shows no obvious deformity, swelling or erythema. Mild tenderness to palpation over the anterior shoulder. No AC joint tenderness. The patient demonstrates active/passive ROM of Forward Flexion to 150 degrees, External Rotation to 70 degrees and Internal Rotation to a mid lumbar level. The patient has a positive Ordonez and Neers test. No pain with cross body adduction, lift off testing, AC compression testing or Yergason testing. The patient has 4/5 strength to forward flexion with pronation, internal and external rotation. Compartments are soft and nontender. The patient has 2+ cap refill and sensation is intact in the hand. \par  \par  Examination of the Right shoulder shows no obvious deformity, swelling or erythema. Mild tenderness to palpation over the anterior shoulder. No AC joint tenderness. The patient demonstrates active/passive ROM of Forward Flexion to 150 degrees, External Rotation to 80 degrees and Internal Rotation to a mid lumbar level. The patient has a positive Ordonez and Neers test. No pain with cross body adduction, lift off testing, AC compression testing or Yergason testing. The patient has 4/5 strength to forward flexion with pronation, internal and external rotation. Compartments are soft and nontender. The patient has 2+ cap refill and sensation is intact in the hand. \par   [de-identified] : MRI [facility]  EXAM: [id number] - MR SHOULDER RT - ORDERED BY: RAMONA FLYNN  PROCEDURE DATE: 04/15/2024  IMPRESSION: 1. Mild to moderate supraspinatus tendinosis with mild to moderate articular surface tearing involving the anterior insertional fibers, slightly progressed. Mild infraspinatus tendinosis without discrete tear, unchanged. The subscapularis and teres minor tendinous insertions are intact. 2. Again seen is thickening of the inferior glenohumeral ligament with improvement of the adjacent edema. This could be seen with adhesive capsulitis versus underdistention of the capsule. Clinical correlation is advised. 3. Mild/moderate intra-articular tendinosis of the long head of the biceps tendon, progressed. 4. Mild subacromial/subdeltoid bursitis.

## 2024-04-18 NOTE — DISCUSSION/SUMMARY
[de-identified] : We had a thorough discussion regarding the nature of her pain, the pathophysiology, as well as all treatment options. I discussed operative and non-operative treatment modalities. All risks, benefits and alternatives to the proposed surgical procedure, right shoulder arthroscopy with possible RTC repair and biceps tenodesis, were discussed in great detail with the patient. Risks include but are not limited to pain, bleeding, infection, stiffness, medical complications (including DVT, PE, MI), and risks of anesthesia. The patient expressed understanding and all questions were answered. The patient is electing to proceed, and I will have the patient scheduled accordingly. I will have the patient speak with my surgical coordinator Liat, who will go over dates for this procedure. All questions were answered.

## 2024-04-18 NOTE — ADDENDUM
[FreeTextEntry1] : Documented by Paula Basilio acting as a scribe for Dr. Sullivan on 04/18/2024. All medical record entries made by the Scribe were at my, Dr. Sullivan's, direction and personally dictated by me on 04/18/2024. I have reviewed the chart and agree that the record accurately reflects my personal performance of the history, physical exam, procedure and imaging.

## 2024-04-23 ENCOUNTER — NON-APPOINTMENT (OUTPATIENT)
Age: 69
End: 2024-04-23

## 2024-05-01 ENCOUNTER — OUTPATIENT (OUTPATIENT)
Dept: OUTPATIENT SERVICES | Facility: HOSPITAL | Age: 69
LOS: 1 days | End: 2024-05-01
Payer: MEDICARE

## 2024-05-01 VITALS
WEIGHT: 188.5 LBS | SYSTOLIC BLOOD PRESSURE: 113 MMHG | RESPIRATION RATE: 16 BRPM | DIASTOLIC BLOOD PRESSURE: 74 MMHG | TEMPERATURE: 98 F | OXYGEN SATURATION: 99 % | HEART RATE: 85 BPM | HEIGHT: 66 IN

## 2024-05-01 DIAGNOSIS — Z90.710 ACQUIRED ABSENCE OF BOTH CERVIX AND UTERUS: Chronic | ICD-10-CM

## 2024-05-01 DIAGNOSIS — Z90.49 ACQUIRED ABSENCE OF OTHER SPECIFIED PARTS OF DIGESTIVE TRACT: Chronic | ICD-10-CM

## 2024-05-01 DIAGNOSIS — D35.2 BENIGN NEOPLASM OF PITUITARY GLAND: Chronic | ICD-10-CM

## 2024-05-01 DIAGNOSIS — Z98.890 OTHER SPECIFIED POSTPROCEDURAL STATES: Chronic | ICD-10-CM

## 2024-05-01 DIAGNOSIS — Z01.818 ENCOUNTER FOR OTHER PREPROCEDURAL EXAMINATION: ICD-10-CM

## 2024-05-01 DIAGNOSIS — Z96.651 PRESENCE OF RIGHT ARTIFICIAL KNEE JOINT: Chronic | ICD-10-CM

## 2024-05-01 DIAGNOSIS — L90.5 SCAR CONDITIONS AND FIBROSIS OF SKIN: Chronic | ICD-10-CM

## 2024-05-01 DIAGNOSIS — Z96.652 PRESENCE OF LEFT ARTIFICIAL KNEE JOINT: Chronic | ICD-10-CM

## 2024-05-01 LAB
ANION GAP SERPL CALC-SCNC: 1 MMOL/L — LOW (ref 5–17)
APPEARANCE UR: CLEAR — SIGNIFICANT CHANGE UP
APTT BLD: 30.7 SEC — SIGNIFICANT CHANGE UP (ref 24.5–35.6)
BACTERIA # UR AUTO: NEGATIVE /HPF — SIGNIFICANT CHANGE UP
BASOPHILS # BLD AUTO: 0.07 K/UL — SIGNIFICANT CHANGE UP (ref 0–0.2)
BASOPHILS NFR BLD AUTO: 0.7 % — SIGNIFICANT CHANGE UP (ref 0–2)
BILIRUB UR-MCNC: NEGATIVE — SIGNIFICANT CHANGE UP
BUN SERPL-MCNC: 15 MG/DL — SIGNIFICANT CHANGE UP (ref 7–23)
CALCIUM SERPL-MCNC: 9.8 MG/DL — SIGNIFICANT CHANGE UP (ref 8.5–10.1)
CAST: 1 /LPF — SIGNIFICANT CHANGE UP (ref 0–4)
CHLORIDE SERPL-SCNC: 109 MMOL/L — HIGH (ref 96–108)
CO2 SERPL-SCNC: 29 MMOL/L — SIGNIFICANT CHANGE UP (ref 22–31)
COLOR SPEC: YELLOW — SIGNIFICANT CHANGE UP
CREAT SERPL-MCNC: 0.72 MG/DL — SIGNIFICANT CHANGE UP (ref 0.5–1.3)
DIFF PNL FLD: NEGATIVE — SIGNIFICANT CHANGE UP
EGFR: 92 ML/MIN/1.73M2 — SIGNIFICANT CHANGE UP
EOSINOPHIL # BLD AUTO: 0.15 K/UL — SIGNIFICANT CHANGE UP (ref 0–0.5)
EOSINOPHIL NFR BLD AUTO: 1.5 % — SIGNIFICANT CHANGE UP (ref 0–6)
GLUCOSE SERPL-MCNC: 85 MG/DL — SIGNIFICANT CHANGE UP (ref 70–99)
GLUCOSE UR QL: NEGATIVE MG/DL — SIGNIFICANT CHANGE UP
HCT VFR BLD CALC: 42 % — SIGNIFICANT CHANGE UP (ref 34.5–45)
HGB BLD-MCNC: 13.2 G/DL — SIGNIFICANT CHANGE UP (ref 11.5–15.5)
IMM GRANULOCYTES NFR BLD AUTO: 0.3 % — SIGNIFICANT CHANGE UP (ref 0–0.9)
INR BLD: 0.96 RATIO — SIGNIFICANT CHANGE UP (ref 0.85–1.18)
KETONES UR-MCNC: NEGATIVE MG/DL — SIGNIFICANT CHANGE UP
LEUKOCYTE ESTERASE UR-ACNC: ABNORMAL
LYMPHOCYTES # BLD AUTO: 3.67 K/UL — HIGH (ref 1–3.3)
LYMPHOCYTES # BLD AUTO: 36.9 % — SIGNIFICANT CHANGE UP (ref 13–44)
MCHC RBC-ENTMCNC: 29.7 PG — SIGNIFICANT CHANGE UP (ref 27–34)
MCHC RBC-ENTMCNC: 31.4 GM/DL — LOW (ref 32–36)
MCV RBC AUTO: 94.4 FL — SIGNIFICANT CHANGE UP (ref 80–100)
MONOCYTES # BLD AUTO: 0.76 K/UL — SIGNIFICANT CHANGE UP (ref 0–0.9)
MONOCYTES NFR BLD AUTO: 7.6 % — SIGNIFICANT CHANGE UP (ref 2–14)
NEUTROPHILS # BLD AUTO: 5.27 K/UL — SIGNIFICANT CHANGE UP (ref 1.8–7.4)
NEUTROPHILS NFR BLD AUTO: 53 % — SIGNIFICANT CHANGE UP (ref 43–77)
NITRITE UR-MCNC: NEGATIVE — SIGNIFICANT CHANGE UP
PH UR: 5.5 — SIGNIFICANT CHANGE UP (ref 5–8)
PLATELET # BLD AUTO: 355 K/UL — SIGNIFICANT CHANGE UP (ref 150–400)
POTASSIUM SERPL-MCNC: 4 MMOL/L — SIGNIFICANT CHANGE UP (ref 3.5–5.3)
POTASSIUM SERPL-SCNC: 4 MMOL/L — SIGNIFICANT CHANGE UP (ref 3.5–5.3)
PROT UR-MCNC: NEGATIVE MG/DL — SIGNIFICANT CHANGE UP
PROTHROM AB SERPL-ACNC: 10.9 SEC — SIGNIFICANT CHANGE UP (ref 9.5–13)
RBC # BLD: 4.45 M/UL — SIGNIFICANT CHANGE UP (ref 3.8–5.2)
RBC # FLD: 13.5 % — SIGNIFICANT CHANGE UP (ref 10.3–14.5)
RBC CASTS # UR COMP ASSIST: 0 /HPF — SIGNIFICANT CHANGE UP (ref 0–4)
SODIUM SERPL-SCNC: 139 MMOL/L — SIGNIFICANT CHANGE UP (ref 135–145)
SP GR SPEC: 1.02 — SIGNIFICANT CHANGE UP (ref 1–1.03)
SQUAMOUS # UR AUTO: 9 /HPF — HIGH (ref 0–5)
UROBILINOGEN FLD QL: 0.2 MG/DL — SIGNIFICANT CHANGE UP (ref 0.2–1)
WBC # BLD: 9.95 K/UL — SIGNIFICANT CHANGE UP (ref 3.8–10.5)
WBC # FLD AUTO: 9.95 K/UL — SIGNIFICANT CHANGE UP (ref 3.8–10.5)
WBC UR QL: 7 /HPF — HIGH (ref 0–5)

## 2024-05-01 PROCEDURE — 85610 PROTHROMBIN TIME: CPT

## 2024-05-01 PROCEDURE — 99214 OFFICE O/P EST MOD 30 MIN: CPT | Mod: 25

## 2024-05-01 PROCEDURE — 93005 ELECTROCARDIOGRAM TRACING: CPT

## 2024-05-01 PROCEDURE — 93010 ELECTROCARDIOGRAM REPORT: CPT

## 2024-05-01 PROCEDURE — 36415 COLL VENOUS BLD VENIPUNCTURE: CPT

## 2024-05-01 PROCEDURE — 80048 BASIC METABOLIC PNL TOTAL CA: CPT

## 2024-05-01 PROCEDURE — 85730 THROMBOPLASTIN TIME PARTIAL: CPT

## 2024-05-01 PROCEDURE — 85025 COMPLETE CBC W/AUTO DIFF WBC: CPT

## 2024-05-01 PROCEDURE — 81001 URINALYSIS AUTO W/SCOPE: CPT

## 2024-05-01 RX ORDER — NIFEDIPINE 30 MG
1 TABLET, EXTENDED RELEASE 24 HR ORAL
Refills: 0 | DISCHARGE

## 2024-05-01 RX ORDER — TROSPIUM CHLORIDE 20 MG/1
1 TABLET, FILM COATED ORAL
Refills: 0 | DISCHARGE

## 2024-05-01 RX ORDER — LOSARTAN/HYDROCHLOROTHIAZIDE 100MG-25MG
1 TABLET ORAL
Qty: 0 | Refills: 0 | DISCHARGE

## 2024-05-01 RX ORDER — LOSARTAN POTASSIUM 100 MG/1
1 TABLET, FILM COATED ORAL
Refills: 0 | DISCHARGE

## 2024-05-01 RX ORDER — MONTELUKAST 4 MG/1
1 TABLET, CHEWABLE ORAL
Qty: 0 | Refills: 0 | DISCHARGE

## 2024-05-01 RX ORDER — OMEPRAZOLE 10 MG/1
1 CAPSULE, DELAYED RELEASE ORAL
Refills: 0 | DISCHARGE

## 2024-05-01 RX ORDER — MONTELUKAST 4 MG/1
1 TABLET, CHEWABLE ORAL
Refills: 0 | DISCHARGE

## 2024-05-01 RX ORDER — TROSPIUM CHLORIDE 20 MG/1
1 TABLET, FILM COATED ORAL
Qty: 0 | Refills: 0 | DISCHARGE

## 2024-05-01 RX ORDER — ALBUTEROL 90 UG/1
0 AEROSOL, METERED ORAL
Qty: 0 | Refills: 0 | DISCHARGE

## 2024-05-01 RX ORDER — FLUTICASONE PROPIONATE 220 MCG
0 AEROSOL WITH ADAPTER (GRAM) INHALATION
Qty: 0 | Refills: 0 | DISCHARGE

## 2024-05-01 RX ORDER — OMEPRAZOLE 10 MG/1
1 CAPSULE, DELAYED RELEASE ORAL
Qty: 0 | Refills: 0 | DISCHARGE

## 2024-05-01 RX ORDER — FLUTICASONE FUROATE AND VILANTEROL TRIFENATATE 100; 25 UG/1; UG/1
1 POWDER RESPIRATORY (INHALATION)
Refills: 0 | DISCHARGE

## 2024-05-01 RX ORDER — FLUTICASONE PROPIONATE AND SALMETEROL 50; 250 UG/1; UG/1
1 POWDER ORAL; RESPIRATORY (INHALATION)
Qty: 0 | Refills: 0 | DISCHARGE

## 2024-05-01 RX ORDER — PREDNISOLONE SODIUM PHOSPHATE 1 %
1 DROPS OPHTHALMIC (EYE)
Refills: 0 | DISCHARGE

## 2024-05-01 RX ORDER — LEVOTHYROXINE SODIUM 125 MCG
1 TABLET ORAL
Qty: 0 | Refills: 0 | DISCHARGE

## 2024-05-01 RX ORDER — METHIMAZOLE 10 MG/1
0.5 TABLET ORAL
Refills: 0 | DISCHARGE

## 2024-05-01 NOTE — H&P PST ADULT - MUSCULOSKELETAL
Impression: Other vitreous opacities, bilateral: H43.393. Plan: Discussed signs and symptoms of PVD/floaters. Patient instructed to call the office immediately if any symptoms noted. normal gait details…

## 2024-05-01 NOTE — H&P PST ADULT - NSICDXPASTSURGICALHX_GEN_ALL_CORE_FT
PAST SURGICAL HISTORY:  Admission for repair of scarred tissue     H/O total knee replacement, left     H/O total knee replacement, right     Pituitary adenoma s/p removal 9/21    S/P arthroscopy of shoulder     S/P cholecystectomy     S/P dilatation and curettage     S/P right knee arthroscopy     S/P tendon repair R hand    S/P total hysterectomy

## 2024-05-01 NOTE — H&P PST ADULT - NSICDXPASTMEDICALHX_GEN_ALL_CORE_FT
PAST MEDICAL HISTORY:  Acute pain left shoulder    Asthma     Endometrial cancer     GERD (gastroesophageal reflux disease)     History of cataract surgery     History of hyperthyroidism     Hypertension     IBS (irritable bowel syndrome)     Overactive bladder     Pituitary adenoma     Raynaud disease     Risk factors for obstructive sleep apnea     Unilateral primary osteoarthritis, left knee     Uterine polyp

## 2024-05-01 NOTE — H&P PST ADULT - ASSESSMENT
Plan:  1. PST instructions given ; NPO status/  instructions to be given by ASU   2. Pt instructed to take following meds on day of surgery:   3. Pt instructed to take routine evening medications unless indicated   4. Stop NSAIDS ( Aspirin Alev Motrin Mobic Diclofenac), herbal supplements , MVI , Vitamin fish oil 7 days prior to surgery  unless   directed by surgeon or cardiologist;   5. Medical Optimization  with    6. EZ wash instructions given & mupirocin instructions given  7. Labs EKG CXR as per surgeon request   8. Pt denies covid symptoms shortness of breath fever cough        69 year old female with pre-op diagnosis of right incomplete rotator cuff tear, capsulitis, tendinitis right shoulder; c/o right shoulder pain and weakness; Pt said she is unable to lift her granddaughter; Has had cortisone injection in the past; she presents to PST for planned right shoulder arthroscopy       Plan:  1. PST instructions given ; NPO status/  instructions to be given by ASU   2. Pt instructed to take following meds on day of surgery: losartan   3. Pt instructed to take routine evening medications unless indicated   4. Stop NSAIDS ( Aspirin Alev Motrin Mobic Diclofenac), herbal supplements , MVI , Vitamin fish oil 7 days prior to surgery  unless   directed by surgeon or cardiologist;   5. Medical Optimization  with Dr Varela   6. EZ wash instructions given   7. Labs EKG  as per surgeon request   8. Pt denies covid symptoms shortness of breath fever cough

## 2024-05-01 NOTE — H&P PST ADULT - HISTORY OF PRESENT ILLNESS
66 year old female with a pmhx of asthma last exacerbation 1 year ago, last used rescue inhaler months ago, hypothyrodism, OA, endometrial cancer s/p hysterectomy, recently had pituitary adenoma removed by Dr Kumar at [facility] 9/21. Patient presents to PST today with complaints of left knee pain since Spring, pain is getting progressively worse, describes the pain as sharp, 9/10 in severity cannot walk, worse with sitting from standing, reports stiffness, cannot go up and down stairs, holding on to furniture to walk around her house, has tried cortisone injections without relief, taking diclofenac PO. MRI of the knee revealed complete radial tear of the posterior horn of the medial meniscus, broad-based full-thickness chondral loss involving the majority of the central weightbearing portion of the medial femoral condyle medial tibial plateau, subchondral fracture with associated marrow edema within the outer aspect of the medial femoral condyle, mild to moderate medial patellofemoral arthrosis.  Patient is scheduled for left knee total joint replacement with Dr De La Vega on 11/24/21.  Medical and neurosurg clearance pending     69 year old female with pre-op diagnosis of right incomplete rotator cuff tear, capsulitis, tendinitis right shoulder; c/o right shoulder pain and weakness; Pt said she is unable to lift her granddaughter; Has had cortisone injection in the past; she presents to PST for planned right shoulder arthroscopy

## 2024-05-02 DIAGNOSIS — Z01.818 ENCOUNTER FOR OTHER PREPROCEDURAL EXAMINATION: ICD-10-CM

## 2024-05-08 ENCOUNTER — APPOINTMENT (OUTPATIENT)
Dept: ORTHOPEDIC SURGERY | Facility: HOSPITAL | Age: 69
End: 2024-05-08

## 2024-05-08 ENCOUNTER — OUTPATIENT (OUTPATIENT)
Dept: INPATIENT UNIT | Facility: HOSPITAL | Age: 69
LOS: 1 days | Discharge: HOME | End: 2024-05-08
Payer: MEDICARE

## 2024-05-08 ENCOUNTER — TRANSCRIPTION ENCOUNTER (OUTPATIENT)
Age: 69
End: 2024-05-08

## 2024-05-08 VITALS
HEART RATE: 86 BPM | WEIGHT: 188.5 LBS | OXYGEN SATURATION: 97 % | DIASTOLIC BLOOD PRESSURE: 70 MMHG | SYSTOLIC BLOOD PRESSURE: 111 MMHG | RESPIRATION RATE: 16 BRPM | HEIGHT: 66 IN | TEMPERATURE: 98 F

## 2024-05-08 VITALS
TEMPERATURE: 98 F | HEART RATE: 91 BPM | DIASTOLIC BLOOD PRESSURE: 67 MMHG | OXYGEN SATURATION: 96 % | SYSTOLIC BLOOD PRESSURE: 110 MMHG | RESPIRATION RATE: 16 BRPM

## 2024-05-08 DIAGNOSIS — Z90.710 ACQUIRED ABSENCE OF BOTH CERVIX AND UTERUS: Chronic | ICD-10-CM

## 2024-05-08 DIAGNOSIS — M75.01 ADHESIVE CAPSULITIS OF RIGHT SHOULDER: ICD-10-CM

## 2024-05-08 DIAGNOSIS — Z98.890 OTHER SPECIFIED POSTPROCEDURAL STATES: Chronic | ICD-10-CM

## 2024-05-08 DIAGNOSIS — Z96.651 PRESENCE OF RIGHT ARTIFICIAL KNEE JOINT: Chronic | ICD-10-CM

## 2024-05-08 DIAGNOSIS — Z90.49 ACQUIRED ABSENCE OF OTHER SPECIFIED PARTS OF DIGESTIVE TRACT: Chronic | ICD-10-CM

## 2024-05-08 DIAGNOSIS — D35.2 BENIGN NEOPLASM OF PITUITARY GLAND: Chronic | ICD-10-CM

## 2024-05-08 DIAGNOSIS — Z96.652 PRESENCE OF LEFT ARTIFICIAL KNEE JOINT: Chronic | ICD-10-CM

## 2024-05-08 DIAGNOSIS — S46.011D STRAIN OF MUSCLE(S) AND TENDON(S) OF THE ROTATOR CUFF OF RIGHT SHOULDER, SUBSEQUENT ENCOUNTER: ICD-10-CM

## 2024-05-08 DIAGNOSIS — L90.5 SCAR CONDITIONS AND FIBROSIS OF SKIN: Chronic | ICD-10-CM

## 2024-05-08 PROCEDURE — 29827 SHO ARTHRS SRG RT8TR CUF RPR: CPT | Mod: RT

## 2024-05-08 PROCEDURE — C1713: CPT

## 2024-05-08 PROCEDURE — 29826 SHO ARTHRS SRG DECOMPRESSION: CPT | Mod: RT

## 2024-05-08 PROCEDURE — C9399: CPT

## 2024-05-08 RX ORDER — SODIUM CHLORIDE 9 MG/ML
1000 INJECTION, SOLUTION INTRAVENOUS
Refills: 0 | Status: DISCONTINUED | OUTPATIENT
Start: 2024-05-08 | End: 2024-05-08

## 2024-05-08 RX ORDER — OXYCODONE HYDROCHLORIDE 5 MG/1
1 TABLET ORAL
Qty: 28 | Refills: 0
Start: 2024-05-08 | End: 2024-05-14

## 2024-05-08 RX ORDER — APREPITANT 80 MG/1
40 CAPSULE ORAL ONCE
Refills: 0 | Status: COMPLETED | OUTPATIENT
Start: 2024-05-08 | End: 2024-05-08

## 2024-05-08 RX ORDER — DOCUSATE SODIUM 100 MG
1 CAPSULE ORAL
Qty: 21 | Refills: 0
Start: 2024-05-08 | End: 2024-05-14

## 2024-05-08 RX ORDER — ONDANSETRON 8 MG/1
4 TABLET, FILM COATED ORAL EVERY 6 HOURS
Refills: 0 | Status: DISCONTINUED | OUTPATIENT
Start: 2024-05-08 | End: 2024-05-08

## 2024-05-08 RX ORDER — OXYCODONE HYDROCHLORIDE 5 MG/1
5 TABLET ORAL ONCE
Refills: 0 | Status: DISCONTINUED | OUTPATIENT
Start: 2024-05-08 | End: 2024-05-08

## 2024-05-08 RX ORDER — FENTANYL CITRATE 50 UG/ML
50 INJECTION INTRAVENOUS
Refills: 0 | Status: DISCONTINUED | OUTPATIENT
Start: 2024-05-08 | End: 2024-05-08

## 2024-05-08 RX ORDER — ONDANSETRON 8 MG/1
1 TABLET, FILM COATED ORAL
Qty: 10 | Refills: 0
Start: 2024-05-08 | End: 2024-05-11

## 2024-05-08 RX ADMIN — APREPITANT 40 MILLIGRAM(S): 80 CAPSULE ORAL at 12:26

## 2024-05-08 NOTE — ASU PATIENT PROFILE, ADULT - AS SC BRADEN ACTIVITY
Medication(s) Requested: tramadol  Last office visit: 6/12/17  Last refill: 4/16  Is the patient due for refill of this medication(s): Yes  PDMP review: Criteria met. Prescription approved by Physician/LEONARDO and  Called to pharmacy. Voice message left with patient    
Patient would like to refill Tramadol, 4 left    Pharmacy  [facility] in [place]  
(4) walks frequently

## 2024-05-08 NOTE — ASU DISCHARGE PLAN (ADULT/PEDIATRIC) - ASU DC SPECIAL INSTRUCTIONSFT
POST-OPERATIVE INSTRUCTION SHEET: Right Shoulder Arthroscopy      MEDICATIONS: You will be given a prescription for pain medication prior to discharge. This medication may be taken as directed for breakthrough pain. You should try taking Extra Strength Tylenol first (500 mg every 4 hours) which is available over-the-counter. Be sure not to exceed 3,000 mg in 24 hours. We ask that you avoid NSAIDs (Advil, Motrin, Aleve, ibuprofen, etc.) for the first few weeks after your surgery as these may interrupt tendon healing. You will also be given a prescription for antinausea medication, Zofran.    You should take a stool softener while you are taking narcotics. The pain medication can cause significant constipation. Miralax or Senna can be purchased over the counter and is taken twice daily.     ICE: An ice device or an ice bag (not directly touching the skin) should be utilized to reduce swelling and pain. Please ice every 3-4 hours for about 15-20 minutes each time for at least the first 5 days or until swelling subsides. We have Polar Ice devices in our office available for purchase and our staff would be happy to assist you with this. Although this is helpful, it is not mandatory and available to you only if you would like one.      SLING: Non-weight Bearing Right Upper Extremity with Sling. You will be given a sling, in some cases with an abduction pillow. This should be worn for at least 4-6 weeks unless directed by Dr. Sullivan or his PA. The sling may be removed for hygiene and for exercises. You should sleep with the sling on.     EXERCISES: Pendulum exercises to be performed for 3-5 minutes every 1-2 hours. When lying in bed, elevate the head of your bed. Move your fingers, hand and elbow to increase circulation. DO NOT lift your arm at the shoulder as to avoid destroying the repair, until it has healed (when Cleared by Dr. Sullivan)    PHYSICAL THERAPY: You will begin PT usually 2 weeks after surgery and a PT prescription and protocol will be given to you at your first post-operative appointment. Please plan to begin PT within a week of this appointment. You will schedule this on your own but we can assist you in finding a convenient facility.      WOUND CARE:  Leave your surgical dressing on for 3 days. After 3 days you may remove your dressing and shower. Dry the incisions well and apply a small dressing or Band-Aid over the incisions. Keeping these areas dry and clean is very important.     FOLLOW UP: If you do not already have a follow-up visit scheduled, please call [phone] to schedule one with Dr. Sullivan or his PA within 7-10 days for suture removal and to receive the PT prescription and protocol.

## 2024-05-08 NOTE — ASU DISCHARGE PLAN (ADULT/PEDIATRIC) - CARE PROVIDER_API CALL
Carmine Sullivan  Orthopaedic Surgery  [address], [address]  [place] [address]  Phone: [phone]  Fax: [phone]  Follow Up Time:

## 2024-05-08 NOTE — ASU PATIENT PROFILE, ADULT - FALL HARM RISK - UNIVERSAL INTERVENTIONS
Bed in lowest position, wheels locked, appropriate side rails in place/Call bell, personal items and telephone in reach/Instruct patient to call for assistance before getting out of bed or chair/Non-slip footwear when patient is out of bed/[place] to call system/Physically safe environment - no spills, clutter or unnecessary equipment/Purposeful Proactive Rounding/Room/bathroom lighting operational, light cord in reach

## 2024-05-08 NOTE — BRIEF OPERATIVE NOTE - NSICDXBRIEFPROCEDURE_GEN_ALL_CORE_FT
PROCEDURES:  Biceps tenodesis 08-May-2024 14:00:08 Right shoulder arthroscopy, biceps tenodesis, subacromial decompression, acromioplasty; Arthrex Arvin Dyer

## 2024-05-14 DIAGNOSIS — M75.101 UNSPECIFIED ROTATOR CUFF TEAR OR RUPTURE OF RIGHT SHOULDER, NOT SPECIFIED AS TRAUMATIC: ICD-10-CM

## 2024-05-14 DIAGNOSIS — M75.21 BICIPITAL TENDINITIS, RIGHT SHOULDER: ICD-10-CM

## 2024-05-14 DIAGNOSIS — Z90.710 ACQUIRED ABSENCE OF BOTH CERVIX AND UTERUS: ICD-10-CM

## 2024-05-14 DIAGNOSIS — K21.9 GASTRO-ESOPHAGEAL REFLUX DISEASE WITHOUT ESOPHAGITIS: ICD-10-CM

## 2024-05-14 DIAGNOSIS — N32.81 OVERACTIVE BLADDER: ICD-10-CM

## 2024-05-14 DIAGNOSIS — M75.01 ADHESIVE CAPSULITIS OF RIGHT SHOULDER: ICD-10-CM

## 2024-05-14 DIAGNOSIS — E05.90 THYROTOXICOSIS, UNSPECIFIED WITHOUT THYROTOXIC CRISIS OR STORM: ICD-10-CM

## 2024-05-14 DIAGNOSIS — J45.909 UNSPECIFIED ASTHMA, UNCOMPLICATED: ICD-10-CM

## 2024-05-14 DIAGNOSIS — Z96.653 PRESENCE OF ARTIFICIAL KNEE JOINT, BILATERAL: ICD-10-CM

## 2024-05-14 DIAGNOSIS — M75.41 IMPINGEMENT SYNDROME OF RIGHT SHOULDER: ICD-10-CM

## 2024-05-14 DIAGNOSIS — Z85.42 PERSONAL HISTORY OF MALIGNANT NEOPLASM OF OTHER PARTS OF UTERUS: ICD-10-CM

## 2024-05-14 DIAGNOSIS — M24.111 OTHER ARTICULAR CARTILAGE DISORDERS, RIGHT SHOULDER: ICD-10-CM

## 2024-05-14 DIAGNOSIS — I10 ESSENTIAL (PRIMARY) HYPERTENSION: ICD-10-CM

## 2024-05-15 ENCOUNTER — NON-APPOINTMENT (OUTPATIENT)
Age: 69
End: 2024-05-15

## 2024-05-20 ENCOUNTER — APPOINTMENT (OUTPATIENT)
Dept: ORTHOPEDIC SURGERY | Facility: CLINIC | Age: 69
End: 2024-05-20
Payer: MEDICARE

## 2024-05-20 DIAGNOSIS — S46.011D STRAIN OF MUSCLE(S) AND TENDON(S) OF THE ROTATOR CUFF OF RIGHT SHOULDER, SUBSEQUENT ENCOUNTER: ICD-10-CM

## 2024-05-20 PROCEDURE — 73030 X-RAY EXAM OF SHOULDER: CPT | Mod: RT

## 2024-05-20 PROCEDURE — 99024 POSTOP FOLLOW-UP VISIT: CPT

## 2024-05-20 NOTE — HISTORY OF PRESENT ILLNESS
[___ Weeks Post Op] : [unfilled] weeks post op [Xray (Date:___)] : [unfilled] Xray -  [Doing Well] : is doing well [No Sign of Infection] : is showing no signs of infection [Adequate Pain Control] : has adequate pain control [Sutures Removed] : sutures were removed [de-identified] : SPO Right shoulder arthroscopy, biceps tenodesis, subacromial decompression, acromioplasty DOS: 5/8/24 [de-identified] : Patient is here today for 1st post operative visit. SPO Right shoulder arthroscopy, biceps tenodesis, subacromial decompression, acromioplasty DOS: 5/8/24 She denies fever or chills, redness around or near the incision site(s), numbness/tingling. She denies nausea/ vomiting and admits to their appetite since their surgery being back to normal. Normal bowel habits at this time. Patient presents today wearing a shoulder sling. Patient since their surgery has utilized tylenol as their primary pain control with relief in their symptoms. They no longer require narcotics for pain control.  [de-identified] : Incisions are clean, dry and intact. No surrounding erythema. No drainage. Wound appears to be healing well. She has passive range of motion of ***  forward flexion, ER to ***  and IR to *** . Motor and sensation are intact distally. She has full range of motion of all fingers with capillary refill of <2 seconds throughout. She has good nerve function and median nerve, ulnar, radial, PIN, AIN. She has no sensory deficits over the C5-T1 nerve roots. Radial pulses 2+. Able to make an A-OK sign and thumbs up sign: able to flex/extend thumb, able to cross middle over index finger. Full ROM elbow, wrist, hand [de-identified] : 3V of the right shoulder were obtained today that show no dislocation. Hardware in good alignment. There is good healing appreciated. [de-identified] : I had a discussion with the patient regarding the operative and postoperative course. The patient is doing well. Patient will follow up in 4 wks for repeat clinical assessment. All questions were answered and the patient verbalized understanding. The patient is in agreement with this treatment plan.

## 2024-05-20 NOTE — ADDENDUM
[FreeTextEntry1] : Documented by Paula Basilio acting as a scribe for Dr. Sullivan on 05/20/2024. All medical record entries made by the Scribe were at my, Dr. Sullivan's, direction and personally dictated by me on 05/20/2024. I have reviewed the chart and agree that the record accurately reflects my personal performance of the history, physical exam, procedure and imaging.

## 2024-06-12 PROBLEM — Z86.39 PERSONAL HISTORY OF OTHER ENDOCRINE, NUTRITIONAL AND METABOLIC DISEASE: Chronic | Status: ACTIVE | Noted: 2024-05-01

## 2024-06-12 PROBLEM — Z98.49 CATARACT EXTRACTION STATUS, UNSPECIFIED EYE: Chronic | Status: ACTIVE | Noted: 2024-05-01

## 2024-06-12 PROBLEM — I73.00 RAYNAUD'S SYNDROME WITHOUT GANGRENE: Chronic | Status: ACTIVE | Noted: 2024-05-01

## 2024-06-19 ENCOUNTER — APPOINTMENT (OUTPATIENT)
Dept: ORTHOPEDIC SURGERY | Facility: CLINIC | Age: 69
End: 2024-06-19
Payer: MEDICARE

## 2024-06-19 DIAGNOSIS — M75.21 BICIPITAL TENDINITIS, RIGHT SHOULDER: ICD-10-CM

## 2024-06-19 DIAGNOSIS — M75.01 ADHESIVE CAPSULITIS OF RIGHT SHOULDER: ICD-10-CM

## 2024-06-19 PROCEDURE — 99024 POSTOP FOLLOW-UP VISIT: CPT

## 2024-06-19 NOTE — HISTORY OF PRESENT ILLNESS
[___ Weeks Post Op] : [unfilled] weeks post op [Xray (Date:___)] : [unfilled] Xray -  [Doing Well] : is doing well [No Sign of Infection] : is showing no signs of infection [Adequate Pain Control] : has adequate pain control [Sutures Removed] : sutures were removed [de-identified] : SPO Right shoulder arthroscopy, biceps tenodesis, subacromial decompression, acromioplasty DOS: 5/8/24 [de-identified] : Patient is here today for 2nd post operative visit. SPO Right shoulder arthroscopy, biceps tenodesis, subacromial decompression, acromioplasty DOS: 5/8/24 She denies fever or chills, redness around or near the incision site(s), numbness/tingling. She denies nausea/ vomiting and admits to their appetite since their surgery being back to normal. Normal bowel habits at this time. Patient presents today doing well.  She has minimal complaints.  She is performing PT.  She is leaving for 6 weeks of vacation. [de-identified] : Incisions are clean, dry and intact. No surrounding erythema. No drainage. Wound appears to be healing well. She has passive range of motion of 160 forward flexion, ER to 55  and IR to upper lumbar level. Motor and sensation are intact distally. She has full range of motion of all fingers with capillary refill of <2 seconds throughout. She has good nerve function and median nerve, ulnar, radial, PIN, AIN. She has no sensory deficits over the C5-T1 nerve roots. Radial pulses 2+. Able to make an A-OK sign and thumbs up sign: able to flex/extend thumb, able to cross middle over index finger. Full ROM elbow, wrist, hand [de-identified] : I had a discussion with the patient regarding the operative and postoperative course. The patient is doing well. Patient will follow up in 6 wks for repeat clinical assessment.  She will continue PT and a home program.  All questions were answered and the patient verbalized understanding. The patient is in agreement with this treatment plan.

## 2024-06-21 ENCOUNTER — APPOINTMENT (OUTPATIENT)
Dept: ORTHOPEDIC SURGERY | Facility: CLINIC | Age: 69
End: 2024-06-21
Payer: MEDICARE

## 2024-06-21 VITALS — WEIGHT: 180 LBS | BODY MASS INDEX: 28.93 KG/M2 | HEIGHT: 66 IN

## 2024-06-21 DIAGNOSIS — M18.12 UNILATERAL PRIMARY OSTEOARTHRITIS OF FIRST CARPOMETACARPAL JOINT, LEFT HAND: ICD-10-CM

## 2024-06-21 DIAGNOSIS — M18.11 UNILATERAL PRIMARY OSTEOARTHRITIS OF FIRST CARPOMETACARPAL JOINT, RIGHT HAND: ICD-10-CM

## 2024-06-21 PROCEDURE — 20605 DRAIN/INJ JOINT/BURSA W/O US: CPT | Mod: LT

## 2024-06-21 PROCEDURE — 99213 OFFICE O/P EST LOW 20 MIN: CPT | Mod: 25

## 2024-06-21 NOTE — HISTORY OF PRESENT ILLNESS
[Dull/Aching] : dull/aching [Localized] : localized [Shooting] : shooting [Throbbing] : throbbing [de-identified] : 69 year old female followed for b/l CMC arthritis. she was given b/l CMC CSI in April 2023. Today, LT CMC is bothersome.    [] : no [FreeTextEntry1] : LFT thumb [FreeTextEntry5] : LFT thumb pain that developed a while ago and worsen this morning.

## 2024-06-21 NOTE — PROCEDURE
[FreeTextEntry3] : Medium joint injection was performed of the left CMC joint. The indication for this procedure was pain and inflammation. The site was prepped with alcohol, ethyl chloride sprayed topically, and sterile technique used. An injection of Lidocaine 0.5cc of 1%, Triamcinolone (Kenalog) 0.5cc of 10 mg was used. Patient tolerated procedure well.   The risks benefits, and alternatives have been discussed, and verbal consent was obtained.   Thumb CMC injection was performed because of pain inflammation and stiffness. Patient has tried OTC's including aspirin, Ibuprofen, Aleve etc or prescription NSAIDS, and/or exercises at home and/ or physical therapy without satisfactory response. After verbal consent using sterile preparation and technique. The risks, benefits, and alternatives to cortisone injection were explained in full to the patient. Risks outlined include but are not limited to infection, sepsis, bleeding, scarring, skin, discoloration, temporary increase in pain, syncopal episode, failure to resolve symptoms, allergic reaction, symptom recurrence, and elevation of blood sugar in diabetics. Patient understood the risks. All questions were answered. After discussion of options, patient requested an injection. Oral informed consent was obtained, and sterile prep was done of the injection site. Sterile technique was utilized for the procedure including the preparation of the solutions used for the injection. Patient tolerated the procedure well. Advised to ice the injection site this evening. Sterile technique used.

## 2024-06-21 NOTE — DISCUSSION/SUMMARY
[de-identified] : Discussed the nature of the diagnosis and risk and benefits of different modalities of treatment. Discussed conservative management as symptoms demand vs CSI. Pt would like another CSI for the LT CMC.  Done today and tolerated well. All questions answered.

## 2024-06-26 ENCOUNTER — NON-APPOINTMENT (OUTPATIENT)
Age: 69
End: 2024-06-26

## 2024-07-22 ENCOUNTER — NON-APPOINTMENT (OUTPATIENT)
Age: 69
End: 2024-07-22

## 2024-07-31 ENCOUNTER — APPOINTMENT (OUTPATIENT)
Dept: ORTHOPEDIC SURGERY | Facility: CLINIC | Age: 69
End: 2024-07-31
Payer: MEDICARE

## 2024-07-31 DIAGNOSIS — M75.01 ADHESIVE CAPSULITIS OF RIGHT SHOULDER: ICD-10-CM

## 2024-07-31 DIAGNOSIS — M75.21 BICIPITAL TENDINITIS, RIGHT SHOULDER: ICD-10-CM

## 2024-07-31 PROCEDURE — 99024 POSTOP FOLLOW-UP VISIT: CPT

## 2024-07-31 NOTE — HISTORY OF PRESENT ILLNESS
[___ Weeks Post Op] : [unfilled] weeks post op [Xray (Date:___)] : [unfilled] Xray -  [Doing Well] : is doing well [No Sign of Infection] : is showing no signs of infection [Adequate Pain Control] : has adequate pain control [Sutures Removed] : sutures were removed [de-identified] : SPO Right shoulder arthroscopy, biceps tenodesis, subacromial decompression, acromioplasty DOS: 5/8/24 [de-identified] : Patient is here today for 3rd post operative visit. SPO Right shoulder arthroscopy, biceps tenodesis, subacromial decompression, acromioplasty DOS: 5/8/24 She denies fever or chills, redness around or near the incision site(s), numbness/tingling. She denies nausea/ vomiting and admits to their appetite since their surgery being back to normal. Normal bowel habits at this time. Patient presents today doing well.  She has minimal complaints.  She is performing PT.  She is leaving for 6 weeks of vacation. [de-identified] : Incisions are clean, dry and intact. No surrounding erythema. No drainage. Wound appears to be healing well. She has passive range of motion of 165 forward flexion, ER to 65  and IR to upper lumbar level. Motor and sensation are intact distally. She has full range of motion of all fingers with capillary refill of <2 seconds throughout. She has good nerve function and median nerve, ulnar, radial, PIN, AIN. She has no sensory deficits over the C5-T1 nerve roots. Radial pulses 2+. Able to make an A-OK sign and thumbs up sign: able to flex/extend thumb, able to cross middle over index finger. Full ROM elbow, wrist, hand [de-identified] : I had a discussion with the patient regarding the operative and postoperative course. The patient is doing well. Patient will follow up in 12 wks for repeat clinical assessment.  She will continue PT and a home program.  All questions were answered and the patient verbalized understanding. The patient is in agreement with this treatment plan.

## 2024-08-06 PROBLEM — M62.830 SPASM OF LEFT TRAPEZIUS MUSCLE: Status: ACTIVE | Noted: 2024-08-06

## 2024-08-15 ENCOUNTER — NON-APPOINTMENT (OUTPATIENT)
Age: 69
End: 2024-08-15

## 2024-08-21 ENCOUNTER — APPOINTMENT (OUTPATIENT)
Dept: PHYSICAL MEDICINE AND REHAB | Facility: CLINIC | Age: 69
End: 2024-08-21
Payer: MEDICARE

## 2024-08-21 VITALS
SYSTOLIC BLOOD PRESSURE: 122 MMHG | HEIGHT: 66 IN | BODY MASS INDEX: 31.34 KG/M2 | WEIGHT: 195 LBS | HEART RATE: 102 BPM | RESPIRATION RATE: 14 BRPM | DIASTOLIC BLOOD PRESSURE: 84 MMHG

## 2024-08-21 DIAGNOSIS — M54.2 CERVICALGIA: ICD-10-CM

## 2024-08-21 DIAGNOSIS — M54.81 OCCIPITAL NEURALGIA: ICD-10-CM

## 2024-08-21 DIAGNOSIS — M79.18 MYALGIA, OTHER SITE: ICD-10-CM

## 2024-08-21 PROCEDURE — 99204 OFFICE O/P NEW MOD 45 MIN: CPT

## 2024-08-21 RX ORDER — METHIMAZOLE 5 MG/1
TABLET ORAL
Refills: 0 | Status: ACTIVE | COMMUNITY

## 2024-08-21 RX ORDER — GABAPENTIN 300 MG/1
300 CAPSULE ORAL
Qty: 90 | Refills: 1 | Status: ACTIVE | COMMUNITY
Start: 2024-08-21 | End: 1900-01-01

## 2024-08-21 RX ORDER — LIDOCAINE 5 G/100G
5 OINTMENT TOPICAL
Qty: 2 | Refills: 1 | Status: ACTIVE | COMMUNITY
Start: 2024-08-21 | End: 1900-01-01

## 2024-08-21 RX ORDER — METHOCARBAMOL 500 MG/1
500 TABLET, FILM COATED ORAL TWICE DAILY
Qty: 60 | Refills: 1 | Status: ACTIVE | COMMUNITY
Start: 2024-08-21 | End: 1900-01-01

## 2024-08-21 NOTE — REVIEW OF SYSTEMS
[Fever] : no fever [Chills] : no chills [Chest Pain] : no chest pain [Incontinence] : no incontinence [Headache] : headache [Dizziness] : no dizziness [Fainting] : no fainting [Difficulty Walking] : no difficulty walking [FreeTextEntry9] : +neck pain, shoulder pain

## 2024-08-21 NOTE — HISTORY OF PRESENT ILLNESS
[FreeTextEntry1] : Ms. 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    HPI  08/21/2024 Location: left occiput  Onset: June 2024, after R shoulder surgery. She feels like she has a grapefruit on the back of her left head, with associated headaches. Denies vision changes, blurry vision, balance problems, LOC, dizziness, SOB, CP, memory problems.  Provocation/Palliative: constant pain, worse with laying down flat.  Quality: constant, achy, sore  Radiation: pain and tingling down her neck and upper shoulder. not in LUE.  Severity:  8/10   Denies any associated leg weakness. Denies any loss of bowel/bladder control or any groin numbness.   Current pain medications:  ibuprofen 400mg 5x/day  Excedrin    Previous medications trialed:  -   Previous procedures relevant to complaint: Injections:   6/21/24 - L CMC CSI    12/2023 - R GTB CSI    4/2023 - [facility] CSI  Surgery:   5/8/24 - R shoulder shoulder arthroscopy, biceps tenodesis, subacromial decompression, acromioplasty   11/24/21 - L TKA      7/30/19 - R TKA    10/2017 - L shoulder rotator cuff repair    Conservative therapy tried: Physical Therapy: + currently attending.     Diagnostic studies reviewed by me: pending MR brain?

## 2024-08-21 NOTE — ASSESSMENT
[FreeTextEntry1] : Ms. DELISA EAGLE is a 69 year female who presents with left occipital pain and headaches.  Left occipital pain and headaches are reproduced with palpation along the left occiput as well as with cervical extension and rotation.  There are also focal bands of hyper irritable, taut muscles along the left cervical paraspinals and upper traps that reproduce pain referral pattern into its response.  I discussed with the patient that the likely etiology of her lower cervical and upper trapezius pain is due to myofascial pain.  Her left occipital pain appears to be due to left greater occipital neuralgia however she describes the pain in this area like a grapefruit is in the back of her skull.  There are no focal deficits on exam today however due to her history of pituitary adenoma and resection I would like to get an MRI of the brain and cervical spine for further evaluation.  The patient states that she does have some imaging pending at [facility] for September 2 however she is unsure of what type of imaging she is receiving.    Impression: Left occipital pain ?  Left greater occipital neuralgia Neck pain Cervical myofascial pain History of pituitary adenoma s/p resection  Plan:  - Ortho notes reviewed - MR head w/wo IV contrast ordered - MR C spine ordered -Continue physical therapy, with emphasis on adherence to home exercise program -Start gabapentin 300 mg at bedtime for 3 days then increase to 300 mg twice daily for 3 days then increase to 300 mg in the morning and 600 mg at bedtime as tolerated, new Rx sent -Start methocarbamol 500 mg at bedtime.  If not driving or operating machinery can increase to 2 500 mg every 12 hours as tolerated, Rx sent -Start lidocaine ointment 5% to the left occipital skull 3 times daily as needed, Rx sent -Red flag signs and symptoms reviewed that would prompt urgent evaluation in the emergency department - rtc in 1 month    The patient expressed verbal understanding and is in agreement with the plan of care. All of the patient's questions and concerns were addressed during today's visit.

## 2024-08-21 NOTE — PHYSICAL EXAM
[FreeTextEntry1] : Constitutional: In NAD, calm and cooperative Heart: well perfused Lungs: nonlabored breathing Neuro: AAOx3, CN2-12 grossly intact, follows commands, no dysmetria  MSK (Neck): Inspection: no gross swelling identified Palpation: Tenderness to palpation along left occiput and posterior skull.  Tenderness of the left lower cervical paraspinals and upper traps,  focal areas of hyperirritable, palpable, taut bands of muscles that reproduce pain referral pattern and twitch response with palpation ROM: Pain at end cervical extension and rotation  Strength: 5/5 strength in bilateral upper extremities Reflexes: 2+ Biceps/Brachioradialis reflex bilaterally, Hoffmans negative bilaterally Sensation: Intact to light touch in bilateral upper extremities Special tests: Spurlings test negative bilaterally Facet Loading: negative bilaterally

## 2024-09-10 ENCOUNTER — APPOINTMENT (OUTPATIENT)
Dept: PHYSICAL MEDICINE AND REHAB | Facility: CLINIC | Age: 69
End: 2024-09-10
Payer: MEDICARE

## 2024-09-10 VITALS
DIASTOLIC BLOOD PRESSURE: 79 MMHG | HEIGHT: 66 IN | WEIGHT: 197 LBS | SYSTOLIC BLOOD PRESSURE: 114 MMHG | HEART RATE: 97 BPM | RESPIRATION RATE: 15 BRPM | BODY MASS INDEX: 31.66 KG/M2

## 2024-09-10 DIAGNOSIS — M54.81 OCCIPITAL NEURALGIA: ICD-10-CM

## 2024-09-10 DIAGNOSIS — M54.2 CERVICALGIA: ICD-10-CM

## 2024-09-10 DIAGNOSIS — M79.18 MYALGIA, OTHER SITE: ICD-10-CM

## 2024-09-10 DIAGNOSIS — R51.9 HEADACHE, UNSPECIFIED: ICD-10-CM

## 2024-09-10 PROCEDURE — 99214 OFFICE O/P EST MOD 30 MIN: CPT

## 2024-09-10 NOTE — PHYSICAL EXAM
[FreeTextEntry1] : Constitutional: In NAD, calm and cooperative Heart: well perfused Lungs: nonlabored breathing Neuro: AAOx3, CN2-12 grossly intact, follows commands, no dysmetria  MSK (Neck): Inspection: no gross swelling identified Palpation: Improved tenderness to palpation along left occiput and posterior skull.  Tenderness of the left lower cervical paraspinals and upper traps, focal areas of hyperirritable, palpable, taut bands of muscles that reproduce pain referral pattern and twitch response with palpation ROM: Pain at end cervical extension and rotation  Strength: 5/5 strength in bilateral upper extremities Reflexes: 2+ Biceps/Brachioradialis reflex bilaterally, Hoffmans negative bilaterally Sensation: Intact to light touch in bilateral upper extremities Special tests: Spurlings test negative bilaterally Facet Loading: negative bilaterally

## 2024-09-10 NOTE — REVIEW OF SYSTEMS
[Headache] : headache [Fever] : no fever [Chills] : no chills [Chest Pain] : no chest pain [Incontinence] : no incontinence [Dizziness] : no dizziness [Fainting] : no fainting [Difficulty Walking] : no difficulty walking [FreeTextEntry9] : +neck pain, shoulder pain

## 2024-09-10 NOTE — ASSESSMENT
[FreeTextEntry1] : Ms. DELISA EAGLE is a 69 year female who presents with left occipital pain and headaches.  Left occipital pain and headaches are reproduced with palpation along the left occiput as well as with cervical extension and rotation.  There are also focal bands of hyper irritable, taut muscles along the left cervical paraspinals and upper traps that reproduce pain referral pattern into its response.  I discussed with the patient that the likely etiology of her lower cervical and upper trapezius pain is due to myofascial pain.  Her left occipital pain appears to be due to left greater occipital neuralgia however she describes the pain in this area like a grapefruit is in the back of her skull.    Overall pain has improved with initiation of medications, is amenable to increasing doses prior to injections     Impression: Left occipital pain Left occipital neuralgia Neck pain Cervical myofascial pain History of pituitary adenoma s/p resection  Plan:  - Ortho notes reviewed - MR pituitary gland and MR C spine reviewed  -Continue physical therapy, with emphasis on adherence to home exercise program - Increase gabapentin to 600mg qAm and 900mg qhs, new Rx sent - 1 week after increasing gabapentin, may increase methocarbamol to 500mg qAM and 1000mg qhs, new rx sent.  -Start lidocaine ointment 5% to the left occipital skull 3 times daily as needed, Rx previously sent -Red flag signs and symptoms reviewed that would prompt urgent evaluation in the emergency department - rtc in 1-2 month    The patient expressed verbal understanding and is in agreement with the plan of care. All of the patient's questions and concerns were addressed during today's visit.     
no

## 2024-09-10 NOTE — HISTORY OF PRESENT ILLNESS
[FreeTextEntry1] : Ms. 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     Interval hx 09/10/2024  Ms. DELISA EAGLE is a 69 year female presents for follow up for left occipital pain. At last visit, MR head was pending, MR C spine was ordered. Gabapentin and methocarbamol were started. She is currently taking gabapentin 300mg qAM and 600mg qhs, and methocarbamol 500mg qhs, She reports improvement in her occipital, left neck and upper shoulder pain from a constant 8/10 to an intermittent 5-6/10. Has fewer associated headaches. Denies any adverse effects of the medication. Denies vision changes, blurry vision, balance problems, LOC, dizziness, SOB, CP, memory problems. Denies any associated leg weakness. Denies any loss of bowel/bladder control or any groin numbness.   HPI  08/21/2024 Location: left occiput  Onset: June 2024, after R shoulder surgery. She feels like she has a grapefruit on the back of her left head, with associated headaches. Denies vision changes, blurry vision, balance problems, LOC, dizziness, SOB, CP, memory problems.  Provocation/Palliative: constant pain, worse with laying down flat.  Quality: constant, achy, sore  Radiation: pain and tingling down her neck and upper shoulder. not in LUE.  Severity:  8/10   Denies any associated leg weakness. Denies any loss of bowel/bladder control or any groin numbness.   Current pain medications:  gabapentin 300mg/600mg - helpful methocarbamol 500mg qhs  Excedrin    Previous medications trialed:  -   Previous procedures relevant to complaint: Injections:   6/21/24 - L CMC CSI    12/2023 - R GTB CSI    4/2023 - BL [facility] CSI  Surgery:   5/8/24 - R shoulder shoulder arthroscopy, biceps tenodesis, subacromial decompression, acromioplasty   11/24/21 - L TKA      7/30/19 - R TKA    10/2017 - L shoulder rotator cuff repair    Conservative therapy tried: Physical Therapy: + currently attending.     Diagnostic studies reviewed by me: MR C spine - no fractures, multilevel disc degenerative changes, multilevel facet arthropathy.  MR pituitary gland - stable post-surgical changes at the [facility].

## 2024-09-27 ENCOUNTER — APPOINTMENT (OUTPATIENT)
Dept: ORTHOPEDIC SURGERY | Facility: CLINIC | Age: 69
End: 2024-09-27
Payer: MEDICARE

## 2024-09-27 DIAGNOSIS — M18.12 UNILATERAL PRIMARY OSTEOARTHRITIS OF FIRST CARPOMETACARPAL JOINT, LEFT HAND: ICD-10-CM

## 2024-09-27 DIAGNOSIS — M18.11 UNILATERAL PRIMARY OSTEOARTHRITIS OF FIRST CARPOMETACARPAL JOINT, RIGHT HAND: ICD-10-CM

## 2024-09-27 PROCEDURE — 20605 DRAIN/INJ JOINT/BURSA W/O US: CPT | Mod: LT

## 2024-09-27 PROCEDURE — 99213 OFFICE O/P EST LOW 20 MIN: CPT | Mod: 25

## 2024-09-27 NOTE — DISCUSSION/SUMMARY
[de-identified] : Discussed the nature of the diagnosis and risk and benefits of different modalities of treatment. Discussed conservative management as symptoms demand vs CSI. Pt would like another CSI for the LT CMC.  Done today and tolerated well. All questions answered.

## 2024-09-27 NOTE — HISTORY OF PRESENT ILLNESS
[de-identified] : 69 year old female followed for b/l CMC arthritis. she was given b/l [facility]MC CSI in April 2023. Today, LT CMC is bothersome.

## 2024-10-05 ENCOUNTER — NON-APPOINTMENT (OUTPATIENT)
Age: 69
End: 2024-10-05

## 2024-11-08 ENCOUNTER — RX RENEWAL (OUTPATIENT)
Age: 69
End: 2024-11-08

## 2024-11-12 ENCOUNTER — APPOINTMENT (OUTPATIENT)
Dept: ORTHOPEDIC SURGERY | Facility: CLINIC | Age: 69
End: 2024-11-12

## 2024-11-13 ENCOUNTER — NON-APPOINTMENT (OUTPATIENT)
Age: 69
End: 2024-11-13

## 2024-11-13 ENCOUNTER — APPOINTMENT (OUTPATIENT)
Dept: RHEUMATOLOGY | Facility: CLINIC | Age: 69
End: 2024-11-13
Payer: MEDICARE

## 2024-11-13 VITALS
SYSTOLIC BLOOD PRESSURE: 130 MMHG | HEART RATE: 93 BPM | OXYGEN SATURATION: 97 % | BODY MASS INDEX: 31.34 KG/M2 | DIASTOLIC BLOOD PRESSURE: 83 MMHG | HEIGHT: 66 IN | WEIGHT: 195 LBS

## 2024-11-13 DIAGNOSIS — Z78.0 ASYMPTOMATIC MENOPAUSAL STATE: ICD-10-CM

## 2024-11-13 DIAGNOSIS — R70.0 ELEVATED ERYTHROCYTE SEDIMENTATION RATE: ICD-10-CM

## 2024-11-13 DIAGNOSIS — R79.82 ELEVATED C-REACTIVE PROTEIN (CRP): ICD-10-CM

## 2024-11-13 DIAGNOSIS — R76.8 OTHER SPECIFIED ABNORMAL IMMUNOLOGICAL FINDINGS IN SERUM: ICD-10-CM

## 2024-11-13 DIAGNOSIS — I73.00 RAYNAUD'S SYNDROME W/OUT GANGRENE: ICD-10-CM

## 2024-11-13 DIAGNOSIS — R68.2 DRY MOUTH, UNSPECIFIED: ICD-10-CM

## 2024-11-13 DIAGNOSIS — I10 ESSENTIAL (PRIMARY) HYPERTENSION: ICD-10-CM

## 2024-11-13 PROCEDURE — G2211 COMPLEX E/M VISIT ADD ON: CPT

## 2024-11-13 PROCEDURE — 99204 OFFICE O/P NEW MOD 45 MIN: CPT

## 2024-11-13 PROCEDURE — 99214 OFFICE O/P EST MOD 30 MIN: CPT

## 2024-11-14 ENCOUNTER — NON-APPOINTMENT (OUTPATIENT)
Age: 69
End: 2024-11-14

## 2024-11-14 LAB
ALBUMIN SERPL ELPH-MCNC: 4.1 G/DL
ALP BLD-CCNC: 93 U/L
ALT SERPL-CCNC: 11 U/L
ANION GAP SERPL CALC-SCNC: 10 MMOL/L
AST SERPL-CCNC: 12 U/L
BASOPHILS # BLD AUTO: 0.09 K/UL
BASOPHILS NFR BLD AUTO: 0.8 %
BILIRUB SERPL-MCNC: 0.3 MG/DL
BUN SERPL-MCNC: 17 MG/DL
C3 SERPL-MCNC: 166 MG/DL
C4 SERPL-MCNC: 26 MG/DL
CALCIUM SERPL-MCNC: 10.2 MG/DL
CHLORIDE SERPL-SCNC: 104 MMOL/L
CO2 SERPL-SCNC: 28 MMOL/L
CREAT SERPL-MCNC: 0.87 MG/DL
CRP SERPL-MCNC: 7 MG/L
EGFR: 73 ML/MIN/1.73M2
EOSINOPHIL # BLD AUTO: 0.38 K/UL
EOSINOPHIL NFR BLD AUTO: 3.2 %
ERYTHROCYTE [SEDIMENTATION RATE] IN BLOOD BY WESTERGREN METHOD: 40 MM/HR
GLUCOSE SERPL-MCNC: 88 MG/DL
HCT VFR BLD CALC: 43.3 %
HGB BLD-MCNC: 13 G/DL
IMM GRANULOCYTES NFR BLD AUTO: 0.3 %
LYMPHOCYTES # BLD AUTO: 3.25 K/UL
LYMPHOCYTES NFR BLD AUTO: 27.5 %
MAN DIFF?: NORMAL
MCHC RBC-ENTMCNC: 27.9 PG
MCHC RBC-ENTMCNC: 30 G/DL
MCV RBC AUTO: 92.9 FL
MONOCYTES # BLD AUTO: 0.71 K/UL
MONOCYTES NFR BLD AUTO: 6 %
NEUTROPHILS # BLD AUTO: 7.33 K/UL
NEUTROPHILS NFR BLD AUTO: 62.2 %
PLATELET # BLD AUTO: 311 K/UL
POTASSIUM SERPL-SCNC: 4 MMOL/L
PROT SERPL-MCNC: 6.8 G/DL
RBC # BLD: 4.66 M/UL
RBC # FLD: 14.3 %
SODIUM SERPL-SCNC: 141 MMOL/L
WBC # FLD AUTO: 11.8 K/UL

## 2024-11-15 ENCOUNTER — APPOINTMENT (OUTPATIENT)
Dept: PHYSICAL MEDICINE AND REHAB | Facility: CLINIC | Age: 69
End: 2024-11-15

## 2024-11-21 LAB
ANA PAT FLD IF-IMP: NORMAL
ANA SER IF-ACNC: ABNORMAL
DSDNA AB SER-ACNC: <1 IU/ML
ENA SS-A AB SER IA-ACNC: <0.2 AL
ENA SS-B AB SER IA-ACNC: <0.2 AL

## 2024-12-30 ENCOUNTER — APPOINTMENT (OUTPATIENT)
Dept: ORTHOPEDIC SURGERY | Facility: CLINIC | Age: 69
End: 2024-12-30
Payer: MEDICARE

## 2024-12-30 VITALS — WEIGHT: 195 LBS | BODY MASS INDEX: 31.34 KG/M2 | HEIGHT: 66 IN

## 2024-12-30 DIAGNOSIS — M18.12 UNILATERAL PRIMARY OSTEOARTHRITIS OF FIRST CARPOMETACARPAL JOINT, LEFT HAND: ICD-10-CM

## 2024-12-30 DIAGNOSIS — M18.11 UNILATERAL PRIMARY OSTEOARTHRITIS OF FIRST CARPOMETACARPAL JOINT, RIGHT HAND: ICD-10-CM

## 2024-12-30 PROCEDURE — 20605 DRAIN/INJ JOINT/BURSA W/O US: CPT | Mod: LT

## 2025-01-03 ENCOUNTER — RX RENEWAL (OUTPATIENT)
Age: 70
End: 2025-01-03

## 2025-01-14 ENCOUNTER — APPOINTMENT (OUTPATIENT)
Dept: ORTHOPEDIC SURGERY | Facility: CLINIC | Age: 70
End: 2025-01-14
Payer: MEDICARE

## 2025-01-14 DIAGNOSIS — S46.011D STRAIN OF MUSCLE(S) AND TENDON(S) OF THE ROTATOR CUFF OF RIGHT SHOULDER, SUBSEQUENT ENCOUNTER: ICD-10-CM

## 2025-01-14 DIAGNOSIS — M75.01 ADHESIVE CAPSULITIS OF RIGHT SHOULDER: ICD-10-CM

## 2025-01-14 PROCEDURE — 73030 X-RAY EXAM OF SHOULDER: CPT | Mod: RT

## 2025-01-14 PROCEDURE — 99214 OFFICE O/P EST MOD 30 MIN: CPT

## 2025-01-14 RX ORDER — MELOXICAM 15 MG/1
15 TABLET ORAL
Qty: 21 | Refills: 0 | Status: ACTIVE | COMMUNITY
Start: 2025-01-14 | End: 1900-01-01

## 2025-01-23 ENCOUNTER — NON-APPOINTMENT (OUTPATIENT)
Age: 70
End: 2025-01-23

## 2025-01-27 ENCOUNTER — RX RENEWAL (OUTPATIENT)
Age: 70
End: 2025-01-27

## 2025-02-04 ENCOUNTER — RX RENEWAL (OUTPATIENT)
Age: 70
End: 2025-02-04

## 2025-02-12 ENCOUNTER — APPOINTMENT (OUTPATIENT)
Dept: RHEUMATOLOGY | Facility: CLINIC | Age: 70
End: 2025-02-12

## 2025-02-13 ENCOUNTER — APPOINTMENT (OUTPATIENT)
Dept: ORTHOPEDIC SURGERY | Facility: CLINIC | Age: 70
End: 2025-02-13

## 2025-02-17 ENCOUNTER — RX RENEWAL (OUTPATIENT)
Age: 70
End: 2025-02-17

## 2025-04-10 ENCOUNTER — APPOINTMENT (OUTPATIENT)
Dept: ORTHOPEDIC SURGERY | Facility: CLINIC | Age: 70
End: 2025-04-10
Payer: MEDICARE

## 2025-04-10 DIAGNOSIS — M18.12 UNILATERAL PRIMARY OSTEOARTHRITIS OF FIRST CARPOMETACARPAL JOINT, LEFT HAND: ICD-10-CM

## 2025-04-10 PROCEDURE — 20605 DRAIN/INJ JOINT/BURSA W/O US: CPT | Mod: LT

## 2025-05-07 ENCOUNTER — APPOINTMENT (OUTPATIENT)
Age: 70
End: 2025-05-07
Payer: MEDICARE

## 2025-05-07 VITALS
HEIGHT: 66 IN | DIASTOLIC BLOOD PRESSURE: 77 MMHG | SYSTOLIC BLOOD PRESSURE: 112 MMHG | WEIGHT: 200 LBS | BODY MASS INDEX: 32.14 KG/M2

## 2025-05-07 DIAGNOSIS — M70.61 TROCHANTERIC BURSITIS, RIGHT HIP: ICD-10-CM

## 2025-05-07 PROCEDURE — 99214 OFFICE O/P EST MOD 30 MIN: CPT | Mod: 25

## 2025-05-07 PROCEDURE — 20610 DRAIN/INJ JOINT/BURSA W/O US: CPT | Mod: RT

## 2025-05-14 ENCOUNTER — RESULT REVIEW (OUTPATIENT)
Age: 70
End: 2025-05-14

## 2025-05-14 ENCOUNTER — APPOINTMENT (OUTPATIENT)
Age: 70
End: 2025-05-14
Payer: MEDICARE

## 2025-05-14 VITALS
DIASTOLIC BLOOD PRESSURE: 87 MMHG | SYSTOLIC BLOOD PRESSURE: 128 MMHG | WEIGHT: 200 LBS | BODY MASS INDEX: 32.14 KG/M2 | HEIGHT: 66 IN

## 2025-05-14 DIAGNOSIS — M16.11 UNILATERAL PRIMARY OSTEOARTHRITIS, RIGHT HIP: ICD-10-CM

## 2025-05-14 PROCEDURE — 73502 X-RAY EXAM HIP UNI 2-3 VIEWS: CPT | Mod: RT

## 2025-05-14 PROCEDURE — 99214 OFFICE O/P EST MOD 30 MIN: CPT

## 2025-05-15 ENCOUNTER — APPOINTMENT (OUTPATIENT)
Dept: MRI IMAGING | Facility: CLINIC | Age: 70
End: 2025-05-15
Payer: MEDICARE

## 2025-05-15 ENCOUNTER — OUTPATIENT (OUTPATIENT)
Dept: OUTPATIENT SERVICES | Facility: HOSPITAL | Age: 70
LOS: 1 days | End: 2025-05-15

## 2025-05-15 ENCOUNTER — APPOINTMENT (OUTPATIENT)
Dept: INTERVENTIONAL RADIOLOGY/VASCULAR | Facility: CLINIC | Age: 70
End: 2025-05-15
Payer: MEDICARE

## 2025-05-15 DIAGNOSIS — Z98.890 OTHER SPECIFIED POSTPROCEDURAL STATES: Chronic | ICD-10-CM

## 2025-05-15 DIAGNOSIS — D35.2 BENIGN NEOPLASM OF PITUITARY GLAND: Chronic | ICD-10-CM

## 2025-05-15 DIAGNOSIS — Z96.651 PRESENCE OF RIGHT ARTIFICIAL KNEE JOINT: Chronic | ICD-10-CM

## 2025-05-15 DIAGNOSIS — Z90.710 ACQUIRED ABSENCE OF BOTH CERVIX AND UTERUS: Chronic | ICD-10-CM

## 2025-05-15 DIAGNOSIS — Z96.652 PRESENCE OF LEFT ARTIFICIAL KNEE JOINT: Chronic | ICD-10-CM

## 2025-05-15 DIAGNOSIS — Z90.49 ACQUIRED ABSENCE OF OTHER SPECIFIED PARTS OF DIGESTIVE TRACT: Chronic | ICD-10-CM

## 2025-05-15 DIAGNOSIS — M17.11 UNILATERAL PRIMARY OSTEOARTHRITIS, RIGHT KNEE: ICD-10-CM

## 2025-05-15 DIAGNOSIS — L90.5 SCAR CONDITIONS AND FIBROSIS OF SKIN: Chronic | ICD-10-CM

## 2025-05-15 PROCEDURE — 73721 MRI JNT OF LWR EXTRE W/O DYE: CPT | Mod: 26,RT

## 2025-05-15 PROCEDURE — 27093 INJECTION FOR HIP X-RAY: CPT | Mod: RT

## 2025-05-15 PROCEDURE — 73525 CONTRAST X-RAY OF HIP: CPT | Mod: 26,RT

## 2025-05-30 ENCOUNTER — TRANSCRIPTION ENCOUNTER (OUTPATIENT)
Age: 70
End: 2025-05-30

## 2025-06-10 ENCOUNTER — TRANSCRIPTION ENCOUNTER (OUTPATIENT)
Age: 70
End: 2025-06-10

## 2025-06-11 ENCOUNTER — TRANSCRIPTION ENCOUNTER (OUTPATIENT)
Age: 70
End: 2025-06-11

## 2025-09-10 ENCOUNTER — APPOINTMENT (OUTPATIENT)
Dept: ORTHOPEDIC SURGERY | Facility: CLINIC | Age: 70
End: 2025-09-10
Payer: MEDICARE

## 2025-09-10 VITALS
HEIGHT: 66 IN | SYSTOLIC BLOOD PRESSURE: 124 MMHG | BODY MASS INDEX: 32.14 KG/M2 | WEIGHT: 200 LBS | DIASTOLIC BLOOD PRESSURE: 87 MMHG

## 2025-09-10 DIAGNOSIS — M16.11 UNILATERAL PRIMARY OSTEOARTHRITIS, RIGHT HIP: ICD-10-CM

## 2025-09-10 PROCEDURE — 20610 DRAIN/INJ JOINT/BURSA W/O US: CPT | Mod: RT

## 2025-09-10 PROCEDURE — 99213 OFFICE O/P EST LOW 20 MIN: CPT | Mod: 25
